# Patient Record
Sex: MALE | Race: WHITE | NOT HISPANIC OR LATINO | Employment: FULL TIME | URBAN - NONMETROPOLITAN AREA
[De-identification: names, ages, dates, MRNs, and addresses within clinical notes are randomized per-mention and may not be internally consistent; named-entity substitution may affect disease eponyms.]

---

## 2021-06-29 ENCOUNTER — APPOINTMENT (EMERGENCY)
Dept: RADIOLOGY | Facility: HOSPITAL | Age: 51
End: 2021-06-29
Payer: COMMERCIAL

## 2021-06-29 ENCOUNTER — HOSPITAL ENCOUNTER (EMERGENCY)
Facility: HOSPITAL | Age: 51
End: 2021-06-30
Attending: EMERGENCY MEDICINE | Admitting: EMERGENCY MEDICINE
Payer: COMMERCIAL

## 2021-06-29 DIAGNOSIS — R79.89 ELEVATED D-DIMER: ICD-10-CM

## 2021-06-29 DIAGNOSIS — R77.8 ELEVATED TROPONIN I LEVEL: ICD-10-CM

## 2021-06-29 DIAGNOSIS — R07.9 CHEST PAIN: Primary | ICD-10-CM

## 2021-06-29 DIAGNOSIS — I21.3 ACUTE ST ELEVATION MYOCARDIAL INFARCTION (STEMI) (HCC): ICD-10-CM

## 2021-06-29 LAB
ALBUMIN SERPL BCP-MCNC: 3.9 G/DL (ref 3.5–5)
ALP SERPL-CCNC: 89 U/L (ref 46–116)
ALT SERPL W P-5'-P-CCNC: 35 U/L (ref 12–78)
ANION GAP SERPL CALCULATED.3IONS-SCNC: 9 MMOL/L (ref 4–13)
APTT PPP: 30 SECONDS (ref 23–37)
AST SERPL W P-5'-P-CCNC: 24 U/L (ref 5–45)
BASOPHILS # BLD AUTO: 0.04 THOUSANDS/ΜL (ref 0–0.1)
BASOPHILS NFR BLD AUTO: 1 % (ref 0–1)
BILIRUB SERPL-MCNC: 0.51 MG/DL (ref 0.2–1)
BUN SERPL-MCNC: 16 MG/DL (ref 5–25)
CALCIUM SERPL-MCNC: 8.4 MG/DL (ref 8.3–10.1)
CHLORIDE SERPL-SCNC: 103 MMOL/L (ref 100–108)
CO2 SERPL-SCNC: 28 MMOL/L (ref 21–32)
CREAT SERPL-MCNC: 1.13 MG/DL (ref 0.6–1.3)
D DIMER PPP FEU-MCNC: 0.52 UG/ML FEU
EOSINOPHIL # BLD AUTO: 0.27 THOUSAND/ΜL (ref 0–0.61)
EOSINOPHIL NFR BLD AUTO: 3 % (ref 0–6)
ERYTHROCYTE [DISTWIDTH] IN BLOOD BY AUTOMATED COUNT: 12.9 % (ref 11.6–15.1)
GFR SERPL CREATININE-BSD FRML MDRD: 76 ML/MIN/1.73SQ M
GLUCOSE SERPL-MCNC: 124 MG/DL (ref 65–140)
HCT VFR BLD AUTO: 48.6 % (ref 36.5–49.3)
HGB BLD-MCNC: 16.6 G/DL (ref 12–17)
IMM GRANULOCYTES # BLD AUTO: 0.03 THOUSAND/UL (ref 0–0.2)
IMM GRANULOCYTES NFR BLD AUTO: 0 % (ref 0–2)
INR PPP: 0.96 (ref 0.84–1.19)
LIPASE SERPL-CCNC: 176 U/L (ref 73–393)
LYMPHOCYTES # BLD AUTO: 2.69 THOUSANDS/ΜL (ref 0.6–4.47)
LYMPHOCYTES NFR BLD AUTO: 32 % (ref 14–44)
MCH RBC QN AUTO: 30.9 PG (ref 26.8–34.3)
MCHC RBC AUTO-ENTMCNC: 34.2 G/DL (ref 31.4–37.4)
MCV RBC AUTO: 90 FL (ref 82–98)
MONOCYTES # BLD AUTO: 0.59 THOUSAND/ΜL (ref 0.17–1.22)
MONOCYTES NFR BLD AUTO: 7 % (ref 4–12)
NEUTROPHILS # BLD AUTO: 4.9 THOUSANDS/ΜL (ref 1.85–7.62)
NEUTS SEG NFR BLD AUTO: 57 % (ref 43–75)
NRBC BLD AUTO-RTO: 0 /100 WBCS
PLATELET # BLD AUTO: 185 THOUSANDS/UL (ref 149–390)
PMV BLD AUTO: 9.6 FL (ref 8.9–12.7)
POTASSIUM SERPL-SCNC: 3.7 MMOL/L (ref 3.5–5.3)
PROT SERPL-MCNC: 7.3 G/DL (ref 6.4–8.2)
PROTHROMBIN TIME: 12.6 SECONDS (ref 11.6–14.5)
RBC # BLD AUTO: 5.38 MILLION/UL (ref 3.88–5.62)
SODIUM SERPL-SCNC: 140 MMOL/L (ref 136–145)
TROPONIN I SERPL-MCNC: 0.13 NG/ML
WBC # BLD AUTO: 8.52 THOUSAND/UL (ref 4.31–10.16)

## 2021-06-29 PROCEDURE — 85379 FIBRIN DEGRADATION QUANT: CPT | Performed by: EMERGENCY MEDICINE

## 2021-06-29 PROCEDURE — 80053 COMPREHEN METABOLIC PANEL: CPT | Performed by: EMERGENCY MEDICINE

## 2021-06-29 PROCEDURE — 84484 ASSAY OF TROPONIN QUANT: CPT | Performed by: EMERGENCY MEDICINE

## 2021-06-29 PROCEDURE — 96365 THER/PROPH/DIAG IV INF INIT: CPT

## 2021-06-29 PROCEDURE — 83036 HEMOGLOBIN GLYCOSYLATED A1C: CPT | Performed by: INTERNAL MEDICINE

## 2021-06-29 PROCEDURE — 85025 COMPLETE CBC W/AUTO DIFF WBC: CPT | Performed by: EMERGENCY MEDICINE

## 2021-06-29 PROCEDURE — 99285 EMERGENCY DEPT VISIT HI MDM: CPT

## 2021-06-29 PROCEDURE — 99291 CRITICAL CARE FIRST HOUR: CPT | Performed by: EMERGENCY MEDICINE

## 2021-06-29 PROCEDURE — 85730 THROMBOPLASTIN TIME PARTIAL: CPT | Performed by: EMERGENCY MEDICINE

## 2021-06-29 PROCEDURE — 36415 COLL VENOUS BLD VENIPUNCTURE: CPT | Performed by: EMERGENCY MEDICINE

## 2021-06-29 PROCEDURE — 71045 X-RAY EXAM CHEST 1 VIEW: CPT

## 2021-06-29 PROCEDURE — 96375 TX/PRO/DX INJ NEW DRUG ADDON: CPT

## 2021-06-29 PROCEDURE — 83690 ASSAY OF LIPASE: CPT | Performed by: EMERGENCY MEDICINE

## 2021-06-29 PROCEDURE — 85610 PROTHROMBIN TIME: CPT | Performed by: EMERGENCY MEDICINE

## 2021-06-29 PROCEDURE — 80061 LIPID PANEL: CPT | Performed by: INTERNAL MEDICINE

## 2021-06-29 PROCEDURE — 93005 ELECTROCARDIOGRAM TRACING: CPT

## 2021-06-29 RX ORDER — ONDANSETRON 2 MG/ML
4 INJECTION INTRAMUSCULAR; INTRAVENOUS ONCE
Status: COMPLETED | OUTPATIENT
Start: 2021-06-30 | End: 2021-06-29

## 2021-06-29 RX ORDER — NITROGLYCERIN 20 MG/100ML
10 INJECTION INTRAVENOUS
Status: DISCONTINUED | OUTPATIENT
Start: 2021-06-29 | End: 2021-06-30 | Stop reason: HOSPADM

## 2021-06-29 RX ORDER — CLOPIDOGREL BISULFATE 75 MG/1
600 TABLET ORAL ONCE
Status: DISCONTINUED | OUTPATIENT
Start: 2021-06-29 | End: 2021-06-29

## 2021-06-29 RX ORDER — HEPARIN SODIUM 1000 [USP'U]/ML
4000 INJECTION, SOLUTION INTRAVENOUS; SUBCUTANEOUS ONCE
Status: COMPLETED | OUTPATIENT
Start: 2021-06-29 | End: 2021-06-29

## 2021-06-29 RX ORDER — ASPIRIN 81 MG/1
324 TABLET, CHEWABLE ORAL ONCE
Status: COMPLETED | OUTPATIENT
Start: 2021-06-29 | End: 2021-06-29

## 2021-06-29 RX ORDER — DEXAMETHASONE SODIUM PHOSPHATE 10 MG/ML
10 INJECTION, SOLUTION INTRAMUSCULAR; INTRAVENOUS ONCE
Status: COMPLETED | OUTPATIENT
Start: 2021-06-29 | End: 2021-06-29

## 2021-06-29 RX ORDER — NITROGLYCERIN 0.4 MG/1
0.4 TABLET SUBLINGUAL ONCE
Status: COMPLETED | OUTPATIENT
Start: 2021-06-29 | End: 2021-06-29

## 2021-06-29 RX ORDER — SODIUM CHLORIDE 9 MG/ML
125 INJECTION, SOLUTION INTRAVENOUS CONTINUOUS
Status: DISCONTINUED | OUTPATIENT
Start: 2021-06-29 | End: 2021-06-30 | Stop reason: HOSPADM

## 2021-06-29 RX ADMIN — ASPIRIN 81 MG CHEWABLE TABLET 324 MG: 81 TABLET CHEWABLE at 23:14

## 2021-06-29 RX ADMIN — TICAGRELOR 180 MG: 90 TABLET ORAL at 23:31

## 2021-06-29 RX ADMIN — NITROGLYCERIN 0.4 MG: 0.4 TABLET SUBLINGUAL at 23:16

## 2021-06-29 RX ADMIN — HEPARIN SODIUM 4000 UNITS: 1000 INJECTION INTRAVENOUS; SUBCUTANEOUS at 23:36

## 2021-06-29 RX ADMIN — SODIUM CHLORIDE 125 ML/HR: 0.9 INJECTION, SOLUTION INTRAVENOUS at 23:24

## 2021-06-29 RX ADMIN — DEXAMETHASONE SODIUM PHOSPHATE 10 MG: 10 INJECTION, SOLUTION INTRAMUSCULAR; INTRAVENOUS at 23:19

## 2021-06-29 RX ADMIN — FAMOTIDINE 20 MG: 10 INJECTION INTRAVENOUS at 23:21

## 2021-06-29 RX ADMIN — ONDANSETRON 4 MG: 2 INJECTION INTRAMUSCULAR; INTRAVENOUS at 23:55

## 2021-06-29 RX ADMIN — NITROGLYCERIN 10 MCG/MIN: 20 INJECTION INTRAVENOUS at 23:39

## 2021-06-30 ENCOUNTER — APPOINTMENT (INPATIENT)
Dept: NON INVASIVE DIAGNOSTICS | Facility: HOSPITAL | Age: 51
DRG: 174 | End: 2021-06-30
Payer: COMMERCIAL

## 2021-06-30 ENCOUNTER — HOSPITAL ENCOUNTER (INPATIENT)
Dept: NON INVASIVE DIAGNOSTICS | Facility: HOSPITAL | Age: 51
LOS: 1 days | Discharge: HOME | DRG: 174 | End: 2021-07-01
Attending: INTERNAL MEDICINE | Admitting: INTERNAL MEDICINE
Payer: COMMERCIAL

## 2021-06-30 VITALS
TEMPERATURE: 97.1 F | RESPIRATION RATE: 18 BRPM | SYSTOLIC BLOOD PRESSURE: 155 MMHG | WEIGHT: 315 LBS | DIASTOLIC BLOOD PRESSURE: 87 MMHG | HEIGHT: 70 IN | BODY MASS INDEX: 45.1 KG/M2 | HEART RATE: 91 BPM | OXYGEN SATURATION: 98 %

## 2021-06-30 DIAGNOSIS — I21.3 ST ELEVATION MYOCARDIAL INFARCTION (STEMI), UNSPECIFIED ARTERY (HCC): ICD-10-CM

## 2021-06-30 DIAGNOSIS — I21.9 TYPE 1 MYOCARDIAL INFARCTION (HCC): ICD-10-CM

## 2021-06-30 DIAGNOSIS — I21.02 ACUTE ST ELEVATION MYOCARDIAL INFARCTION (STEMI) DUE TO OCCLUSION OF MID PORTION OF LEFT ANTERIOR DESCENDING (LAD) CORONARY ARTERY (HCC): Primary | ICD-10-CM

## 2021-06-30 LAB
ANION GAP SERPL CALCULATED.3IONS-SCNC: 7 MMOL/L (ref 4–13)
ATRIAL RATE: 75 BPM
ATRIAL RATE: 78 BPM
ATRIAL RATE: 93 BPM
ATRIAL RATE: 97 BPM
BUN SERPL-MCNC: 15 MG/DL (ref 5–25)
CALCIUM SERPL-MCNC: 8.7 MG/DL (ref 8.3–10.1)
CHLORIDE SERPL-SCNC: 105 MMOL/L (ref 100–108)
CHOLEST SERPL-MCNC: 168 MG/DL (ref 50–200)
CO2 SERPL-SCNC: 22 MMOL/L (ref 21–32)
CREAT SERPL-MCNC: 0.95 MG/DL (ref 0.6–1.3)
ERYTHROCYTE [DISTWIDTH] IN BLOOD BY AUTOMATED COUNT: 12.8 % (ref 11.6–15.1)
EST. AVERAGE GLUCOSE BLD GHB EST-MCNC: 108 MG/DL
GFR SERPL CREATININE-BSD FRML MDRD: 94 ML/MIN/1.73SQ M
GLUCOSE SERPL-MCNC: 158 MG/DL (ref 65–140)
HBA1C MFR BLD: 5.4 %
HCT VFR BLD AUTO: 47.2 % (ref 36.5–49.3)
HDLC SERPL-MCNC: 30 MG/DL
HGB BLD-MCNC: 16.2 G/DL (ref 12–17)
KCT BLD-ACNC: 219 SEC (ref 89–137)
LDLC SERPL CALC-MCNC: 71 MG/DL (ref 0–100)
LDLC SERPL DIRECT ASSAY-MCNC: 124 MG/DL (ref 0–100)
MCH RBC QN AUTO: 30.9 PG (ref 26.8–34.3)
MCHC RBC AUTO-ENTMCNC: 34.3 G/DL (ref 31.4–37.4)
MCV RBC AUTO: 90 FL (ref 82–98)
P AXIS: 62 DEGREES
P AXIS: 62 DEGREES
P AXIS: 65 DEGREES
P AXIS: 67 DEGREES
PLATELET # BLD AUTO: 177 THOUSANDS/UL (ref 149–390)
PMV BLD AUTO: 9.6 FL (ref 8.9–12.7)
POTASSIUM SERPL-SCNC: 4.3 MMOL/L (ref 3.5–5.3)
PR INTERVAL: 150 MS
PR INTERVAL: 158 MS
PR INTERVAL: 158 MS
PR INTERVAL: 167 MS
QRS AXIS: 49 DEGREES
QRS AXIS: 52 DEGREES
QRS AXIS: 60 DEGREES
QRS AXIS: 63 DEGREES
QRSD INTERVAL: 100 MS
QRSD INTERVAL: 88 MS
QRSD INTERVAL: 96 MS
QRSD INTERVAL: 96 MS
QT INTERVAL: 346 MS
QT INTERVAL: 348 MS
QT INTERVAL: 375 MS
QT INTERVAL: 375 MS
QTC INTERVAL: 419 MS
QTC INTERVAL: 428 MS
QTC INTERVAL: 432 MS
QTC INTERVAL: 439 MS
RBC # BLD AUTO: 5.24 MILLION/UL (ref 3.88–5.62)
SODIUM SERPL-SCNC: 134 MMOL/L (ref 136–145)
SPECIMEN SOURCE: ABNORMAL
T WAVE AXIS: 71 DEGREES
T WAVE AXIS: 73 DEGREES
T WAVE AXIS: 74 DEGREES
T WAVE AXIS: 83 DEGREES
TRIGL SERPL-MCNC: 334 MG/DL
TROPONIN I SERPL-MCNC: 1.42 NG/ML
VENTRICULAR RATE: 75 BPM
VENTRICULAR RATE: 78 BPM
VENTRICULAR RATE: 93 BPM
VENTRICULAR RATE: 97 BPM
WBC # BLD AUTO: 7.2 THOUSAND/UL (ref 4.31–10.16)

## 2021-06-30 PROCEDURE — 83721 ASSAY OF BLOOD LIPOPROTEIN: CPT | Performed by: STUDENT IN AN ORGANIZED HEALTH CARE EDUCATION/TRAINING PROGRAM

## 2021-06-30 PROCEDURE — 99153 MOD SED SAME PHYS/QHP EA: CPT | Performed by: INTERNAL MEDICINE

## 2021-06-30 PROCEDURE — C1894 INTRO/SHEATH, NON-LASER: HCPCS | Performed by: INTERNAL MEDICINE

## 2021-06-30 PROCEDURE — 99152 MOD SED SAME PHYS/QHP 5/>YRS: CPT | Performed by: INTERNAL MEDICINE

## 2021-06-30 PROCEDURE — C1769 GUIDE WIRE: HCPCS | Performed by: INTERNAL MEDICINE

## 2021-06-30 PROCEDURE — 93005 ELECTROCARDIOGRAM TRACING: CPT

## 2021-06-30 PROCEDURE — C9606 PERC D-E COR REVASC W AMI S: HCPCS | Performed by: INTERNAL MEDICINE

## 2021-06-30 PROCEDURE — 027034Z DILATION OF CORONARY ARTERY, ONE ARTERY WITH DRUG-ELUTING INTRALUMINAL DEVICE, PERCUTANEOUS APPROACH: ICD-10-PCS | Performed by: INTERNAL MEDICINE

## 2021-06-30 PROCEDURE — 84484 ASSAY OF TROPONIN QUANT: CPT | Performed by: INTERNAL MEDICINE

## 2021-06-30 PROCEDURE — C1874 STENT, COATED/COV W/DEL SYS: HCPCS

## 2021-06-30 PROCEDURE — C1725 CATH, TRANSLUMIN NON-LASER: HCPCS | Performed by: INTERNAL MEDICINE

## 2021-06-30 PROCEDURE — 85347 COAGULATION TIME ACTIVATED: CPT

## 2021-06-30 PROCEDURE — 93010 ELECTROCARDIOGRAM REPORT: CPT | Performed by: INTERNAL MEDICINE

## 2021-06-30 PROCEDURE — B2111ZZ FLUOROSCOPY OF MULTIPLE CORONARY ARTERIES USING LOW OSMOLAR CONTRAST: ICD-10-PCS | Performed by: INTERNAL MEDICINE

## 2021-06-30 PROCEDURE — 92941 PRQ TRLML REVSC TOT OCCL AMI: CPT | Performed by: INTERNAL MEDICINE

## 2021-06-30 PROCEDURE — NC001 PR NO CHARGE: Performed by: INTERNAL MEDICINE

## 2021-06-30 PROCEDURE — C1887 CATHETER, GUIDING: HCPCS | Performed by: INTERNAL MEDICINE

## 2021-06-30 PROCEDURE — 85027 COMPLETE CBC AUTOMATED: CPT | Performed by: INTERNAL MEDICINE

## 2021-06-30 PROCEDURE — 4A023N7 MEASUREMENT OF CARDIAC SAMPLING AND PRESSURE, LEFT HEART, PERCUTANEOUS APPROACH: ICD-10-PCS | Performed by: INTERNAL MEDICINE

## 2021-06-30 PROCEDURE — 93306 TTE W/DOPPLER COMPLETE: CPT | Performed by: INTERNAL MEDICINE

## 2021-06-30 PROCEDURE — 93458 L HRT ARTERY/VENTRICLE ANGIO: CPT | Performed by: INTERNAL MEDICINE

## 2021-06-30 PROCEDURE — 93306 TTE W/DOPPLER COMPLETE: CPT

## 2021-06-30 PROCEDURE — 80048 BASIC METABOLIC PNL TOTAL CA: CPT | Performed by: INTERNAL MEDICINE

## 2021-06-30 RX ORDER — METOPROLOL TARTRATE 5 MG/5ML
INJECTION INTRAVENOUS CODE/TRAUMA/SEDATION MEDICATION
Status: COMPLETED | OUTPATIENT
Start: 2021-06-30 | End: 2021-06-30

## 2021-06-30 RX ORDER — LISINOPRIL 5 MG/1
5 TABLET ORAL DAILY
Status: DISCONTINUED | OUTPATIENT
Start: 2021-06-30 | End: 2021-07-01 | Stop reason: HOSPADM

## 2021-06-30 RX ORDER — ACETAMINOPHEN 325 MG/1
650 TABLET ORAL EVERY 8 HOURS PRN
Status: DISCONTINUED | OUTPATIENT
Start: 2021-06-30 | End: 2021-07-01 | Stop reason: HOSPADM

## 2021-06-30 RX ORDER — CLOPIDOGREL BISULFATE 75 MG/1
75 TABLET ORAL DAILY
Status: DISCONTINUED | OUTPATIENT
Start: 2021-07-01 | End: 2021-07-01 | Stop reason: HOSPADM

## 2021-06-30 RX ORDER — MIDAZOLAM HYDROCHLORIDE 2 MG/2ML
INJECTION, SOLUTION INTRAMUSCULAR; INTRAVENOUS CODE/TRAUMA/SEDATION MEDICATION
Status: COMPLETED | OUTPATIENT
Start: 2021-06-30 | End: 2021-06-30

## 2021-06-30 RX ORDER — SODIUM CHLORIDE 9 MG/ML
INJECTION, SOLUTION INTRAVENOUS
Status: COMPLETED | OUTPATIENT
Start: 2021-06-30 | End: 2021-06-30

## 2021-06-30 RX ORDER — NITROGLYCERIN 20 MG/100ML
INJECTION INTRAVENOUS CODE/TRAUMA/SEDATION MEDICATION
Status: COMPLETED | OUTPATIENT
Start: 2021-06-30 | End: 2021-06-30

## 2021-06-30 RX ORDER — LIDOCAINE HYDROCHLORIDE 10 MG/ML
INJECTION, SOLUTION EPIDURAL; INFILTRATION; INTRACAUDAL; PERINEURAL CODE/TRAUMA/SEDATION MEDICATION
Status: COMPLETED | OUTPATIENT
Start: 2021-06-30 | End: 2021-06-30

## 2021-06-30 RX ORDER — SODIUM CHLORIDE, SODIUM GLUCONATE, SODIUM ACETATE, POTASSIUM CHLORIDE, MAGNESIUM CHLORIDE, SODIUM PHOSPHATE, DIBASIC, AND POTASSIUM PHOSPHATE .53; .5; .37; .037; .03; .012; .00082 G/100ML; G/100ML; G/100ML; G/100ML; G/100ML; G/100ML; G/100ML
125 INJECTION, SOLUTION INTRAVENOUS CONTINUOUS
Status: DISPENSED | OUTPATIENT
Start: 2021-06-30 | End: 2021-06-30

## 2021-06-30 RX ORDER — OXYCODONE HYDROCHLORIDE 5 MG/1
5 TABLET ORAL EVERY 6 HOURS PRN
Status: ACTIVE | OUTPATIENT
Start: 2021-06-30 | End: 2021-06-30

## 2021-06-30 RX ORDER — HEPARIN SODIUM 1000 [USP'U]/ML
INJECTION, SOLUTION INTRAVENOUS; SUBCUTANEOUS CODE/TRAUMA/SEDATION MEDICATION
Status: COMPLETED | OUTPATIENT
Start: 2021-06-30 | End: 2021-06-30

## 2021-06-30 RX ORDER — NICOTINE 21 MG/24HR
21 PATCH, TRANSDERMAL 24 HOURS TRANSDERMAL DAILY
Status: CANCELLED | OUTPATIENT
Start: 2021-07-01

## 2021-06-30 RX ORDER — ATORVASTATIN CALCIUM 40 MG/1
40 TABLET, FILM COATED ORAL
Status: DISCONTINUED | OUTPATIENT
Start: 2021-06-30 | End: 2021-07-01 | Stop reason: HOSPADM

## 2021-06-30 RX ORDER — VERAPAMIL HYDROCHLORIDE 2.5 MG/ML
INJECTION, SOLUTION INTRAVENOUS CODE/TRAUMA/SEDATION MEDICATION
Status: COMPLETED | OUTPATIENT
Start: 2021-06-30 | End: 2021-06-30

## 2021-06-30 RX ORDER — POTASSIUM CHLORIDE 20 MEQ/1
40 TABLET, EXTENDED RELEASE ORAL DAILY
Status: DISCONTINUED | OUTPATIENT
Start: 2021-06-30 | End: 2021-07-01 | Stop reason: HOSPADM

## 2021-06-30 RX ORDER — FENTANYL CITRATE 50 UG/ML
INJECTION, SOLUTION INTRAMUSCULAR; INTRAVENOUS CODE/TRAUMA/SEDATION MEDICATION
Status: COMPLETED | OUTPATIENT
Start: 2021-06-30 | End: 2021-06-30

## 2021-06-30 RX ORDER — ASPIRIN 81 MG/1
81 TABLET ORAL DAILY
Status: DISCONTINUED | OUTPATIENT
Start: 2021-06-30 | End: 2021-07-01 | Stop reason: HOSPADM

## 2021-06-30 RX ORDER — NICOTINE 21 MG/24HR
21 PATCH, TRANSDERMAL 24 HOURS TRANSDERMAL DAILY
Status: DISCONTINUED | OUTPATIENT
Start: 2021-06-30 | End: 2021-07-01 | Stop reason: HOSPADM

## 2021-06-30 RX ORDER — CLOPIDOGREL BISULFATE 75 MG/1
300 TABLET ORAL ONCE
Status: COMPLETED | OUTPATIENT
Start: 2021-06-30 | End: 2021-06-30

## 2021-06-30 RX ORDER — POLYETHYLENE GLYCOL 3350 17 G/17G
17 POWDER, FOR SOLUTION ORAL DAILY PRN
Status: DISCONTINUED | OUTPATIENT
Start: 2021-06-30 | End: 2021-07-01 | Stop reason: HOSPADM

## 2021-06-30 RX ORDER — NICOTINE 21 MG/24HR
14 PATCH, TRANSDERMAL 24 HOURS TRANSDERMAL DAILY
Status: DISCONTINUED | OUTPATIENT
Start: 2021-06-30 | End: 2021-06-30

## 2021-06-30 RX ADMIN — METOPROLOL TARTRATE 25 MG: 50 TABLET, FILM COATED ORAL at 02:29

## 2021-06-30 RX ADMIN — Medication 14 MG: at 03:14

## 2021-06-30 RX ADMIN — LIDOCAINE HYDROCHLORIDE 1 ML: 10 INJECTION, SOLUTION EPIDURAL; INFILTRATION; INTRACAUDAL; PERINEURAL at 00:43

## 2021-06-30 RX ADMIN — FENTANYL CITRATE 50 MCG: 50 INJECTION INTRAMUSCULAR; INTRAVENOUS at 00:37

## 2021-06-30 RX ADMIN — MIDAZOLAM 2 MG: 1 INJECTION INTRAMUSCULAR; INTRAVENOUS at 01:11

## 2021-06-30 RX ADMIN — LISINOPRIL 5 MG: 5 TABLET ORAL at 12:13

## 2021-06-30 RX ADMIN — MIDAZOLAM 2 MG: 1 INJECTION INTRAMUSCULAR; INTRAVENOUS at 00:37

## 2021-06-30 RX ADMIN — METOPROLOL TARTRATE 25 MG: 50 TABLET, FILM COATED ORAL at 09:23

## 2021-06-30 RX ADMIN — CLOPIDOGREL BISULFATE 300 MG: 75 TABLET ORAL at 18:18

## 2021-06-30 RX ADMIN — VERAPAMIL HYDROCHLORIDE 2.5 MG: 2.5 INJECTION, SOLUTION INTRAVENOUS at 00:45

## 2021-06-30 RX ADMIN — POTASSIUM CHLORIDE 40 MEQ: 1500 TABLET, EXTENDED RELEASE ORAL at 09:24

## 2021-06-30 RX ADMIN — ENOXAPARIN SODIUM 40 MG: 40 INJECTION SUBCUTANEOUS at 09:30

## 2021-06-30 RX ADMIN — TICAGRELOR 90 MG: 90 TABLET ORAL at 09:24

## 2021-06-30 RX ADMIN — FENTANYL CITRATE 25 MCG: 50 INJECTION INTRAMUSCULAR; INTRAVENOUS at 01:11

## 2021-06-30 RX ADMIN — ATORVASTATIN CALCIUM 40 MG: 40 TABLET, FILM COATED ORAL at 16:02

## 2021-06-30 RX ADMIN — Medication 200 MCG: at 00:44

## 2021-06-30 RX ADMIN — SODIUM CHLORIDE 100 ML/HR: 0.9 INJECTION, SOLUTION INTRAVENOUS at 00:36

## 2021-06-30 RX ADMIN — Medication 200 MCG: at 01:13

## 2021-06-30 RX ADMIN — Medication 200 MCG: at 01:20

## 2021-06-30 RX ADMIN — SODIUM CHLORIDE, SODIUM GLUCONATE, SODIUM ACETATE, POTASSIUM CHLORIDE, MAGNESIUM CHLORIDE, SODIUM PHOSPHATE, DIBASIC, AND POTASSIUM PHOSPHATE 125 ML/HR: .53; .5; .37; .037; .03; .012; .00082 INJECTION, SOLUTION INTRAVENOUS at 02:05

## 2021-06-30 RX ADMIN — IOHEXOL 50 ML: 350 INJECTION, SOLUTION INTRAVENOUS at 01:24

## 2021-06-30 RX ADMIN — METOROPROLOL TARTRATE 5 MG: 5 INJECTION, SOLUTION INTRAVENOUS at 01:19

## 2021-06-30 RX ADMIN — NICOTINE 21 MG: 21 PATCH, EXTENDED RELEASE TRANSDERMAL at 16:02

## 2021-06-30 RX ADMIN — Medication 14 MG: at 09:20

## 2021-06-30 RX ADMIN — HEPARIN SODIUM 8000 UNITS: 1000 INJECTION INTRAVENOUS; SUBCUTANEOUS at 01:02

## 2021-06-30 RX ADMIN — HEPARIN SODIUM 4000 UNITS: 1000 INJECTION INTRAVENOUS; SUBCUTANEOUS at 00:46

## 2021-06-30 RX ADMIN — ASPIRIN 81 MG: 81 TABLET, COATED ORAL at 09:24

## 2021-06-30 RX ADMIN — IOHEXOL 130 ML: 350 INJECTION, SOLUTION INTRAVENOUS at 01:10

## 2021-06-30 RX ADMIN — METOPROLOL TARTRATE 25 MG: 50 TABLET, FILM COATED ORAL at 21:45

## 2021-06-30 NOTE — H&P
Cardiology H&P  Ramy Joe 52 y o  male MRN: [id number]  Unit/Bed#:  Encounter: [id number]    HPI  50yo man without known cardiac history who initially presented to [facility] Valery Sauer who was transferred to [facility] for emergent LHC  Initially presented with intermittent chest pain radiating to his throat and jaw  Also associated pain in both arms but worse in left  Active e-cig use  Troponin positive for 0 13  Initial ECG reported via ED documentation for ST-elevations in V3, V4 with reciprocal ST-depressions in leads I, aVL but unable to locate this ECG  In cath lab showing MARIA LUISA inferior leads  ECG en route via EMS appears normal  LHC via radial approach showed lesion at the ostium of LAD now s/p ALVARADO  A&P  1  STEMI s/p ALVARADO to LAD  - K 3 7  Giving 40mEq PO  - Aspirin 81mg, Brilinta 90mg BID, atorvastatin 40mg  Starting metop tartrate 25mg BID  - TTE, A1C, lipid panel  - Isolyte at 125mL/hr for 1L total  Then can stop IVF  - ECG on arrival to floor and in the morning to evaluate evolution of ST-segment changes  - Troponin in the morning  - Case management consulted and rx for Brilinta sent to [address] [place] [address] for hagan check    2  Nicotine use: can use patch if needed  Complete cessation advised     3  VTE ppx: SCDs, Lovenox 40mg daily in the morning    Romayne El, MD  - PGY-6 Cardiology Fellow  - Tiger text enabled    ======================================================    Physical exam  Vitals: /97, HR 91 bpm, SP02 95% ORA, 97 1 F, RR 18  Gen: well appearing  Psych: AOx3  Skin: intact  Cardiac: S1, S2, regular rate, no S3 or S4 appreciated  No murmurs  +2 PT, radial pulses  No peripheral edema No carotid bruits  Resp: CTABL  No crackles  MSK: 5/5 strength throughout muscle groups  Neuro: CN grossly intact   Sensory to light touch, pain, proprioception intact BL LE, UE  LN: no cervical LAD  Rheum: no joint deformities in UE or LE    ======================================================    TREADMILL STRESS  No results found for this or any previous visit      ----------------------------------------------------------------------------------------------  NUCLEAR STRESS TEST: No results found for this or any previous visit  No results found for this or any previous visit       --------------------------------------------------------------------------------  CATH:  No results found for this or any previous visit     --------------------------------------------------------------------------------  ECHO:   No results found for this or any previous visit  No results found for this or any previous visit     --------------------------------------------------------------------------------  HOLTER  No results found for this or any previous visit     --------------------------------------------------------------------------------  CAROTIDS  No results found for this or any previous visit  [unfilled]   ======================================================      Review of Systems  ROS as noted above, otherwise 12 point review of systems was performed and is negative  Historical Information   No past medical history on file  No past surgical history on file  Social History     Substance and Sexual Activity   Alcohol Use Not Currently     Social History     Substance and Sexual Activity   Drug Use Not Currently     Social History     Tobacco Use   Smoking Status Current Every Day Smoker    Packs/day: 2 00    Years: 22 00    Pack years: 44 00   Smokeless Tobacco Never Used     Family History: No family history on file      Meds/Allergies   Hospital Medications:   Current Facility-Administered Medications   Medication Dose Route Frequency    fentanyl citrate (PF) 100 MCG/2ML   Intravenous Code/Trauma/Sedation Med    midazolam (VERSED) injection    Code/Trauma/Sedation Med    sodium chloride 0 9 % infusion    Code/Trauma/Sedation Continuous Med     Home Medications: (Not in a hospital admission)      No Known Allergies    Objective   Vitals: There were no vitals taken for this visit  No intake or output data in the 24 hours ending 06/30/21 0038    Invasive Devices     Peripheral Intravenous Line            Peripheral IV 06/29/21 Left Antecubital <1 day                Physical Exam    Lab Results: I have personally reviewed pertinent lab results      Results from last 7 days   Lab Units 06/29/21  2305   WBC Thousand/uL 8 52   HEMOGLOBIN g/dL 16 6   HEMATOCRIT % 48 6   PLATELETS Thousands/uL 185     Results from last 7 days   Lab Units 06/29/21  2305   POTASSIUM mmol/L 3 7   CHLORIDE mmol/L 103   CO2 mmol/L 28   BUN mg/dL 16   CREATININE mg/dL 1 13   CALCIUM mg/dL 8 4     Results from last 7 days   Lab Units 06/29/21  2305   INR  0 96   PTT seconds 30

## 2021-06-30 NOTE — ED PROVIDER NOTES
History  Chief Complaint   Patient presents with    Chest Pain     Started a few weeks ago across the entire chest, radiates into both arms, denies any cardiac hx     53 yo male presents with intermitant ant chest pain which radiates to throat and jaw  He can feel it in his bilateral arms left greater than right  Denies shortness of breath increasing dyspnea on exertion or pleuritic pain no prior history of DVT or PE  He denies any numbness or tingling  Works outside he was not sure if he was cut contributing factor he cut out drinking monster drinks last week but he still having the symptoms  The patient does smoke and vape but otherwise past medical history is unremarkable past surgical history none allergies no known drug allergies medications patient did has been taking some aspirin intermittently  Patient denies any voice change but he feels as if his glands are swollen in his throat he has otherwise had no rash; no itching no difficulty swallowing no heart burn  None       History reviewed  No pertinent past medical history  History reviewed  No pertinent surgical history  History reviewed  No pertinent family history  I have reviewed and agree with the history as documented  E-Cigarette/Vaping     E-Cigarette/Vaping Substances     Social History     Tobacco Use    Smoking status: Current Every Day Smoker     Packs/day: 2 00     Years: 22 00     Pack years: 44 00    Smokeless tobacco: Never Used   Substance Use Topics    Alcohol use: Not Currently    Drug use: Not Currently       Review of Systems   Constitutional: Positive for activity change  Negative for appetite change, chills, diaphoresis and fatigue  HENT: Positive for trouble swallowing  Negative for congestion, sore throat and voice change  Respiratory: Negative for chest tightness and shortness of breath  Cardiovascular: Positive for chest pain  Negative for leg swelling     Gastrointestinal: Negative for abdominal distention, abdominal pain, nausea and vomiting  Genitourinary: Negative for difficulty urinating  Musculoskeletal: Positive for myalgias (bilateral arm pain left greater than right)  Negative for neck pain and neck stiffness  Neurological: Negative for dizziness, weakness, light-headedness and numbness  Psychiatric/Behavioral: Negative for confusion  All other systems reviewed and are negative  Physical Exam  Physical Exam  Vitals and nursing note reviewed  Constitutional:       General: He is not in acute distress  Appearance: He is not ill-appearing, toxic-appearing or diaphoretic  HENT:      Head: Normocephalic and atraumatic  Nose: Nose normal       Mouth/Throat:      Mouth: Mucous membranes are moist       Comments: No evidence of angioedema; uvula is midline  Eyes:      General:         Right eye: No discharge  Left eye: No discharge  Extraocular Movements: Extraocular movements intact  Conjunctiva/sclera: Conjunctivae normal       Pupils: Pupils are equal, round, and reactive to light  Cardiovascular:      Rate and Rhythm: Normal rate and regular rhythm  Pulses: Normal pulses  Pulmonary:      Effort: Pulmonary effort is normal  No respiratory distress  Breath sounds: No stridor  No wheezing or rales  Abdominal:      General: Bowel sounds are normal  There is no distension  Tenderness: There is no abdominal tenderness  There is no right CVA tenderness or left CVA tenderness  Comments: No midline or CVA tendnerness   Musculoskeletal:         General: Normal range of motion  Cervical back: Normal range of motion and neck supple  Right lower leg: No edema  Left lower leg: No edema  Skin:     General: Skin is warm and dry  Capillary Refill: Capillary refill takes less than 2 seconds  Neurological:      General: No focal deficit present  Mental Status: He is alert  Cranial Nerves: No cranial nerve deficit  Sensory: No sensory deficit  Motor: No weakness        Coordination: Coordination normal    Psychiatric:      Comments: anxious         Vital Signs  ED Triage Vitals   Temperature Pulse Respirations Blood Pressure SpO2   06/29/21 2229 06/29/21 2229 06/29/21 2229 06/29/21 2234 06/29/21 2234   (!) 97 1 °F (36 2 °C) 93 18 144/97 98 %      Temp Source Heart Rate Source Patient Position - Orthostatic VS BP Location FiO2 (%)   06/29/21 2229 06/29/21 2245 06/29/21 2245 06/29/21 2245 --   Temporal Monitor Lying Right arm       Pain Score       06/29/21 2330       Worst Possible Pain           Vitals:    06/29/21 2340 06/29/21 2345 06/29/21 2349 06/29/21 2350   BP: 140/81 155/87 155/87    Pulse: 99 93 90 91   Patient Position - Orthostatic VS: Sitting  Sitting          Visual Acuity      ED Medications  Medications   aspirin chewable tablet 324 mg (324 mg Oral Given 6/29/21 2314)   nitroglycerin (NITROSTAT) SL tablet 0 4 mg (0 4 mg Sublingual Given 6/29/21 2316)   dexamethasone (PF) (DECADRON) injection 10 mg (10 mg Intravenous Given 6/29/21 2319)   famotidine (PEPCID) injection 20 mg (20 mg Intravenous Given 6/29/21 2321)   heparin (porcine) injection 4,000 Units (4,000 Units Intravenous Given 6/29/21 2336)   ticagrelor (BRILINTA) tablet 180 mg (180 mg Oral Given 6/29/21 2331)   ondansetron (ZOFRAN) injection 4 mg (4 mg Intravenous Given 6/29/21 2355)       Diagnostic Studies  Results Reviewed     Procedure Component Value Units Date/Time    Troponin I [[id number]]  (Abnormal) Collected: 06/29/21 2305    Lab Status: Final result Specimen: Blood from Arm, Left Updated: 06/29/21 2330     Troponin I 0 13 ng/mL     Comprehensive metabolic panel [[id number]] Collected: 06/29/21 2305    Lab Status: Final result Specimen: Blood from Arm, Left Updated: 06/29/21 2327     Sodium 140 mmol/L      Potassium 3 7 mmol/L      Chloride 103 mmol/L      CO2 28 mmol/L      ANION GAP 9 mmol/L      BUN 16 mg/dL      Creatinine 1 13 mg/dL Glucose 124 mg/dL      Calcium 8 4 mg/dL      AST 24 U/L      ALT 35 U/L      Alkaline Phosphatase 89 U/L      Total Protein 7 3 g/dL      Albumin 3 9 g/dL      Total Bilirubin 0 51 mg/dL      eGFR 76 ml/min/1 73sq m     Narrative:      [place] guidelines for Chronic Kidney Disease (CKD):     Stage 1 with normal or high GFR (GFR > 90 mL/min/1 73 square meters)    Stage 2 Mild CKD (GFR = 60-89 mL/min/1 73 square meters)    Stage 3A Moderate CKD (GFR = 45-59 mL/min/1 73 square meters)    Stage 3B Moderate CKD (GFR = 30-44 mL/min/1 73 square meters)    Stage 4 Severe CKD (GFR = 15-29 mL/min/1 73 square meters)    Stage 5 End Stage CKD (GFR <15 mL/min/1 73 square meters)  Note: GFR calculation is accurate only with a steady state creatinine    D-Dimer [[id number]]  (Abnormal) Collected: 06/29/21 2305    Lab Status: Final result Specimen: Blood from Arm, Left Updated: 06/29/21 2327     D-Dimer, Quant 0 52 ug/ml FEU     Protime-INR [[id number]]  (Normal) Collected: 06/29/21 2305    Lab Status: Final result Specimen: Blood from Arm, Left Updated: 06/29/21 2326     Protime 12 6 seconds      INR 0 96    APTT [[id number]]  (Normal) Collected: 06/29/21 2305    Lab Status: Final result Specimen: Blood from Arm, Left Updated: 06/29/21 2326     PTT 30 seconds     Lipase [[id number]]  (Normal) Collected: 06/29/21 2305    Lab Status: Final result Specimen: Blood from Arm, Left Updated: 06/29/21 2322     Lipase 176 u/L     CBC and differential [[id number]] Collected: 06/29/21 2305    Lab Status: Final result Specimen: Blood from Arm, Left Updated: 06/29/21 2313     WBC 8 52 Thousand/uL      RBC 5 38 Million/uL      Hemoglobin 16 6 g/dL      Hematocrit 48 6 %      MCV 90 fL      MCH 30 9 pg      MCHC 34 2 g/dL      RDW 12 9 %      MPV 9 6 fL      Platelets 831 Thousands/uL      nRBC 0 /100 WBCs      Neutrophils Relative 57 %      Immat GRANS % 0 %      Lymphocytes Relative 32 %      Monocytes Relative 7 % Eosinophils Relative 3 %      Basophils Relative 1 %      Neutrophils Absolute 4 90 Thousands/µL      Immature Grans Absolute 0 03 Thousand/uL      Lymphocytes Absolute 2 69 Thousands/µL      Monocytes Absolute 0 59 Thousand/µL      Eosinophils Absolute 0 27 Thousand/µL      Basophils Absolute 0 04 Thousands/µL                  XR chest 1 view portable   ED Interpretation by Db Llamas MD (06/29 2303)   Read by me; Radiologist to provide formal interpretation   No acute process                 Procedures  ECG 12 Lead Documentation Only    Date/Time: 6/29/2021 10:55 PM  Performed by: Db Llamas MD  Authorized by: Db Llamas MD     Indications / Diagnosis:  Cp  ECG reviewed by me, the ED Provider: yes    Patient location:  ED  Previous ECG:     Previous ECG:  Unavailable  Rate:     ECG rate:  93    ECG rate assessment: normal    Rhythm:     Rhythm: sinus rhythm    QRS:     QRS axis:  Normal  Comments:      Nonspecific twave change no acute ischemia no prevoius  ECG 12 Lead Documentation Only    Date/Time: 6/29/2021 11:18 PM  Performed by: Db Llamas MD  Authorized by: Db Llamas MD     Indications / Diagnosis:  Recheck cp  ECG reviewed by me, the ED Provider: yes    Patient location:  ED  Previous ECG:     Previous ECG:  Compared to current    Comparison ECG info:  6/29/21 2228    Similarity:  Changes noted  Rate:     ECG rate:  97    ECG rate assessment: normal    Rhythm:     Rhythm: sinus rhythm    QRS:     QRS axis:  Normal  Comments:      MARIA LUISA V3 V4 with reciprocal STD 1, AVL   CriticalCare Time  Performed by: Db Llamas MD  Authorized by: Db Llamas MD     Critical care provider statement:     Critical care time (minutes):  35    Critical care was necessary to treat or prevent imminent or life-threatening deterioration of the following conditions:  Circulatory failure    Critical care was time spent personally by me on the following activities:  Obtaining history from patient or surrogate, discussions with consultants, evaluation of patient's response to treatment, examination of patient, ordering and review of laboratory studies, ordering and review of radiographic studies, re-evaluation of patient's condition and review of old charts    I assumed direction of critical care for this patient from another provider in my specialty: no               ED Course  ED Course as of Jun 30 0248   Tue Jun 29, 2021   2329 STEMI alert 2318 conferenced with PACs Dr Ziyad Valenzuela after TC EKG#2 accepts patient in transfer to [facility]                HEART Risk Score      Most Recent Value   Heart Score Risk Calculator   History  2 Filed at: 06/29/2021 2332   ECG  2 Filed at: 06/29/2021 2332   Age  1 Filed at: 06/29/2021 2332   Risk Factors  1 Filed at: 06/29/2021 2332   Troponin  2 Filed at: 06/29/2021 2332   HEART Score  8 Filed at: 06/29/2021 2332                                    MDM  Number of Diagnoses or Management Options  Chest pain  Diagnosis management comments: Mdm:  Intermittent chest pain with unremarkable initial EKG  While here patient had acute worsening of symptoms with worsening anterior chest pain radiating to the throat and jaw as well as the arms repeat EKG was obtained at 2318 which is demonstrating a STEMI pattern acute MI alert was called        Disposition  Final diagnoses:   Chest pain   Acute ST elevation myocardial infarction (STEMI) ([facility] )   Elevated troponin I level   Elevated d-dimer     Time reflects when diagnosis was documented in both MDM as applicable and the Disposition within this note     Time User Action Codes Description Comment    6/29/2021 11:32 PM [address] [place] [R07 9] Chest pain     6/30/2021  2:46 AM Genaro Ludwig Add [I21 3] Acute ST elevation myocardial infarction (STEMI) ([facility] )     6/30/2021  2:46 AM Genaro Ludwig Add [R77 8] Elevated troponin I level     6/30/2021  2:47 AM Velvet Sanchez Add [R79 89] Elevated d-dimer ED Disposition     ED Disposition Condition Date/Time Comment    Transfer to Another [address] Jun 29, 2021 11:32 PM Ruddy Nettles should be transferred out to [facility] Dr Aimee Etienne for cath lab intervention        MD Documentation      Most Recent Value   Patient Condition  The patient has been stabilized such that within reasonable medical probability, no material deterioration of the patient condition or the condition of the unborn child(lisa) is likely to result from the transfer   Reason for Transfer  Level of Care needed not available at this facility   Benefits of Transfer  Specialized equipment and/or services available at the receiving facility (Include comment)________________________   Risks of Transfer  Potential for delay in receiving treatment   Accepting Physician    [address], [address]   B    (Name & Tel number)  pacs   Sending MD MCCALL  [facility]   Provider Certification  General risk, such as traffic hazards, adverse weather conditions, rough terrain or turbulence, possible failure of equipment (including vehicle or aircraft), or consequences of actions of persons outside the control of the transport personnel      RN Documentation      [address] Name, [place]  [address] Assignment  CATH LAB    (Name & Tel number)  pacs   Report Given to  AWAITING RETURN CALL      Follow-up Information    None         There are no discharge medications for this patient  No discharge procedures on file      PDMP Review     None          ED Provider  Electronically Signed by           Lucia Villagran MD  06/30/21 1625

## 2021-06-30 NOTE — ED NOTES
pT LEFT 1203 [facility] CATH LAB  STABLE AT TIME OF DEPARTURE        Swetha Palomares RN  06/30/21 2640

## 2021-06-30 NOTE — CONSULTS
Cardiology Team 2 Progress Note - Pedro Parks 52 y o  male MRN: [id number]    Unit/Bed#: [facility] 513-01 Encounter: [id number]          Subjective:   Patient seen and examined  No significant telemetry events overnight  He denies any recurrence of his presenting symptoms  He has no lightheadedness, dizziness, presyncope, diaphoresis, nausea, vomiting, orthopnea, proximal nocturnal dyspnea or lower extremity edema  He feels significantly improved  His symptoms have been ongoing for weeks but he delayed presentation to the hospital until last night  Hospital Course:   Pedro Parks is a 52y o  year old male, employed as a traffic control agent, with a history of active tobacco use disorder and familial early onset heart disease presented to [facility] as a transfer from [facility] with worsening retrosternal chest discomfort, radiating to his throat, jaw and armpits bilaterally  His symptoms were ongoing for weeks to the point that lifting a road cone would cause discomfort  Emergent cardiac catheterization revealed a critical mid LAD culprit lesion at the D1 level we was treated with a 3 0 x 23 mm Xience ALVARADO, jailing the D1 branch with ELOISE 3 flow; residual nonobstructive LCX and prox RCA disease  Patient was admitted to [facility] step-down for further monitoring  Vitals: Blood pressure 147/80, pulse 80, temperature 97 8 °F (36 6 °C), temperature source Oral, resp  rate 20, height 5' 10" (1 778 m), weight (!) 136 kg (300 lb 0 7 oz), SpO2 94 %  , Body mass index is 43 05 kg/m² ,   Orthostatic Blood Pressures      Most Recent Value   Blood Pressure  147/80 filed at 06/30/2021 1100   Patient Position - Orthostatic VS  Lying filed at 06/30/2021 1100            Intake/Output Summary (Last 24 hours) at 6/30/2021 1205  Last data filed at 6/30/2021 1000  Gross per 24 hour   Intake 927 08 ml   Output 1350 ml   Net -422 92 ml         Physical Exam:    GEN: Pedro Parks appears well, alert and oriented x 3, pleasant and cooperative   HEENT:  Normocephalic, atraumatic, anicteric, moist mucous membranes  NECK: No JVD or carotid bruits   HEART:  Regular rhythm, normal rate, normal S1 and S2, no murmurs, clicks, gallops or rubs   LUNGS: Clear to auscultation bilaterally; no wheezes, rales, or rhonchi; respiration nonlabored on room air  ABDOMEN:  Normoactive bowel sounds, soft, no tenderness; protuberant  EXTREMITIES: peripheral pulses palpable; no edema  NEURO: no gross focal findings; cranial nerves grossly intact   SKIN:  Dry, intact, warm to touch    ACCESS:  Right Radial artery has  + 2 pulse with brisk capillary refill  Neurovascular intact to  Right hand  Puncture site with Band-Aid and mild non-tender ecchymosis approximately          Current Facility-Administered Medications:     acetaminophen (TYLENOL) tablet 650 mg, 650 mg, Oral, Q8H PRN, Kenneth Wei MD    aspirin (ECOTRIN LOW STRENGTH) EC tablet 81 mg, 81 mg, Oral, Daily, Kenneth Wei MD, 81 mg at 06/30/21 5036    atorvastatin (LIPITOR) tablet 40 mg, 40 mg, Oral, Daily With Eusebio Massey MD    enoxaparin (LOVENOX) subcutaneous injection 40 mg, 40 mg, Subcutaneous, Daily, Kenneth Wei MD, 40 mg at 06/30/21 0930    lisinopril (ZESTRIL) tablet 5 mg, 5 mg, Oral, Daily, Mane Mendez MD    metoprolol tartrate (LOPRESSOR) tablet 25 mg, 25 mg, Oral, Q12H [facility], Kenneth Wei MD, 25 mg at 06/30/21 0923    nicotine (NICODERM CQ) 14 mg/24hr TD 24 hr patch 14 mg, 14 mg, Transdermal, Daily, Kenneth Wei MD, 14 mg at 06/30/21 0920    oxyCODONE (ROXICODONE) IR tablet 5 mg, 5 mg, Oral, Q6H PRN, Kenneth Wei MD    polyethylene glycol [facility]) packet 17 g, 17 g, Oral, Daily PRN, Kenneth Wei MD    potassium chloride (K-DUR,KLOR-CON) CR tablet 40 mEq, 40 mEq, Oral, Daily, Kenneth Wei MD, 40 mEq at 06/30/21 0924    ticagrelor (BRILINTA) tablet 90 mg, 90 mg, Oral, Q12H [facility], Kenneth Wei MD, 90 mg at 06/30/21 9869    Labs & Results:  Results from last 7 days   Lab Units 06/30/21  0442 06/29/21  2305   TROPONIN I ng/mL 1 42 0 13*         Results from last 7 days   Lab Units 06/30/21  0603 06/29/21  2305   POTASSIUM mmol/L 4 3 3 7   CO2 mmol/L 22 28   CHLORIDE mmol/L 105 103   BUN mg/dL 15 16   CREATININE mg/dL 0 95 1 13     Results from last 7 days   Lab Units 06/30/21  0855 06/29/21  2305   HEMOGLOBIN g/dL 16 2 16 6   HEMATOCRIT % 47 2 48 6   PLATELETS Thousands/uL 177 185     Results from last 7 days   Lab Units 06/29/21  2305   TRIGLYCERIDES mg/dL 334*   HDL mg/dL 30*   LDL CALC mg/dL 71   HEMOGLOBIN A1C % 5 4       Telemetry:   Personally reviewed by Krishna Talley MD:  Sinus rhythm with heart rate 60-70 and minimal PVCs    Imaging:  Cardiac catheterization films reviewed, pending transthoracic echocardiogram       VTE Prophylaxis: Enoxaparin (Lovenox) PPX       Assessment:  Principal Problem:    Acute ST elevation myocardial infarction (STEMI) due to occlusion of mid portion of left anterior descending (LAD) coronary artery ([facility] [address] )  Active Problems:    Type 1 myocardial infarction ([facility] [address] )      1  Type 1 anterior STEMI  - initial troponin 0 13  - 6/30 LHC:  90% mid LAD culprit lesion t/w 3 0 x 23 mm Xience ALVARADO, jailing D1 branch with ELOISE 3 flow; residual mild-mod nonobstructive mid LCX and prox RCA disease  - lipids:  T 168, , HDL 30, calculated LDL 71; A1c 5 4%  - aspirin, ticagrelor, metoprolol tartrate 25 mg b i d  Plan:  1  Patient is hypertensive this morning with SBP of 150  He endorses that his blood pressure has been 563 to 184J systolic at home  Initiated lisinopril 5 mg daily  2  Ticagrelor co-pay of 400 dollars, which patient is unable to afford as the recently purchased a house  Transition to clopidogrel with 300 mg load tonight and 75 mg daily starting tomorrow  3  Follow-up transthoracic echocardiogram read  Case discussed and reviewed with Dr Moni Cruz who agrees with my assessment and plan      Thank you for involving us in the care of your patient  Viv Amaya MD  Cardiology Fellow PGY-5      Epic/ Allscripts/Care Everywhere records reviewed: yes    ** Please Note: Fluency DirectDictation voice to text software may have been used in the creation of this document   **

## 2021-06-30 NOTE — LETTER
[address]  [facility] [address]  Dept: [phone]    July 1, 2021     Patient: Iggy Prabhakar   YOB: 1970   Date of Visit: 6/30/2021       To Whom it May Concern:    Iggy Prabhakar is under my professional care  He was seen in the hospital from 6/30/2021   to 07/01/21  He may return to work on 07/29/21 with the following limitations : moderate strenous activities       If you have any questions or concerns, please don't hesitate to call           Sincerely,          Loyda Pardo MD

## 2021-06-30 NOTE — EMTALA/ACUTE CARE TRANSFER
[facility] EMERGENCY DEPARTMENT  [address] [place] [id number]  Dept: [phone]      DVCFKF TRANSFER CONSENT    NAME Junior DE DIOS 1970                              MRN [id number]    I have been informed of my rights regarding examination, treatment, and transfer   by Dr Adriana Browne MD    Benefits: Specialized equipment and/or services available at the receiving facility (Include comment)________________________    Risks: Potential for delay in receiving treatment      Transfer Request   I acknowledge that my medical condition has been evaluated and explained to me by the emergency department physician or other qualified medical person and/or my attending physician who has recommended and offered to me further medical examination and treatment  I understand the Hospital's obligation with respect to the treatment and stabilization of my emergency medical condition  I nevertheless request to be transferred  I release the Hospital, the doctor, and any other persons caring for me from all responsibility or liability for any injury or ill effects that may result from my transfer and agree to accept all responsibility for the consequences of my choice to transfer, rather than receive stabilizing treatment at the Hospital  I understand that because the transfer is my request, my insurance may not provide reimbursement for the services  The Hospital will assist and direct me and my family in how to make arrangements for transfer, but the hospital is not liable for any fees charged by the transport service  In spite of this understanding, I refuse to consent to further medical examination and treatment which has been offered to me, and request transfer to  [facility] [address] : [facility] cath lab   I authorize the performance of emergency medical procedures and treatments upon me in both transit and upon arrival at the receiving facility  Additionally, I authorize the release of any and all medical records to the receiving facility and request they be transported with me, if possible  I authorize the performance of emergency medical procedures and treatments upon me in both transit and upon arrival at the receiving facility  Additionally, I authorize the release of any and all medical records to the receiving facility and request they be transported with me, if possible  I understand that the safest mode of transportation during a medical emergency is an ambulance and that the Hospital advocates the use of this mode of transport  Risks of traveling to the receiving facility by car, including absence of medical control, life sustaining equipment, such as oxygen, and medical personnel has been explained to me and I fully understand them  (CORY CORRECT BOX BELOW)  [ x ]  I consent to the stated transfer and to be transported by ambulance/helicopter  [  ]  I consent to the stated transfer, but refuse transportation by ambulance and accept full responsibility for my transportation by car  I understand the risks of non-ambulance transfers and I exonerate the Hospital and its staff from any deterioration in my condition that results from this refusal     X___________________________________________    DATE  21  TIME________  Signature of patient or legally responsible individual signing on patient behalf           RELATIONSHIP TO PATIENT_________________________          Provider Certification    NAME Ruben Alas                                         1970                              MRN [id number]    A medical screening exam was performed on the above named patient  Based on the examination:    Condition Necessitating Transfer The encounter diagnosis was Chest pain      Patient Condition: The patient has been stabilized such that within reasonable medical probability, no material deterioration of the patient condition or the condition of the unborn child(lisa) is likely to result from the transfer    Reason for Transfer: Level of Care needed not available at this facility    Transfer Requirements: Facility [facility] cath lab   · Space available and qualified personnel available for treatment as acknowledged by pacs  · Agreed to accept transfer and to provide appropriate medical treatment as acknowledged by       Dr Aimee Etienne  · Appropriate medical records of the examination and treatment of the patient are provided at the time of transfer   [address], [address] _______  · Transfer will be performed by qualified personnel from    and appropriate transfer equipment as required, including the use of necessary and appropriate life support measures  Provider Certification: I have examined the patient and explained the following risks and benefits of being transferred/refusing transfer to the patient/family:  General risk, such as traffic hazards, adverse weather conditions, rough terrain or turbulence, possible failure of equipment (including vehicle or aircraft), or consequences of actions of persons outside the control of the transport personnel      Based on these reasonable risks and benefits to the patient and/or the unborn child(lisa), and based upon the information available at the time of the patients examination, I certify that the medical benefits reasonably to be expected from the provision of appropriate medical treatments at another medical facility outweigh the increasing risks, if any, to the individuals medical condition, and in the case of labor to the unborn child, from effecting the transfer      X____________________________________________ DATE 06/29/21        TIME_______      ORIGINAL - SEND TO MEDICAL RECORDS   COPY - SEND WITH PATIENT DURING TRANSFER

## 2021-06-30 NOTE — MALNUTRITION/BMI
This medical record reflects one or more clinical indicators suggestive of malnutrition and/or morbid obesity  BMI Findings:  Adult BMI Classifications: Morbid Obesity 40-44 9     Body mass index is 43 05 kg/m²  See Nutrition note dated 6/30/21 for additional details  Completed nutrition assessment is viewable in the nutrition documentation

## 2021-06-30 NOTE — CASE MANAGEMENT
Consult received for price check of Brilinta and script was sent to [address]  Spoke to Luba Gonzalez who states cash discount price is $400  Informed cardiology

## 2021-07-01 VITALS
TEMPERATURE: 97.8 F | OXYGEN SATURATION: 95 % | WEIGHT: 300.05 LBS | SYSTOLIC BLOOD PRESSURE: 146 MMHG | DIASTOLIC BLOOD PRESSURE: 90 MMHG | HEART RATE: 67 BPM | RESPIRATION RATE: 20 BRPM | HEIGHT: 70 IN | BODY MASS INDEX: 42.96 KG/M2

## 2021-07-01 LAB
ANION GAP SERPL CALCULATED.3IONS-SCNC: 4 MMOL/L (ref 4–13)
ATRIAL RATE: 71 BPM
BUN SERPL-MCNC: 15 MG/DL (ref 5–25)
CALCIUM SERPL-MCNC: 8.9 MG/DL (ref 8.3–10.1)
CHLORIDE SERPL-SCNC: 105 MMOL/L (ref 100–108)
CO2 SERPL-SCNC: 26 MMOL/L (ref 21–32)
CREAT SERPL-MCNC: 0.8 MG/DL (ref 0.6–1.3)
ERYTHROCYTE [DISTWIDTH] IN BLOOD BY AUTOMATED COUNT: 12.9 % (ref 11.6–15.1)
GFR SERPL CREATININE-BSD FRML MDRD: 106 ML/MIN/1.73SQ M
GLUCOSE SERPL-MCNC: 143 MG/DL (ref 65–140)
HCT VFR BLD AUTO: 49 % (ref 36.5–49.3)
HGB BLD-MCNC: 16.5 G/DL (ref 12–17)
MAGNESIUM SERPL-MCNC: 2.3 MG/DL (ref 1.6–2.6)
MCH RBC QN AUTO: 30.8 PG (ref 26.8–34.3)
MCHC RBC AUTO-ENTMCNC: 33.7 G/DL (ref 31.4–37.4)
MCV RBC AUTO: 92 FL (ref 82–98)
P AXIS: 59 DEGREES
PLATELET # BLD AUTO: 175 THOUSANDS/UL (ref 149–390)
PMV BLD AUTO: 10 FL (ref 8.9–12.7)
POTASSIUM SERPL-SCNC: 3.6 MMOL/L (ref 3.5–5.3)
PR INTERVAL: 160 MS
QRS AXIS: 42 DEGREES
QRSD INTERVAL: 104 MS
QT INTERVAL: 418 MS
QTC INTERVAL: 454 MS
RBC # BLD AUTO: 5.35 MILLION/UL (ref 3.88–5.62)
SODIUM SERPL-SCNC: 135 MMOL/L (ref 136–145)
T WAVE AXIS: 43 DEGREES
TROPONIN I SERPL-MCNC: 2.66 NG/ML
VENTRICULAR RATE: 71 BPM
WBC # BLD AUTO: 11.11 THOUSAND/UL (ref 4.31–10.16)

## 2021-07-01 PROCEDURE — 99238 HOSP IP/OBS DSCHRG MGMT 30/<: CPT | Performed by: INTERNAL MEDICINE

## 2021-07-01 PROCEDURE — 84484 ASSAY OF TROPONIN QUANT: CPT | Performed by: STUDENT IN AN ORGANIZED HEALTH CARE EDUCATION/TRAINING PROGRAM

## 2021-07-01 PROCEDURE — 93005 ELECTROCARDIOGRAM TRACING: CPT

## 2021-07-01 PROCEDURE — NC001 PR NO CHARGE: Performed by: INTERNAL MEDICINE

## 2021-07-01 PROCEDURE — 83735 ASSAY OF MAGNESIUM: CPT | Performed by: STUDENT IN AN ORGANIZED HEALTH CARE EDUCATION/TRAINING PROGRAM

## 2021-07-01 PROCEDURE — 85027 COMPLETE CBC AUTOMATED: CPT | Performed by: STUDENT IN AN ORGANIZED HEALTH CARE EDUCATION/TRAINING PROGRAM

## 2021-07-01 PROCEDURE — 93010 ELECTROCARDIOGRAM REPORT: CPT | Performed by: INTERNAL MEDICINE

## 2021-07-01 PROCEDURE — 80048 BASIC METABOLIC PNL TOTAL CA: CPT | Performed by: STUDENT IN AN ORGANIZED HEALTH CARE EDUCATION/TRAINING PROGRAM

## 2021-07-01 RX ORDER — LISINOPRIL 5 MG/1
5 TABLET ORAL DAILY
Qty: 90 TABLET | Refills: 3 | Status: SHIPPED | OUTPATIENT
Start: 2021-07-02 | End: 2022-07-01

## 2021-07-01 RX ORDER — METOPROLOL SUCCINATE 50 MG/1
50 TABLET, EXTENDED RELEASE ORAL DAILY
Qty: 90 TABLET | Refills: 3 | Status: SHIPPED | OUTPATIENT
Start: 2021-07-01 | End: 2021-08-19 | Stop reason: ALTCHOICE

## 2021-07-01 RX ORDER — CLOPIDOGREL BISULFATE 75 MG/1
75 TABLET ORAL DAILY
Qty: 90 TABLET | Refills: 3 | Status: SHIPPED | OUTPATIENT
Start: 2021-07-02 | End: 2022-07-01

## 2021-07-01 RX ORDER — ASPIRIN 81 MG/1
81 TABLET ORAL DAILY
Qty: 90 TABLET | Refills: 3 | Status: SHIPPED | OUTPATIENT
Start: 2021-07-02 | End: 2022-07-01

## 2021-07-01 RX ORDER — ATORVASTATIN CALCIUM 40 MG/1
40 TABLET, FILM COATED ORAL
Qty: 90 TABLET | Refills: 3 | Status: SHIPPED | OUTPATIENT
Start: 2021-07-01

## 2021-07-01 RX ADMIN — POTASSIUM CHLORIDE 40 MEQ: 1500 TABLET, EXTENDED RELEASE ORAL at 09:37

## 2021-07-01 RX ADMIN — ENOXAPARIN SODIUM 40 MG: 40 INJECTION SUBCUTANEOUS at 09:37

## 2021-07-01 RX ADMIN — METOPROLOL TARTRATE 25 MG: 50 TABLET, FILM COATED ORAL at 09:37

## 2021-07-01 RX ADMIN — LISINOPRIL 5 MG: 5 TABLET ORAL at 09:37

## 2021-07-01 RX ADMIN — ASPIRIN 81 MG: 81 TABLET, COATED ORAL at 09:37

## 2021-07-01 RX ADMIN — CLOPIDOGREL BISULFATE 75 MG: 75 TABLET ORAL at 09:37

## 2021-07-01 NOTE — DISCHARGE SUMMARY
Discharge Summary - Chapin Pretty 52 y o  male MRN: [id number]    Unit/Bed#: -01 Encounter: [id number]    Admission Date: 6/30/2021   Discharge Date:  07/01/21    Disposition: Home      PCP: Elaina Pace MD  OP Cardiologist: Neha Stiles PA-C + Constantino Mtz MD (establishing)  Interventional cardiologist:  Yang Thurman MD    Discharge Diagnosis:  Type 1 anterior STEMI  Secondary Diagnoses:  Tobacco use disorder, dyslipidemia, essential hypertension    Condition at Discharge: good   Consultants:  none  Procedures:  Left heart catheterization, transthoracic echocardiogram     /90 (BP Location: Left arm)   Pulse 67   Temp 97 8 °F (36 6 °C) (Oral)   Resp 20   Ht 5' 10" (1 778 m)   Wt (!) 136 kg (300 lb 0 7 oz)   SpO2 95%   BMI 43 05 kg/m²     ROS  Physical Exam    See review of system and physical exam from 07/01/21 progress note      HPI and Hospital Course:   Chapin Pretty is a 52y o  year old male , employed as a traffic control agent, with a history of active tobacco use disorder and familial early onset heart disease presented to [facility] as a transfer from [employer] with worsening retrosternal chest discomfort, radiating to his throat, jaw and armpits bilaterally   His symptoms were ongoing for weeks to the point that lifting a road cone would cause discomfort   Emergent cardiac catheterization revealed a critical mid LAD culprit lesion at the D1 level we was treated with a 3 0 x 23 mm Xience ALVARADO, jailing the D1 branch with ELOISE 3 flow; residual nonobstructive LCX and prox RCA disease   Patient was admitted to [facility] step-down for further monitoring  Transthoracic echocardiogram revealed preserved biventricular function with no significant valvulopathy or wall motion abnormalities  P2Y12 was switched from ticagrelor to clopidogrel due to cost  Patient was deemed appropriate for discharge  Smoking cessation was emphasized and patient was amenable    He has trialed Chantix and Wellbutrin with some side effects  He was encouraged to establish primary care follow-up to help with community resources in that regard  Follow-up appointment was arranged in the [facility] office with Mike Quiroga PA-C and subsequently Vashti Harada, MD on 08/19  He is to abstain from work for the next 4 weeks until 7/28/21  Discharge Medications:  See after visit summary for reconciled discharge medications provided to patient and family          Pertinent Labs/diagnostics:  Results from last 7 days   Lab Units 07/01/21  1051 06/30/21  0442 06/29/21  2305   TROPONIN I ng/mL 2 66 1 42 0 13*       CBC with diff:   Results from last 7 days   Lab Units 07/01/21  1051 06/30/21  0855 06/29/21  2305   WBC Thousand/uL 11 11* 7 20 8 52   HEMOGLOBIN g/dL 16 5 16 2 16 6   HEMATOCRIT % 49 0 47 2 48 6   MCV fL 92 90 90   PLATELETS Thousands/uL 175 177 185   MCH pg 30 8 30 9 30 9   MCHC g/dL 33 7 34 3 34 2   RDW % 12 9 12 8 12 9   MPV fL 10 0 9 6 9 6   NRBC AUTO /100 WBCs  --   --  0         CMP:  Results from last 7 days   Lab Units 07/01/21  1051 06/30/21  0603 06/29/21  2305   POTASSIUM mmol/L 3 6 4 3 3 7   CHLORIDE mmol/L 105 105 103   CO2 mmol/L 26 22 28   BUN mg/dL 15 15 16   CREATININE mg/dL 0 80 0 95 1 13   CALCIUM mg/dL 8 9 8 7 8 4   AST U/L  --   --  24   ALT U/L  --   --  35   ALK PHOS U/L  --   --  89   EGFR ml/min/1 73sq m 106 94 76         Lipid Profile:   Results from last 7 days   Lab Units 06/30/21  1502 06/29/21  2305   TRIGLYCERIDES mg/dL  --  334*   LDL CALC mg/dL  --  71   LDL DIRECT mg/dl 124*  --          Cardiac testing:   Results for orders placed during the hospital encounter of 06/30/21    Echo complete with contrast if indicated    Narrative  [address]  Transthoracic Echocardiogram 2D, M-mode, Doppler, and Color Doppler  Study date:  30-Jun-2021    Sonographer:  Crissie Hashimoto, RCS  Primary Physician:  Kell Smith MD  Referring Physician:  Himanshu Conroy MD  Group:  [facility]  Interpreting Physician:  Lacie Roberts MD    SUMMARY    LEFT VENTRICLE:  Systolic function was normal  Ejection fraction was estimated to be 60 %  There were no regional wall motion abnormalities  VENTRICULAR SEPTUM:  There was dyssynergic motion  There was sigmoid septal appearance  MITRAL VALVE:  There was trace regurgitation  TRICUSPID VALVE:  There was trace regurgitation  HISTORY: PRIOR HISTORY: No       LEFT VENTRICLE: Size was normal  Systolic function was normal  Ejection fraction was estimated to be 60 %  There were no regional wall motion abnormalities  Wall thickness was normal  DOPPLER: Left ventricular diastolic function parameters  were normal     VENTRICULAR SEPTUM: There was dyssynergic motion  There was sigmoid septal appearance  RIGHT VENTRICLE: The size was normal  Systolic function was normal  Wall thickness was normal     LEFT ATRIUM: Size was normal     RIGHT ATRIUM: Size was normal     MITRAL VALVE: Valve structure was normal  There was normal leaflet separation  DOPPLER: The transmitral velocity was within the normal range  There was no evidence for stenosis  There was trace regurgitation  AORTIC VALVE: The valve was probably trileaflet  Leaflets exhibited normal thickness and normal cuspal separation  DOPPLER: Transaortic velocity was within the normal range  There was no evidence for stenosis  There was no significant regurgitation  TRICUSPID VALVE: The valve structure was normal  There was normal leaflet separation  DOPPLER: The transtricuspid velocity was within the normal range  There was no evidence for stenosis  There was trace regurgitation  PULMONIC VALVE: Leaflets exhibited normal thickness, no calcification, and normal cuspal separation  DOPPLER: The transpulmonic velocity was within the normal range  There was no significant regurgitation  PERICARDIUM: There was no pericardial effusion   The pericardium was normal in appearance  AORTA: The root exhibited normal size  SYSTEMIC VEINS: IVC: The inferior vena cava was not well visualized  [address] Accredited Echocardiography Laboratory    Prepared and electronically signed by  Eloy Esquivel MD  Signed 30-Jun-2021 13:57:58     cardiac catheterization formal report pending  No results found for this or any previous visit  No results found for this or any previous visit  Discharge instructions/Information to patient and family:   See after visit summary for information provided to patient and family  Provisions for Follow-Up Care:  See after visit summary for information related to follow-up care and any pertinent home health orders  Planned Readmission: No    Discharge Statement   I spent 20 minutes discharging the patient  This time was spent on the day of discharge  I had direct contact with the patient on the day of discharge  Additional documentation is required if more than 30 minutes were spent on discharge  ** Please Note: Dragon 360 Dictation voice to text software may have been used in the creation of this document   **

## 2021-07-01 NOTE — PROGRESS NOTES
Cardiology Team 2 Progress Note - Jesenia Betancourt 52 y o  male MRN: [id number]    Unit/Bed#: -01 Encounter: [id number]          Subjective:   Patient seen and examined  No significant telemetry events overnight  He overall feels much more improved with increased energy  He has no recurrent chest discomfort  Denies any lightheadedness, dizziness, presyncope, diaphoresis, nausea, vomiting, exertional dyspnea, orthopnea or lower extremity edema  He has been ambulating on the unit into the bathroom without any difficulty  Hospital Course:   Jesenia Betancourt is a 52y o  year old male , employed as a traffic control agent, with a history of active tobacco use disorder and familial early onset heart disease presented to [facility] as a transfer from [facility] with worsening retrosternal chest discomfort, radiating to his throat, jaw and armpits bilaterally  His symptoms were ongoing for weeks to the point that lifting a road cone would cause discomfort  Emergent cardiac catheterization revealed a critical mid LAD culprit lesion at the D1 level we was treated with a 3 0 x 23 mm Xience ALVARADO, jailing the D1 branch with ELOISE 3 flow; residual nonobstructive LCX and prox RCA disease  Patient was admitted to level step-down for further monitoring  Transthoracic echocardiogram revealed preserved biventricular function with no significant valvulopathy or wall motion abnormalities  Vitals: Blood pressure 146/90, pulse 67, temperature 97 8 °F (36 6 °C), temperature source Oral, resp  rate 20, height 5' 10" (1 778 m), weight (!) 136 kg (300 lb 0 7 oz), SpO2 95 %  , Body mass index is 43 05 kg/m² ,   Orthostatic Blood Pressures      Most Recent Value   Blood Pressure  146/90 filed at 07/01/2021 0730   Patient Position - Orthostatic VS  Lying filed at 07/01/2021 0730            Intake/Output Summary (Last 24 hours) at 7/1/2021 1006  Last data filed at 6/30/2021 1800  Gross per 24 hour   Intake 960 ml   Output 1200 ml   Net -240 ml         Physical Exam:    GEN: Chapin Pretty appears well, alert and oriented x 3, pleasant and cooperative   HEENT:  Normocephalic, atraumatic, anicteric, moist mucous membranes  NECK: No JVD or carotid bruits   HEART:  Regular rhythm, normal rate, normal S1 and S2, no murmurs  LUNGS: Clear to auscultation bilaterally; no wheezes, rales, or rhonchi; respiration nonlabored on room air  ABDOMEN:  Normoactive bowel sounds, soft, no tenderness; protuberant  EXTREMITIES: peripheral pulses palpable; no edema  NEURO: no gross focal findings; cranial nerves grossly intact   SKIN:  Dry, intact, warm to touch    ACCESS:  Right Radial artery has  + 2 pulse with brisk capillary refill  Neurovascular intact to  Right hand  Puncture site with Band-Aid and mild non-tender ecchymosis proximally which is unchanged from the day before        Current Facility-Administered Medications:     acetaminophen (TYLENOL) tablet 650 mg, 650 mg, Oral, Q8H PRN, Wendy Garcia MD    aspirin (ECOTRIN LOW STRENGTH) EC tablet 81 mg, 81 mg, Oral, Daily, Wendy Garcia MD, 81 mg at 07/01/21 6082    atorvastatin (LIPITOR) tablet 40 mg, 40 mg, Oral, Daily With Farida Pearson MD, 40 mg at 06/30/21 1602    clopidogrel (PLAVIX) tablet 75 mg, 75 mg, Oral, Daily, Wendy Garcia MD, 75 mg at 07/01/21 7078    enoxaparin (LOVENOX) subcutaneous injection 40 mg, 40 mg, Subcutaneous, Daily, Wendy Garcia MD, 40 mg at 07/01/21 2520    lisinopril (ZESTRIL) tablet 5 mg, 5 mg, Oral, Daily, Wendy Garcia MD, 5 mg at 07/01/21 5428    metoprolol tartrate (LOPRESSOR) tablet 25 mg, 25 mg, Oral, Q12H [address], Wendy Garcia MD, 25 mg at 07/01/21 0937    [MAR Hold] nicotine (NICODERM CQ) 21 mg/24 hr TD 24 hr patch 21 mg, 21 mg, Transdermal, Daily, ADELSO Ricci, 21 mg at 06/30/21 1602    polyethylene glycol (MIRALAX) packet 17 g, 17 g, Oral, Daily PRN, Wendy Garcia MD    potassium chloride (K-DUR,KLOR-CON) CR tablet 40 mEq, 40 mEq, Oral, Daily, Rivera Beatty MD, 40 mEq at 07/01/21 1541    Labs & Results:  Results from last 7 days   Lab Units 06/30/21  0442 06/29/21  2305   TROPONIN I ng/mL 1 42 0 13*         Results from last 7 days   Lab Units 06/30/21  0603 06/29/21  2305   POTASSIUM mmol/L 4 3 3 7   CO2 mmol/L 22 28   CHLORIDE mmol/L 105 103   BUN mg/dL 15 16   CREATININE mg/dL 0 95 1 13     Results from last 7 days   Lab Units 06/30/21  0855 06/29/21  2305   HEMOGLOBIN g/dL 16 2 16 6   HEMATOCRIT % 47 2 48 6   PLATELETS Thousands/uL 177 185     Results from last 7 days   Lab Units 06/29/21  2305   TRIGLYCERIDES mg/dL 334*   HDL mg/dL 30*   LDL CALC mg/dL 71   HEMOGLOBIN A1C % 5 4       Telemetry:   Personally reviewed by Rivera Beatty MD:  Normal sinus rhythm with heart rate ranging high 50s to 70s with no appreciable ectopy in last 12 hours  Imaging:  Transthoracic echocardiogram films reviewed      VTE Prophylaxis: Enoxaparin (Lovenox) PPX       Assessment:  Principal Problem:    Acute ST elevation myocardial infarction (STEMI) due to occlusion of mid portion of left anterior descending (LAD) coronary artery (HCC)  Active Problems:    Type 1 myocardial infarction ([address] )        1  Type 1 anterior STEMI  - initial troponin 0 13  - 6/30 LHC:  90% mid LAD culprit lesion t/w 3 0 x 23 mm Xience ALVARADO, jailing D1 branch with ELOISE 3 flow; residual mild-mod nonobstructive mid LCX and prox RCA disease  - 6/30 TTE:  LVEF 60%, no WMA, sigmoid septum, normal RV, no significant valvulopathy  - lipids:  T 168, , HDL 30, calculated LDL 71; A1c 5 4%  - aspirin,clopidogrel, lisinopril, metoprolol tartrate 25 mg b i d  Plan:  1  Patient is awaiting morning labs to be drawn  Otherwise, ease appropriate for discharge today  2  Transthoracic echocardiogram revealed preserved biventricular function with no wall motion abnormalities      3  He is tolerating lisinopril addition well with appropriate blood pressure  4  Follow-up appointments were arranged in our [place] office with Lorin Mario PA-C in 2 weeks and eventually Shiraz Aguayo MD in mid August       Case discussed and reviewed with Dr Letha Edwards who agrees with my assessment and plan  Thank you for involving us in the care of your patient  Margaux Cabrera MD  Cardiology Fellow PGY-6      Epic/ Allscripts/Care Everywhere records reviewed: yes    ** Please Note: Fluency DirectDictation voice to text software may have been used in the creation of this document   **

## 2021-07-01 NOTE — DISCHARGE INSTRUCTIONS
1  Please see the post angioplasty discharge instructions  No heavy lifting, greater than 10 lbs  or strenuous activity for 1 week  Follow angioplasty discharge instructions  2 Remove band aid tomorrow  Shower and wash area- wrist and groin gently with soap and water- beginning tomorrow  Rinse and pat dry  Apply new water seal band aid  Repeat this process for 5 days  No powders, creams lotions or antibiotic ointments  for 5 days  No tub baths, hot tubs or swimming for 5 days  3  Call [facility] ([phone]) if you develop a fever, redness or drainage at your wrist and groin access site  4  No driving for 2 days    5  Do not stop aspirin or Plavix (clopiogrel) any reason without a cardiologists consent, or the stent could block up and cause a heart attack  6  Stent card and book  Coronary Intravascular Stent Placement   WHAT YOU SHOULD KNOW:   Coronary intravascular stent placement is a procedure to place a stent in an artery of your heart that has plaque buildup  Plaque is a mixture of fat and cholesterol  A stent is a small mesh tube made of metal that helps keep your artery open  Your caregiver may place a bare metal stent or a drug-eluting stent (ALVARADO) in your artery  A ALVARADO is coated with medicine that is slowly released and helps prevent more plaque buildup in the area where the stent is placed  The stent remains in your artery for life  You may need more than one stent  AFTER YOU LEAVE:   Medicines: You will be given any of the following:  · Antiplatelets  prevent blood clots from forming  You will need to take aspirin and another type of platelet medicine  Take this medicine daily as directed  Do not stop taking aspirin or other type of antiplatelet medicine  · Nitrates , such as nitroglycerin, relax the arteries of your heart so it gets more oxygen  This medicine helps to relieve chest pain      · Cholesterol medicine  helps decrease the amount of cholesterol in your blood  · Blood pressure medicine  lowers your blood pressure  · Take your medicine as directed  Call your healthcare provider if you think your medicine is not helping or if you have side effects  Tell him if you are allergic to any medicine  Keep a list of the medicines, vitamins, and herbs you take  Include the amounts, and when and why you take them  Bring the list or the pill bottles to follow-up visits  Carry your medicine list with you in case of an emergency  Follow up with your cardiologist as directed:  Write down your questions so you remember to ask them during your visits  Activity:   · Avoid unnecessary stair climbing for 48 hours, if a catheter was put in your groin  · Do not place pressure on your arm, hand, or wrist, if the catheter was placed in your wrist  Avoid pushing, pulling, or heavy lifting with that arm  · If you need to cough, support the area where the catheter was inserted with your hand  · Ask your cardiologist how long you need to limit movement and avoid certain activities  · You may feel like resting more after your procedure  Slowly start to do more each day  Rest when you feel it is needed  Wound care:  Ask your cardiologist about how to care for your incision wound  Ask when you can get into a tub, shower, or pool  Do not smoke: If you smoke, it is never too late to quit  Smoking increases your risk for heart disease and stroke  Ask your cardiologist for information if you need help quitting  Cardiac rehab:  Your cardiologist may recommend that you attend cardiac rehabilitation (rehab)  This is a program run by specialists who will help you safely strengthen your heart and reduce the risk of more heart disease  The plan includes exercise, relaxation, stress management, and heart-healthy nutrition  Caregivers will also check to make sure any medicines you are taking are working     Contact your cardiologist if:   · You have a fever or chills  · You have questions or concerns about your condition or care  Seek care immediately or call 911 if:   · Your leg or arm, used for the procedure, becomes numb or turns white or blue  · The area where the catheter was placed is swollen, red, or has pus or foul-smelling fluid coming from it  · Your arm or leg feels warm, tender, and painful  It may look swollen and red  · You start to bleed from your catheter site again  · You have any of the following signs of a heart attack:     ¨ Squeezing, pressure, fullness, or pain in your chest that lasts longer than a few minutes or returns     ¨ Discomfort or pain in your back, neck, jaw, stomach, or arm    ¨ Shortness of breath or breathing problems    ¨ A sudden cold sweat, lightheadedness, dizziness, or nausea, especially with chest pain or trouble breathing    · You have any of the following signs of a stroke:     ¨ Part of your face droops or is numb    ¨ Weakness in an arm or leg    ¨ Confusion or difficulty speaking    ¨ Dizziness, a severe headache, or vision loss  © 2014 [address] is for End User's use only and may not be sold, redistributed or otherwise used for commercial purposes  All illustrations and images included in CareNotes® are the copyrighted property of [employer] M [employer]  or [employer]  The above information is an  only  It is not intended as medical advice for individual conditions or treatments  Talk to your doctor, nurse or pharmacist before following any medical regimen to see if it is safe and effective for you  Moderate Sedation   WHAT YOU NEED TO KNOW:   Moderate sedation, or conscious sedation, is medicine used during procedures to help you feel relaxed and calm  You will be awake and able to follow directions without anxiety or pain  You will remember little to none of the procedure  You may feel tired, weak, or unsteady on your feet after you get sedation   You may also have trouble concentrating or short-term memory loss  These symptoms should go away in 24 hours or less  DISCHARGE INSTRUCTIONS:   Call 911 or have someone else call for any of the following:   · You have sudden trouble breathing  · You cannot be woken  Return to the emergency department if:   · You have a severe headache or dizziness  · Your heart is beating faster than usual     Contact your healthcare provider if:   · You have a fever  · You have nausea or are vomiting for more than 8 hours after the procedure  · Your skin is itchy, swollen, or you have a rash  · You have questions or concerns about your condition or care  Self-care:   · Have someone stay with you for 24 hours  This person can drive you to errands and help you do things around the house  This person can also watch for problems  · Rest and do quiet activities for 24 hours  Do not exercise, ride a bike, or play sports  Stand up slowly to prevent dizziness and falls  Take short walks around the house with another person  Slowly return to your usual activities the next day  · Do not drive or use dangerous machines or tools for 24 hours  You may injure yourself or others  Examples include a lawnmower, saw, or drill  Do not return to work for 24 hours if you use dangerous machines or tools for work  · Do not make important decisions for 24 hours  For example, do not sign important papers or invest money  · Drink liquids as directed  Liquids help flush the sedation medicine out of your body  Ask how much liquid to drink each day and which liquids are best for you  · Eat small, frequent meals to prevent nausea and vomiting  Start with clear liquids such as juice or broth  If you do not vomit after clear liquids, you can eat your usual foods  · Do not drink alcohol or take medicines that make you drowsy  This includes medicines that help you sleep and anxiety medicines   Ask your healthcare provider if it is safe for you to take pain medicine  Follow up with your healthcare provider as directed:  Write down your questions so you remember to ask them during your visits  © Copyright [address] Information is for End User's use only and may not be sold, redistributed or otherwise used for commercial purposes  All illustrations and images included in CareNotes® are the copyrighted property of A [employer] M [employer] Inc  or [facility] Katherine Wilkes   The above information is an  only  It is not intended as medical advice for individual conditions or treatments  Talk to your doctor, nurse or pharmacist before following any medical regimen to see if it is safe and effective for you

## 2021-07-01 NOTE — PLAN OF CARE
Problem: Potential for Falls  Goal: Patient will remain free of falls  Description: INTERVENTIONS:  - Educate patient/family on patient safety including physical limitations  - Instruct patient to call for assistance with activity   - Consult OT/PT to assist with strengthening/mobility   - Keep Call bell within reach  - Keep bed low and locked with side rails adjusted as appropriate  - Keep care items and personal belongings within reach  - Initiate and maintain comfort rounds  - Make Fall Risk Sign visible to staff  - Offer Toileting every  Hours, in advance of need  - Initiate/Maintain alarm  - Obtain necessary fall risk management equipment:   - Apply yellow socks and bracelet for high fall risk patients  - Consider moving patient to room near nurses station  Outcome: Progressing     Problem: PAIN - ADULT  Goal: Verbalizes/displays adequate comfort level or baseline comfort level  Description: Interventions:  - Encourage patient to monitor pain and request assistance  - Assess pain using appropriate pain scale  - Administer analgesics based on type and severity of pain and evaluate response  - Implement non-pharmacological measures as appropriate and evaluate response  - Consider cultural and social influences on pain and pain management  - Notify physician/advanced practitioner if interventions unsuccessful or patient reports new pain  Outcome: Progressing     Problem: INFECTION - ADULT  Goal: Absence or prevention of progression during hospitalization  Description: INTERVENTIONS:  - Assess and monitor for signs and symptoms of infection  - Monitor lab/diagnostic results  - Monitor all insertion sites, i e  indwelling lines, tubes, and drains  - Monitor endotracheal if appropriate and nasal secretions for changes in amount and color  - [place] appropriate cooling/warming therapies per order  - Administer medications as ordered  - Instruct and encourage patient and family to use good hand hygiene technique  - Identify and instruct in appropriate isolation precautions for identified infection/condition  Outcome: Progressing  Goal: Absence of fever/infection during neutropenic period  Description: INTERVENTIONS:  - Monitor WBC    Outcome: Progressing     Problem: SAFETY ADULT  Goal: Patient will remain free of falls  Description: INTERVENTIONS:  - Educate patient/family on patient safety including physical limitations  - Instruct patient to call for assistance with activity   - Consult OT/PT to assist with strengthening/mobility   - Keep Call bell within reach  - Keep bed low and locked with side rails adjusted as appropriate  - Keep care items and personal belongings within reach  - Initiate and maintain comfort rounds  - Make Fall Risk Sign visible to staff  - Offer Toileting every  Hours, in advance of need  - Initiate/Maintain alarm  - Obtain necessary fall risk management equipment:   - Apply yellow socks and bracelet for high fall risk patients  - Consider moving patient to room near nurses station  Outcome: Progressing  Goal: Maintain or return to baseline ADL function  Description: INTERVENTIONS:  -  Assess patient's ability to carry out ADLs; assess patient's baseline for ADL function and identify physical deficits which impact ability to perform ADLs (bathing, care of mouth/teeth, toileting, grooming, dressing, etc )  - Assess/evaluate cause of self-care deficits   - Assess range of motion  - Assess patient's mobility; develop plan if impaired  - Assess patient's need for assistive devices and provide as appropriate  - Encourage maximum independence but intervene and supervise when necessary  - Involve family in performance of ADLs  - Assess for home care needs following discharge   - Consider OT consult to assist with ADL evaluation and planning for discharge  - Provide patient education as appropriate  Outcome: Progressing  Goal: Maintains/Returns to pre admission functional level  Description: INTERVENTIONS:  - Perform BMAT or MOVE assessment daily    - Set and communicate daily mobility goal to care team and patient/family/caregiver  - Collaborate with rehabilitation services on mobility goals if consulted  - Perform Range of Motion  times a day  - Reposition patient every  hours  - Dangle patient  times a day  - Stand patient  times a day  - Ambulate patient  times a day  - Out of bed to chair  times a day   - Out of bed for meals  times a day  - Out of bed for toileting  - Record patient progress and toleration of activity level   Outcome: Progressing     Problem: DISCHARGE PLANNING  Goal: Discharge to home or other facility with appropriate resources  Description: INTERVENTIONS:  - Identify barriers to discharge w/patient and caregiver  - Arrange for needed discharge resources and transportation as appropriate  - Identify discharge learning needs (meds, wound care, etc )  - Arrange for interpretive services to assist at discharge as needed  - Refer to Case Management Department for coordinating discharge planning if the patient needs post-hospital services based on physician/advanced practitioner order or complex needs related to functional status, cognitive ability, or social support system  Outcome: Progressing     Problem: Knowledge Deficit  Goal: Patient/family/caregiver demonstrates understanding of disease process, treatment plan, medications, and discharge instructions  Description: Complete learning assessment and assess knowledge base    Interventions:  - Provide teaching at level of understanding  - Provide teaching via preferred learning methods  Outcome: Progressing     Problem: NEUROSENSORY - ADULT  Goal: Achieves stable or improved neurological status  Description: INTERVENTIONS  - Monitor and report changes in neurological status  - Monitor vital signs such as temperature, blood pressure, glucose, and any other labs ordered   - Initiate measures to prevent increased intracranial pressure  - Monitor for seizure activity and implement precautions if appropriate      Outcome: Progressing  Goal: Remains free of injury related to seizures activity  Description: INTERVENTIONS  - Maintain airway, patient safety  and administer oxygen as ordered  - Monitor patient for seizure activity, document and report duration and description of seizure to physician/advanced practitioner  - If seizure occurs,  ensure patient safety during seizure  - Reorient patient post seizure  - Seizure pads on all 4 side rails  - Instruct patient/family to notify RN of any seizure activity including if an aura is experienced  - Instruct patient/family to call for assistance with activity based on nursing assessment  - Administer anti-seizure medications if ordered    Outcome: Progressing  Goal: Achieves maximal functionality and self care  Description: INTERVENTIONS  - Monitor swallowing and airway patency with patient fatigue and changes in neurological status  - Encourage and assist patient to increase activity and self care     - Encourage visually impaired, hearing impaired and aphasic patients to use assistive/communication devices  Outcome: Progressing     Problem: CARDIOVASCULAR - ADULT  Goal: Maintains optimal cardiac output and hemodynamic stability  Description: INTERVENTIONS:  - Monitor I/O, vital signs and rhythm  - Monitor for S/S and trends of decreased cardiac output  - Administer and titrate ordered vasoactive medications to optimize hemodynamic stability  - Assess quality of pulses, skin color and temperature  - Assess for signs of decreased coronary artery perfusion  - Instruct patient to report change in severity of symptoms  Outcome: Progressing  Goal: Absence of cardiac dysrhythmias or at baseline rhythm  Description: INTERVENTIONS:  - Continuous cardiac monitoring, vital signs, obtain 12 lead EKG if ordered  - Administer antiarrhythmic and heart rate control medications as ordered  - Monitor electrolytes and administer replacement therapy as ordered  Outcome: Progressing     Problem: RESPIRATORY - ADULT  Goal: Achieves optimal ventilation and oxygenation  Description: INTERVENTIONS:  - Assess for changes in respiratory status  - Assess for changes in mentation and behavior  - Position to facilitate oxygenation and minimize respiratory effort  - Oxygen administered by appropriate delivery if ordered  - Initiate smoking cessation education as indicated  - Encourage broncho-pulmonary hygiene including cough, deep breathe, Incentive Spirometry  - Assess the need for suctioning and aspirate as needed  - Assess and instruct to report SOB or any respiratory difficulty  - Respiratory Therapy support as indicated  Outcome: Progressing     Problem: GASTROINTESTINAL - ADULT  Goal: Minimal or absence of nausea and/or vomiting  Description: INTERVENTIONS:  - Administer IV fluids if ordered to ensure adequate hydration  - Maintain NPO status until nausea and vomiting are resolved  - Nasogastric tube if ordered  - Administer ordered antiemetic medications as needed  - Provide nonpharmacologic comfort measures as appropriate  - Advance diet as tolerated, if ordered  - Consider nutrition services referral to assist patient with adequate nutrition and appropriate food choices  Outcome: Progressing  Goal: Maintains or returns to baseline bowel function  Description: INTERVENTIONS:  - Assess bowel function  - Encourage oral fluids to ensure adequate hydration  - Administer IV fluids if ordered to ensure adequate hydration  - Administer ordered medications as needed  - Encourage mobilization and activity  - Consider nutritional services referral to assist patient with adequate nutrition and appropriate food choices  Outcome: Progressing  Goal: Maintains adequate nutritional intake  Description: INTERVENTIONS:  - Monitor percentage of each meal consumed  - Identify factors contributing to decreased intake, treat as appropriate  - Assist with meals as needed  - Monitor I&O, weight, and lab values if indicated  - Obtain nutrition services referral as needed  Outcome: Progressing  Goal: Establish and maintain optimal ostomy function  Description: INTERVENTIONS:  - Assess bowel function  - Encourage oral fluids to ensure adequate hydration  - Administer IV fluids if ordered to ensure adequate hydration   - Administer ordered medications as needed  - Encourage mobilization and activity  - Nutrition services referral to assist patient with appropriate food choices  - Assess stoma site  - Consider wound care consult   Outcome: Progressing  Goal: Oral mucous membranes remain intact  Description: INTERVENTIONS  - Assess oral mucosa and hygiene practices  - Implement preventative oral hygiene regimen  - Implement oral medicated treatments as ordered  - Initiate Nutrition services referral as needed  Outcome: Progressing     Problem: GENITOURINARY - ADULT  Goal: Maintains or returns to baseline urinary function  Description: INTERVENTIONS:  - Assess urinary function  - Encourage oral fluids to ensure adequate hydration if ordered  - Administer IV fluids as ordered to ensure adequate hydration  - Administer ordered medications as needed  - Offer frequent toileting  - Follow urinary retention protocol if ordered  Outcome: Progressing  Goal: Absence of urinary retention  Description: INTERVENTIONS:  - Assess patients ability to void and empty bladder  - Monitor I/O  - Bladder scan as needed  - Discuss with physician/AP medications to alleviate retention as needed  - Discuss catheterization for long term situations as appropriate  Outcome: Progressing  Goal: Urinary catheter remains patent  Description: INTERVENTIONS:  - Assess patency of urinary catheter  - If patient has a chronic contreras, consider changing catheter if non-functioning  - Follow guidelines for intermittent irrigation of non-functioning urinary catheter  Outcome: Progressing     Problem: METABOLIC, FLUID AND ELECTROLYTES - ADULT  Goal: Electrolytes maintained within normal limits  Description: INTERVENTIONS:  - Monitor labs and assess patient for signs and symptoms of electrolyte imbalances  - Administer electrolyte replacement as ordered  - Monitor response to electrolyte replacements, including repeat lab results as appropriate  - Instruct patient on fluid and nutrition as appropriate  Outcome: Progressing  Goal: Fluid balance maintained  Description: INTERVENTIONS:  - Monitor labs   - Monitor I/O and WT  - Instruct patient on fluid and nutrition as appropriate  - Assess for signs & symptoms of volume excess or deficit  Outcome: Progressing  Goal: Glucose maintained within target range  Description: INTERVENTIONS:  - Monitor Blood Glucose as ordered  - Assess for signs and symptoms of hyperglycemia and hypoglycemia  - Administer ordered medications to maintain glucose within target range  - Assess nutritional intake and initiate nutrition service referral as needed  Outcome: Progressing     Problem: SKIN/TISSUE INTEGRITY - ADULT  Goal: Skin Integrity remains intact(Skin Breakdown Prevention)  Description: Assess:  -Perform Thad assessment every   -Clean and moisturize skin every   -Inspect skin when repositioning, toileting, and assisting with ADLS  -Assess under medical devices such as  every   -Assess extremities for adequate circulation and sensation     Bed Management:  -Have minimal linens on bed & keep smooth, unwrinkled  -Change linens as needed when moist or perspiring  -Avoid sitting or lying in one position for more than  hours while in bed  -Keep HOB at degrees     Toileting:  -Offer bedside commode  -Assess for incontinence every   -Use incontinent care products after each incontinent episode such as     Activity:  -Mobilize patient  times a day  -Encourage activity and walks on unit  -Encourage or provide ROM exercises   -Turn and reposition patient every Hours  -Use appropriate equipment to lift or move patient in bed  -Instruct/ Assist with weight shifting every  when out of bed in chair  -Consider limitation of chair time  hour intervals    Skin Care:  -Avoid use of baby powder, tape, friction and shearing, hot water or constrictive clothing  -Relieve pressure over bony prominences using   -Do not massage red bony areas    Next Steps:  -Teach patient strategies to minimize risks such as    -Consider consults to  interdisciplinary teams such as   Outcome: Progressing  Goal: Incision(s), wounds(s) or drain site(s) healing without S/S of infection  Description: INTERVENTIONS  - Assess and document dressing, incision, wound bed, drain sites and surrounding tissue  - Provide patient and family education  - Perform skin care/dressing changes every   Outcome: Progressing  Goal: Pressure injury heals and does not worsen  Description: Interventions:  - Implement low air loss mattress or specialty surface (Criteria met)  - Apply silicone foam dressing  - Instruct/assist with weight shifting every  minutes when in chair   - Limit chair time to  hour intervals  - Use special pressure reducing interventions such as  when in chair   - Apply fecal or urinary incontinence containment device   - Perform passive or active ROM every   - Turn and reposition patient & offload bony prominences every  hours   - Utilize friction reducing device or surface for transfers   - Consider consults to  interdisciplinary teams such as   - Use incontinent care products after each incontinent episode such as   - Consider nutrition services referral as needed  Outcome: Progressing     Problem: HEMATOLOGIC - ADULT  Goal: Maintains hematologic stability  Description: INTERVENTIONS  - Assess for signs and symptoms of bleeding or hemorrhage  - Monitor labs  - Administer supportive blood products/factors as ordered and appropriate  Outcome: Progressing     Problem: MUSCULOSKELETAL - ADULT  Goal: Maintain or return mobility to safest level of function  Description: INTERVENTIONS:  - Assess patient's ability to carry out ADLs; assess patient's baseline for ADL function and identify physical deficits which impact ability to perform ADLs (bathing, care of mouth/teeth, toileting, grooming, dressing, etc )  - Assess/evaluate cause of self-care deficits   - Assess range of motion  - Assess patient's mobility  - Assess patient's need for assistive devices and provide as appropriate  - Encourage maximum independence but intervene and supervise when necessary  - Involve family in performance of ADLs  - Assess for home care needs following discharge   - Consider OT consult to assist with ADL evaluation and planning for discharge  - Provide patient education as appropriate  Outcome: Progressing  Goal: Maintain proper alignment of affected body part  Description: INTERVENTIONS:  - Support, maintain and protect limb and body alignment  - Provide patient/ family with appropriate education  Outcome: Progressing     Problem: MOBILITY - ADULT  Goal: Maintain or return to baseline ADL function  Description: INTERVENTIONS:  -  Assess patient's ability to carry out ADLs; assess patient's baseline for ADL function and identify physical deficits which impact ability to perform ADLs (bathing, care of mouth/teeth, toileting, grooming, dressing, etc )  - Assess/evaluate cause of self-care deficits   - Assess range of motion  - Assess patient's mobility; develop plan if impaired  - Assess patient's need for assistive devices and provide as appropriate  - Encourage maximum independence but intervene and supervise when necessary  - Involve family in performance of ADLs  - Assess for home care needs following discharge   - Consider OT consult to assist with ADL evaluation and planning for discharge  - Provide patient education as appropriate  Outcome: Progressing  Goal: Maintains/Returns to pre admission functional level  Description: INTERVENTIONS:  - Perform BMAT or MOVE assessment daily    - Set and communicate daily mobility goal to care team and patient/family/caregiver  - Collaborate with rehabilitation services on mobility goals if consulted  - Perform Range of Motion  times a day  - Reposition patient every  hours    - Dangle patient  times a day  - Stand patient  times a day  - Ambulate patient  times a day  - Out of bed to chair  times a day   - Out of bed for meals  times a day  - Out of bed for toileting  - Record patient progress and toleration of activity level   Outcome: Progressing

## 2021-07-12 NOTE — PROGRESS NOTES
Cardiology Follow Up    Humberto Mora  1970  [id number]  [address] [facility] [place] [address]  [phone]    1  Coronary artery disease involving native coronary artery of native heart without angina pectoris  nitroglycerin (NITROSTAT) 0 4 mg SL tablet   2  STEMI involving left anterior descending coronary artery (HCC)  nitroglycerin (NITROSTAT) 0 4 mg SL tablet   3  Encounter for monitoring ACE-inhibitor therapy  Basic metabolic panel       Discussion/Summary:  Coronary artery disease: status post ALVARADO to the LAD  Continue aspirin and plavix, statin, and beta-blocker therapy  Will have patient attend a few cardiac rehabilitation sessions prior to returning back to work  Hypertension: diastolic BP mildly elevated, will increase lisinopril to 10 mg daily  BMP in 1 week  Dyslipidemia: last LDL was 71, continue statin therapy  Tobacco abuse: patient has stopped smoking cigarettes and is now vaping with attempts to be completely nicotine free  He does not have health insurance currently  He will RTO in 1 month for his scheduled f/u visit with Dr Ramiro Howell or sooner if necessary  He will call with any concerns  Interval History:   Humberto Mora is a 52 y o  male with coronary artery disease - recent STEMI s/p ALVARADO to the LAD, tobacco abuse who presents to the office today for hospital follow up  He was recently hospitalized with progressive anginal symptoms  He was found to have an anterior STEMI and received a drug eluding stent to the LAD  His other arteries showed mild-moderate non-obstructive disease  Echocardiogram revealed preserved LV function  He was placed on DAPT, statin, and beta-blocker therapy  Since hospital discharge he has not been feeling himself  He has been having some dyspnea on exertion  He has not had any chest discomfort reminiscent of his heart attack or in the days preceding   He denies lower extremity edema, orthopnea, and PND  He denies lightheadedness, dizziness, palpitations, and syncope  He has been taking all medications as prescribed  He has not had a cigarette since discharge but he is vaping in attempts at quitting altogether  Medical Problems     Problem List     Type 1 myocardial infarction [facility])    Acute ST elevation myocardial infarction (STEMI) due to occlusion of mid portion of left anterior descending (LAD) coronary artery [facility])              Past Medical History:   Diagnosis Date    Heart attack ([facility] [address] ) 2021     Social History     Socioeconomic History    Marital status: Single     Spouse name: Not on file    Number of children: Not on file    Years of education: Not on file    Highest education level: Not on file   Occupational History    Not on file   Tobacco Use    Smoking status: Former Smoker     Packs/day: 2 00     Years: 22 00     Pack years: 44 00     Quit date: 2021     Years since quittin 0    Smokeless tobacco: Never Used   Vaping Use    Vaping Use: Every day    Substances: Nicotine   Substance and Sexual Activity    Alcohol use: Not Currently    Drug use: Not Currently     Types: Marijuana     Comment: used once last week    Sexual activity: Not on file   Other Topics Concern    Not on file   Social History Narrative    Not on file     Social Determinants of Health     Financial Resource Strain:     Difficulty of Paying Living Expenses:    Food Insecurity:     Worried About Running Out of Food in the Last Year:     Ran Out of Food in the Last Year:    Transportation Needs:     Lack of Transportation (Medical):      Lack of Transportation (Non-Medical):    Physical Activity:     Days of Exercise per Week:     Minutes of Exercise per Session:    Stress:     Feeling of Stress :    Social Connections:     Frequency of Communication with Friends and Family:     Frequency of Social Gatherings with Friends and Family:     Attends Hoahaoism Services:     Active Member of Clubs or Organizations:     Attends Club or Organization Meetings:     Marital Status:    Intimate Partner Violence:     Fear of Current or Ex-Partner:     Emotionally Abused:     Physically Abused:     Sexually Abused:       Family History   Problem Relation Age of Onset    Hypertension Mother     Breast cancer Mother     Heart attack Father     Hyperlipidemia Father     Hyperlipidemia Sister     Heart disease Maternal Grandmother     Heart disease Maternal Grandfather     Heart attack Maternal Grandfather      Past Surgical History:   Procedure Laterality Date    CORONARY ANGIOPLASTY      FINGER FRACTURE SURGERY      MOUTH SURGERY         Current Outpatient Medications:     aspirin (ECOTRIN LOW STRENGTH) 81 mg EC tablet, Take 1 tablet (81 mg total) by mouth daily, Disp: 90 tablet, Rfl: 3    atorvastatin (LIPITOR) 40 mg tablet, Take 1 tablet (40 mg total) by mouth daily with dinner, Disp: 90 tablet, Rfl: 3    clopidogrel (PLAVIX) 75 mg tablet, Take 1 tablet (75 mg total) by mouth daily, Disp: 90 tablet, Rfl: 3    lisinopril (ZESTRIL) 5 mg tablet, Take 1 tablet (5 mg total) by mouth daily, Disp: 90 tablet, Rfl: 3    metoprolol succinate (TOPROL-XL) 50 mg 24 hr tablet, Take 1 tablet (50 mg total) by mouth daily, Disp: 90 tablet, Rfl: 3    nitroglycerin (NITROSTAT) 0 4 mg SL tablet, Place 1 tablet (0 4 mg total) under the tongue every 5 (five) minutes as needed for chest pain, Disp: 30 tablet, Rfl: 0  No Known Allergies    Labs:     Chemistry        Component Value Date/Time    K 3 6 07/01/2021 1051     07/01/2021 1051    CO2 26 07/01/2021 1051    BUN 15 07/01/2021 1051    CREATININE 0 80 07/01/2021 1051        Component Value Date/Time    CALCIUM 8 9 07/01/2021 1051    ALKPHOS 89 06/29/2021 2305    AST 24 06/29/2021 2305    ALT 35 06/29/2021 2305            No results found for: CHOL  Lab Results   Component Value Date    HDL 30 (L) 06/29/2021 Lab Results   Component Value Date    LDLCALC 71 2021     Lab Results   Component Value Date    TRIG 334 (H) 2021     No results found for: CHOLHDL    Imaging: Echo complete with contrast if indicated    Result Date: 2021  Narrative: [address], [address] [phone] Transthoracic Echocardiogram 2D, M-mode, Doppler, and Color Doppler Study date:  2021 Patient: Marie Moreno MR number: [id number] Account number: [de-identified] : 1970 Age: 52 years Gender: Male Status: Inpatient Location: Bedside Height: 70 in Weight: 300 lb BP: 127/ 66 mmHg Indications: AMI  Diagnoses: I24 9 - Acute ischemic heart disease, unspecified Sonographer:  ANDIE Mane Primary Physician:  Shikha White MD Referring Physician:  Mable Arana MD Group:  [facility] Interpreting Physician:  Ezekiel Valencia MD SUMMARY LEFT VENTRICLE: Systolic function was normal  Ejection fraction was estimated to be 60 %  There were no regional wall motion abnormalities  VENTRICULAR SEPTUM: There was dyssynergic motion  There was sigmoid septal appearance  MITRAL VALVE: There was trace regurgitation  TRICUSPID VALVE: There was trace regurgitation  HISTORY: PRIOR HISTORY: No  PROCEDURE: The procedure was performed at the bedside  This was a routine study  The transthoracic approach was used  The study included complete 2D imaging, M-mode, complete spectral Doppler, and color Doppler  The heart rate was 70 bpm, at the start of the study  Images were obtained from the parasternal, apical, subcostal, and suprasternal notch acoustic windows  This was a technically difficult study  LEFT VENTRICLE: Size was normal  Systolic function was normal  Ejection fraction was estimated to be 60 %  There were no regional wall motion abnormalities   Wall thickness was normal  DOPPLER: Left ventricular diastolic function parameters were normal  VENTRICULAR SEPTUM: There was dyssynergic motion  There was sigmoid septal appearance  RIGHT VENTRICLE: The size was normal  Systolic function was normal  Wall thickness was normal  LEFT ATRIUM: Size was normal  RIGHT ATRIUM: Size was normal  MITRAL VALVE: Valve structure was normal  There was normal leaflet separation  DOPPLER: The transmitral velocity was within the normal range  There was no evidence for stenosis  There was trace regurgitation  AORTIC VALVE: The valve was probably trileaflet  Leaflets exhibited normal thickness and normal cuspal separation  DOPPLER: Transaortic velocity was within the normal range  There was no evidence for stenosis  There was no significant regurgitation  TRICUSPID VALVE: The valve structure was normal  There was normal leaflet separation  DOPPLER: The transtricuspid velocity was within the normal range  There was no evidence for stenosis  There was trace regurgitation  PULMONIC VALVE: Leaflets exhibited normal thickness, no calcification, and normal cuspal separation  DOPPLER: The transpulmonic velocity was within the normal range  There was no significant regurgitation  PERICARDIUM: There was no pericardial effusion  The pericardium was normal in appearance  AORTA: The root exhibited normal size  SYSTEMIC VEINS: IVC: The inferior vena cava was not well visualized   SYSTEM MEASUREMENT TABLES 2D %FS: 35 06 % Ao Diam: 2 82 cm EDV(Teich): 103 11 ml EF(Teich): 64 33 % ESV(Teich): 36 78 ml IVSd: 0 79 cm LA Area: 13 66 cm2 LA Diam: 3 85 cm LVEDV MOD A4C: 100 96 ml LVEF MOD A4C: 63 52 % LVESV MOD A4C: 36 83 ml LVIDd: 4 71 cm LVIDs: 3 06 cm LVLd A4C: 8 73 cm LVLs A4C: 6 84 cm LVPWd: 0 97 cm RA Area: 8 6 cm2 RVIDd: 3 68 cm SV MOD A4C: 64 13 ml SV(Teich): 66 33 ml MM TAPSE: 2 27 cm PW E' Sept: 0 1 m/s E/E' Sept: 7 76 MV A Ramone: 1 03 m/s MV Dec Young: 3 62 m/s2 MV DecT: 210 53 ms MV E Ramone: 0 76 m/s MV E/A Ratio: 0 73 MV PHT: 61 05 ms MVA By PHT: 3 6 cm2 [facility] Acc[facility] Prepared and electronically signed by Nancy Thomas MD Signed 2021 13:57:58     XR chest 1 view portable    Result Date: 2021  Narrative: CHEST INDICATION:   chest pain  COMPARISON:  None EXAM PERFORMED/VIEWS:  XR CHEST PORTABLE FINDINGS: Cardiomediastinal silhouette appears unremarkable  The lungs are clear  No pneumothorax or pleural effusion  Osseous structures appear within normal limits for patient age  Impression: No acute cardiopulmonary disease  Workstation performed: [id number]     Cardiac catheterization    Result Date: 2021  Narrative: [address] [facility], [address] [place] [address] [phone] [facility] Invasive Cardiovascular Lab Complete Report Patient: Angelia Charlton MR number: [id number] Account number: [de-identified] Study date: 2021 Gender: Male : 1970 Height: 70 1 in Weight: 314 6 lb BSA: 2 53 mï¾² Allergies: NO KNOWN ALLERGIES Diagnostic Cardiologist:  Hermila Schwarz MD Interventional Cardiologist:  Hermila Schwarz MD Primary Physician:  Boaz Kumar MD SUMMARY CORONARY CIRCULATION: Left main: Normal  LAD: The vessel was medium sized  Mid LAD: There was a tubular 95 % stenosis at the origin of D1  The lesion was irregularly contoured and without evidence of thrombus  There was ELOISE grade 2 flow through the vessel (partial perfusion)  This is a likely culprit for the patient's clinical presentation  An intervention was performed  Circumflex: The vessel was medium to large sized  1st obtuse marginal: The vessel was medium sized  Angiography showed mild atherosclerosis  RCA: The vessel was medium sized  Angiography showed moderate atherosclerosis  1ST LESION INTERVENTIONS: A successful drug-eluting stent procedure was performed on the 95 % lesion in the mid LAD  Following intervention there was an excellent angiographic appearance with a 0 % residual stenosis  This was not a bifurcation lesion  This was an ACC/AHA type C "high risk" lesion for intervention  There was no evidence of the transient no-reflow phenomenon  The residual lesion was tubular  There was ELOISE 2 flow before the procedure and ELOISE 3 flow after the procedure  A Xience Mary Rx 3 0 x 23mm drug-eluting stent was placed across the lesion and deployed at a maximum inflation pressure of 15 saul  INDICATIONS: --  Possible CAD: myocardial infarction with ST elevation (STEMI)  PROCEDURES PERFORMED --  Left heart catheterization without ventriculogram  --  Left coronary angiography  --  Right coronary angiography  --  Acute Myocardial Infarct  --  Mod Sedation Same Physician Initial 15min  --  Mod Sedation Same Physician Add 15min  --  Mod Sedation Same Physician Add 15min  --  Mod Sedation Same Physician Add 15min  --  Coronary Catheterization (w/ LH)  --  AMI PCI (ALVARADO, PTCRA, PTCA) Single  --  Intervention on mid LAD: drug-eluting stent  PROCEDURE: The risks and alternatives of the procedures and conscious sedation were explained to the patient and informed consent was obtained  The patient was brought to the cath lab and placed on the table  The planned puncture sites were prepped and draped in the usual sterile fashion  --  Right radial artery access  After performing an García's test to verify adequate ulnar artery supply to the hand, the radial site was prepped  The puncture site was infiltrated with local anesthetic  The vessel was accessed using the modified Seldinger technique, a wire was advanced into the vessel, and a sheath was advanced over the wire into the vessel  --  Left heart catheterization without ventriculogram  A catheter was advanced over a guidewire into the ascending aorta  After recording ascending aortic pressure, the catheter was advanced across the aortic valve and left ventricular pressure was recorded   The catheter was pulled back across the aortic valve and into the ascending aorta and pullback pressures were obtained  --  Left coronary artery angiography  A catheter was advanced over a guidewire into the aorta and positioned in the left coronary artery ostium under fluoroscopic guidance  Angiography was performed  --  Right coronary artery angiography  A catheter was advanced over a guidewire into the aorta and positioned in the right coronary artery ostium under fluoroscopic guidance  Angiography was performed  --  Acute Myocardial Infarct  --  Mod Sedation Same Physician Initial 15min  --  Mod Sedation Same Physician Add 15min  --  Mod Sedation Same Physician Add 15min  --  Mod Sedation Same Physician Add 15min  --  Coronary Catheterization (w/ LHC)  LESION INTERVENTION: A successful drug-eluting stent procedure was performed on the 95 % lesion in the mid LAD  Following intervention there was an excellent angiographic appearance with a 0 % residual stenosis  This was not a bifurcation lesion  This was an ACC/AHA type C "high risk" lesion for intervention  There was no evidence of the transient no-reflow phenomenon  The residual lesion was tubular  There was ELOISE 2 flow before the procedure and ELOISE 3 flow after the procedure  There was no dissection  --  Vessel setup was performed  A 6Fr  Launcher Ebu 3 5 guiding catheter was used to cannulate the vessel  --  Vessel setup was performed  A Xobni 180cm wire was used to cross the lesion  --  Balloon angioplasty was performed, using a Trek Rx 2 5 x 12mm balloon, with 5 inflations and a maximum inflation pressure of 17 saul  --  A Xience Mary Rx 3 0 x 23mm drug-eluting stent was placed across the lesion and deployed at a maximum inflation pressure of 15 saul  --  Balloon angioplasty was performed, with 1 inflations and a maximum inflation pressure of 19 saul  INTERVENTIONS: --  AMI PCI (ALVARADO, PTCRA, PTCA) Single  PROCEDURE COMPLETION: The patient tolerated the procedure well and was discharged from the cath lab  TIMING: Test started at 00:37  Test concluded at 01:26  HEMOSTASIS: The sheath was removed  The site was compressed with a Hemoband device  Hemostasis was obtained  MEDICATIONS GIVEN: Metoprolol (Lopressor, Toprol), 5 mg, IV, at 01:19  Versed (2mg/2ml), 2 mg, IV, at 00:37  Fentanyl (1OOmcg/2 ml), 50 mcg, IV, at 00:37  1% Lidocaine, 1 ml, subcutaneously, at 00:44  Nitroglycerin (200mcg/ml), 200 mcg, at 00:45  Verapamil (5mg/2ml), 2 5 mg, IV, at 00:45  Heparin 1000 units/ml, 4,000 units, IV, at 00:45  Heparin 1000 units/ml, 8,000 units, IV, at 00:58  Versed (2mg/2ml), 2 mg, IV, at 01:11  Fentanyl (1OOmcg/2 ml), 25 mcg, IV, at 01:11  Nitroglycerine (200mcg/ml), 200 mcg, at 01:13  Nitroglycerine (200mcg/ml), 200 mcg, at 01:20  CONTRAST GIVEN: 130 ml Omnipaque (350 mg I /ml)  50 ml Omnipaque (350mg I /ml)  RADIATION EXPOSURE: Fluoroscopy time: 15 03 min  CORONARY VESSELS:   --  The coronary circulation is right dominant  --  Left main: Normal  --  LAD: The vessel was medium sized  --  Mid LAD: There was a tubular 95 % stenosis at the origin of D1  The lesion was irregularly contoured and without evidence of thrombus  There was ELOISE grade 2 flow through the vessel (partial perfusion)  This is a likely culprit for the patient's clinical presentation  An intervention was performed  --  1st diagonal: The vessel was small to medium sized  --  2nd diagonal: The vessel was small sized  --  Circumflex: The vessel was medium to large sized  --  Mid circumflex: There was a tubular 40 % stenosis in the proximal third of the vessel segment  --  1st obtuse marginal: The vessel was medium sized  Angiography showed mild atherosclerosis  --  1st left posterolateral: The vessel was small sized  --  RCA: The vessel was medium sized  Angiography showed moderate atherosclerosis  RECOMMENDATIONS 1  ASA 81 mg daily 2  Brilinta 90 mg BID 3  ECHO for LV function 4  Potent statin therapy 5   Smoking cessation Prepared and signed by Mehran Martinez MD Signed 07/12/2021 09:52:55 Study diagram Angiographic findings Native coronary lesions: ï¾·Mid LAD: Lesion 1: tubular, 95 % stenosis  ï¾·Mid circumflex: Lesion 1: tubular, 40 % stenosis  Intervention results Native coronary lesions: ï¾·Successful drug-eluting stent of the 95 % stenosis in mid LAD  Appearance excellent with 0 % residual stenosis  Stent: Linda Maria Rx 3 0 x 23mm drug-eluting  Hemodynamic tables Pressures:  Baseline Pressures:  - HR: 88 Pressures:  - Rhythm: Pressures:  -- Aortic Pressure (S/D/M): 138/95/115 Pressures:  -- Left Ventricle (s/edp): 113/48/-- Outputs:  Baseline Outputs:  -- CALCULATIONS[age] Outputs:  -- CALCULATIONS: Body Surface Area: 2 53 Outputs:  -- CALCULATIONS: Height in cm: 178 00 Outputs:  -- CALCULATIONS: Sex: Male Outputs:  -- CALCULATIONS: Weight in k 00             Review of Systems   Constitutional: Negative for chills and fever  HENT: Negative  Cardiovascular: Positive for dyspnea on exertion  Negative for chest pain, near-syncope, palpitations and syncope  Respiratory: Negative for cough and shortness of breath  Gastrointestinal: Negative for diarrhea, nausea and vomiting  Genitourinary: Negative  Neurological: Negative for dizziness and light-headedness  All other systems reviewed and are negative  Vitals:    21 1415   BP: 126/86   Pulse: 72     Vitals:    21 1415   Weight: 135 kg (297 lb)     Height: 5' 10" (177 8 cm)   Body mass index is 42 62 kg/m²  Physical Exam:  Physical Exam  Vitals reviewed  Constitutional:       General: He is not in acute distress  Appearance: He is well-developed  He is obese  He is not diaphoretic  HENT:      Head: Normocephalic and atraumatic  Eyes:      Pupils: Pupils are equal, round, and reactive to light  Neck:      Vascular: No carotid bruit  Cardiovascular:      Rate and Rhythm: Normal rate and regular rhythm  Pulses:           Radial pulses are 2+ on the right side and 2+ on the left side  Dorsalis pedis pulses are 2+ on the right side and 2+ on the left side  Heart sounds: S1 normal and S2 normal  No murmur heard  Pulmonary:      Effort: No respiratory distress  Breath sounds: Normal breath sounds  No wheezing or rales  Abdominal:      General: There is no distension  Palpations: Abdomen is soft  Tenderness: There is no abdominal tenderness  Musculoskeletal:         General: Normal range of motion  Cervical back: Normal range of motion  Right lower leg: No edema  Left lower leg: No edema  Skin:     General: Skin is warm and dry  Capillary Refill: Capillary refill takes less than 2 seconds  Findings: No erythema  Neurological:      General: No focal deficit present  Mental Status: He is alert and oriented to person, place, and time     Psychiatric:         Mood and Affect: Mood normal          Behavior: Behavior normal

## 2021-07-13 ENCOUNTER — OFFICE VISIT (OUTPATIENT)
Dept: CARDIOLOGY CLINIC | Facility: HOSPITAL | Age: 51
End: 2021-07-13
Payer: COMMERCIAL

## 2021-07-13 VITALS
WEIGHT: 297 LBS | BODY MASS INDEX: 42.52 KG/M2 | HEART RATE: 72 BPM | DIASTOLIC BLOOD PRESSURE: 86 MMHG | SYSTOLIC BLOOD PRESSURE: 126 MMHG | HEIGHT: 70 IN

## 2021-07-13 DIAGNOSIS — I21.02 STEMI INVOLVING LEFT ANTERIOR DESCENDING CORONARY ARTERY (HCC): ICD-10-CM

## 2021-07-13 DIAGNOSIS — I25.10 CORONARY ARTERY DISEASE INVOLVING NATIVE CORONARY ARTERY OF NATIVE HEART WITHOUT ANGINA PECTORIS: Primary | ICD-10-CM

## 2021-07-13 DIAGNOSIS — Z51.81 ENCOUNTER FOR MONITORING ACE-INHIBITOR THERAPY: ICD-10-CM

## 2021-07-13 DIAGNOSIS — Z79.899 ENCOUNTER FOR MONITORING ACE-INHIBITOR THERAPY: ICD-10-CM

## 2021-07-13 PROCEDURE — 99214 OFFICE O/P EST MOD 30 MIN: CPT | Performed by: INTERNAL MEDICINE

## 2021-07-13 RX ORDER — NITROGLYCERIN 0.4 MG/1
0.4 TABLET SUBLINGUAL
Qty: 30 TABLET | Refills: 0 | Status: SHIPPED | OUTPATIENT
Start: 2021-07-13 | End: 2021-12-23 | Stop reason: SDUPTHER

## 2021-07-27 ENCOUNTER — CLINICAL SUPPORT (OUTPATIENT)
Dept: CARDIAC REHAB | Facility: HOSPITAL | Age: 51
End: 2021-07-27
Payer: COMMERCIAL

## 2021-07-27 DIAGNOSIS — I21.02 ACUTE ST ELEVATION MYOCARDIAL INFARCTION (STEMI) DUE TO OCCLUSION OF MID PORTION OF LEFT ANTERIOR DESCENDING (LAD) CORONARY ARTERY (HCC): ICD-10-CM

## 2021-07-27 DIAGNOSIS — I21.9 TYPE 1 MYOCARDIAL INFARCTION (HCC): ICD-10-CM

## 2021-07-27 DIAGNOSIS — I21.3 ST ELEVATION MYOCARDIAL INFARCTION (STEMI), UNSPECIFIED ARTERY (HCC): Primary | ICD-10-CM

## 2021-07-27 PROCEDURE — 93797 PHYS/QHP OP CAR RHAB WO ECG: CPT

## 2021-07-27 NOTE — PROGRESS NOTES
Cardiac Rehabilitation Plan of Care   Initial Care Plan          Today's date: 2021   # of Exercise Sessions Completed:   Patient name: Chapin Pretty      : 1970  Age: 52 y o  MRN: [id number]  Referring Physician: Jesús Altamirano MD  Cardiologist: Dr Constantino Mtz MD  Provider: [facility]  Clinician: Ailyn Garcia, MPT, CCRP    Dx: S/P ALVARADO to LAD  Encounter Diagnoses   Name Primary?  ST elevation myocardial infarction (STEMI), unspecified artery (HCC)     Acute ST elevation myocardial infarction (STEMI) due to occlusion of mid portion of left anterior descending (LAD) coronary artery (HCC)     Type 1 myocardial infarction (HCC)    LVEF 60%      Date of onset: 2021      SUMMARY OF PROGRESS: Today is the patient's initial visit at Cardiac Rehab  Patient admits to 100% medication compliance  Patient reports the following physical limitations: extreme fatigue and JOHNSON  The patient completed an initial Submaximal TM ETT for a total of 9 minutes and reached a 4 3 MET level  Patient does not require supplemental O2 at this time  Patient's PHQ9 Score was two  At this time, patient admits to having depression  Patient currently going through a divorce and child custody issues  Patient's GAD7 Score was one  At this time, patient denies having anxiety  Currently, the patient does not follow a formal exercise program at home  He states he does have a TM and would like guidance from CR regarding a HEP  Patient was counseled on exercise guidelines including 1-2 days of moderate exercise on opposite days of Cardiac Rehab, as tolerated  Patient was issued the [facility] Cardiopulmonary Guide this session  At rest, the patient's HR was 74, O2Sat was 97%, and BP was 130/68  During exercise, the patient's peak HR was 107, while peak BP was 142/72  During the test he was able to maintain an 02 sat of 97%    Patient rated the test a 5 on a 1-10 RPE Scale    During recovery, the patient's HR was 80, O2Sat was 100%, and BP was 120/58  Patient denied having any other symptoms during exercise  Additionally, the patient's telemetry revealed NSR  Patient had correct hemodynamic responses to activity  Patient has goals of decrease fatigue, decrease JOHNSON, improve strength, initiate a HEP, decrease stress level, smoking cessation and return to work full time/duty  Patient is agreeable to coming to Cardiac Rehab 3X times/week until he returns to work  Tentative return to work date is          Medication compliance: Yes   Comments: Pt reports to be compliant with medications  Fall Risk: Low   Comments: Ambulates with a steady gait with no assist device    EKG Interpretation: Telemetry reveals NSR      EXERCISE ASSESSMENT and PLAN    Current Exercise Program in Rehab:       Frequency: 3 days/week Supplement with home exercise 2+ days/wk as tolerated       Minutes: 35-40         METS: 3 5 to 4           HR: 20-30 > RHR    RPE: 4-6         Modalities: Treadmill, UBE, NuStep and Recumbent bike      Exercise Progression 30 Day Goals :    Frequency: 3 days/week of cardiac rehab     Supplement with home exercise 2+ days/wk as tolerated    Minutes: 40-45                           >150 mins/wk of moderate intensity exercise   METS: 4 to 4 5   HR:    RPE: 4-6   Modalities: Treadmill, UBE, NuStep and Recumbent bike    Strength trainin-3 days / week  12-15 repetitions  1-2 sets per modality    Modalities: Leg Press and UE Free weights    Home Exercise: none    Goals: 10% improvement in functional capacity - based on max METs achieved in fitness assessment, Reduced dyspnea with physical activity  0-1/10, improved DASI score by 10%, Increase in exercise capacity by 40% - based on peak METs tolerated in cardiac rehab exercise session, Exercise 5 days/wk, >150mins/wk of moderate intensity exercise, Resume ADLs with increased strength, Return to work unrestricted and Attend Rehab regularly    Progression Toward Goals:  Reviewed Pt goals and determined plan of care    Education: benefit of exercise for CAD risk factors, home exercise guidelines, AHA guidelines to achieve >150 mins/wk of moderate exercise and RPE scale   Plan:education on home exercise guidelines, home exercise 30+ mins 2 days opposite CR and Education class: Risk Factors for Heart Disease  Readiness to change: Preparation:  (Getting ready to change)       NUTRITION ASSESSMENT AND PLAN    Weight control: Patient states prior to event he was > 300#   Starting weight: 288   Current weight:  288   Waist circumference:    Starting:NA   Current:  NA    Diabetes: N/A  A1c: 5 4    last measured: 6/29/2021    Lipid management: Discussed diet and lipid management    Goals:LDL <100, HDL >40, TRG <150 and CHOL <200    Progression Toward Goals: Reviewed Pt goals and determined plan of care    Education: heart healthy eating  low sodium diet  hydration  nutrition for  lipid management  wt  loss   healthy choices while dining out  portion control  Plan: Education class: Reading Food Labels, Education Class: Heart Healthy Eating, replace butter with soft spreads made with olive oil, canola or yogurt, replace refined grain bread with whole grain bread, replace unhealthy snacks with fruits & vegs, eat fewer desserts and sweets and avoid processed foods  Readiness to change: Preparation:  (Getting ready to change)       PSYCHOSOCIAL ASSESSMENT AND PLAN    Emotional:  Depression assessment:  PHQ-9 = 1-4 = Minimal Depression            Anxiety measure:  TESS-7 = 0-4  = Not anxious  Self-reported stress level:  10/10 - Currently going through a divorce and custody jeong, relocation and loss of health insurance  Social support: Good     Goals:  Reduce perceived stress to 1-3/10, improved [facility] QOL < 27, PHQ-9 - reduced severity by one level, Physical Fitness in [facility] Score < 3, Social Support in [place] Score < 3, Daily Activity in [facility] Score < 3, Overall Health in [facility] Score < 3, Quality of Life in [facility] Score < 3  and Change in Health in [facility] Score < 3     Progression Toward Goals: Reviewed Pt goals and determined plan of care    Education: signs/sxs of depression, benefits of a positive support system, stress management techniques and depression and CAD  Plan: Class: Stress and Your Health, Class: Relaxation, Practice relaxation techniques and Exercise  Readiness to change: Preparation:  (Getting ready to change)       OTHER CORE COMPONENTS     Tobacco:   Social History     Tobacco Use   Smoking Status Former Smoker    Packs/day: 2 00    Years: 22 00    Pack years: 44 00    Quit date: 2021    Years since quittin 0   Smokeless Tobacco Never Used       Tobacco Use Intervention:   Brief counseling by cardiac rehab professional  Date: 2021  Patient has stppoed smoking cigarettes and is now vaping  Patient to be issued a smoking cessation referral     Anginal Symptoms:  chest pressure, SOB and LUE pain at time of the event  NTG use: Patient was just prescribed Nitro at his last MD visit  He has not used any at this time      Blood pressure:    Restin/68   Exercise: 142/72   Recovery: 120/58    Goals: consistent BP < 130/80, reduced dietary sodium <2300mg, moderate intensity exercise >150 mins/wk, medication compliance, reduce number of cigarettes/day, set a target quit date and Abstain from smoking    Progression Toward Goals: Reviewed Pt goals and determined plan of care    Education:  understanding high blood pressure and it's relationship to CAD, low sodium diet and HTN, proper use of sublingual NTG, Education class:  Common Heart Medications, Education class: Understanding Heart Disease, smoking and heart disease and tobacco triggers  Plan: Class: Understanding Heart Disease, Class: Common Heart Medications, Set target quit date for smoking, reduce number of cigarettes per day, Complete abstention from smoking, avoid places with second hand smoke, Avoid Processed foods, engage in regular exercise, use salt substitutes, check labels for sodium content and monitor home BP  Readiness to change: Preparation:  (Getting ready to change)             CARDIAC REHAB ASSESSMENT    Today's date: 2021  Patient name: Jesenia Betancourt     : 1970       MRN: [id number]  PCP: Reesa Leyden, MD  Referring Physician: Britni Izaguirre MD  Cardiologist: Dr Letha Edwards MD  Surgeon: Dr Issac Rios MD  Dx:   Encounter Diagnoses   Name Primary?  ST elevation myocardial infarction (STEMI), unspecified artery (HCC)     Acute ST elevation myocardial infarction (STEMI) due to occlusion of mid portion of left anterior descending (LAD) coronary artery ([facility])     Type 1 myocardial infarction [facility])        Date of onset: 2021  Cultural needs: None    Height:    Wt Readings from Last 1 Encounters:   21 135 kg (297 lb)      Weight:   Ht Readings from Last 1 Encounters:   21 5' 10" (1 778 m)     Medical History:   Past Medical History:   Diagnosis Date    Heart attack ([facility] [address] ) 2021         Physical Limitations: None    Fall Risk: Low   Comments: Ambulates with a steady gait with no assist device    Anginal Equivalent: Chest Pressure   NTG use: Patient just received a RX for Nitro   Having it filled today,    Risk Factors   Cholesterol: Yes  Smoking: Current user:  average ppd:  1 ppd  HTN: Yes  DM: No  Obesity: Yes   Inactivity: No  Stress:  perceived  stress: 10/10   Stressors:Currently going through a divorce and custody jeong, relocation and loss of health insurance     Goals for Stress Management:Practice Relaxation Techniques, Exercise, Improve Time Management, Maintain a stress diary, Keep a positive mindset and Spend time outside    Family History:  Family History   Problem Relation Age of Onset    Hypertension Mother     Breast cancer Mother     Heart attack Father     Hyperlipidemia Father     Hyperlipidemia Sister     Heart disease Maternal Grandmother     Heart disease Maternal Grandfather     Heart attack Maternal Grandfather        Allergies: Patient has no known allergies  ETOH:   Social History     Substance and Sexual Activity   Alcohol Use Not Currently         Current Medications:   Current Outpatient Medications   Medication Sig Dispense Refill    aspirin (ECOTRIN LOW STRENGTH) 81 mg EC tablet Take 1 tablet (81 mg total) by mouth daily 90 tablet 3    atorvastatin (LIPITOR) 40 mg tablet Take 1 tablet (40 mg total) by mouth daily with dinner 90 tablet 3    clopidogrel (PLAVIX) 75 mg tablet Take 1 tablet (75 mg total) by mouth daily 90 tablet 3    lisinopril (ZESTRIL) 5 mg tablet Take 1 tablet (5 mg total) by mouth daily 90 tablet 3    metoprolol succinate (TOPROL-XL) 50 mg 24 hr tablet Take 1 tablet (50 mg total) by mouth daily 90 tablet 3    nitroglycerin (NITROSTAT) 0 4 mg SL tablet Place 1 tablet (0 4 mg total) under the tongue every 5 (five) minutes as needed for chest pain 30 tablet 0     No current facility-administered medications for this visit  Functional Status Prior to Diagnosis for Treatment   Occupation: full time job - [employer]  Recreation: Activities w/his son  ADLs: No limitations  Ina: No limitations  Exercise: TM and walking program  Other: History of exposures due to employment    Current Functional Status  Occupation: plans to return to work - tentative return to work date is August 9th  Recreation: as above  ADLs:resumed all ADLs able to perform self-care resumed driving  Ina: Capable of performing light to moderate ADLs  Exercise: Agreeable to attend CR until return to work  Other:     Patient Specific Goals:   Patient has goals of decrease fatigue, decrease JOHNSON, improve strength, initiate a HEP, decrease stress level, smoking cessation and return to work full time/duty         Short Term Program Goals: dietary modifications increased strength improved energy/stamina with ADLs exercise 120-150 mins/wk    Long Term Goals: increased maximal walking duration  increased intial training workload  Improved [facility] Activity Status score  Improved functional capacity  Improved Quality of Life - [facility] score reduced  Improved lipid profile  Abstain from smoking  Reduced stress    Ability to reach goals/rehabilitation potential:  Excellent    Projected return to function: 12 weeks  Objective tests: sub-max TM ETT      Nutritional   Reviewed details of Rate your Plate  Discussed key elements of heart healthy eating  Reviewed patient goals for dietary modifications and their clinical implications  Reviewed most recent lipid profile  Goals for dietary modification: choose lean cuts of meat  poultry without the skin  low fat ground meat and poultry  eliminate processed meats  reduce portions of meat to 3 oz  increase fish intake  more meatless meals  increase whole grains  increase fruits and vegetables  eliminate butter  low sodium      Emotional/Social  Patient reports he/she is coping well with good social support and denies depression or anxiety    Marital status:     Domestic Violence Screening: No    Comments: Patient presents w/JOHNSON and extreme fatigue following a cardiac event  He is under much stress due to a pending divorce, custody jeong, loss of insurance and relocation  Patient is also fearful of job loss if he does not return to work soon  Patient requires skilled CR interventions in order to attain his goals and maximal level of function  CR is medically necessary for secondary prevention  Patient is very motivated to progress

## 2021-07-28 ENCOUNTER — CLINICAL SUPPORT (OUTPATIENT)
Dept: CARDIAC REHAB | Facility: HOSPITAL | Age: 51
End: 2021-07-28
Payer: COMMERCIAL

## 2021-07-28 DIAGNOSIS — I21.3 ST ELEVATION MYOCARDIAL INFARCTION (STEMI), UNSPECIFIED ARTERY (HCC): ICD-10-CM

## 2021-07-28 PROCEDURE — 93798 PHYS/QHP OP CAR RHAB W/ECG: CPT

## 2021-07-30 ENCOUNTER — CLINICAL SUPPORT (OUTPATIENT)
Dept: CARDIAC REHAB | Facility: HOSPITAL | Age: 51
End: 2021-07-30
Payer: COMMERCIAL

## 2021-07-30 DIAGNOSIS — I21.3 ST ELEVATION MYOCARDIAL INFARCTION (STEMI), UNSPECIFIED ARTERY (HCC): ICD-10-CM

## 2021-07-30 PROCEDURE — 93798 PHYS/QHP OP CAR RHAB W/ECG: CPT

## 2021-08-02 ENCOUNTER — CLINICAL SUPPORT (OUTPATIENT)
Dept: CARDIAC REHAB | Facility: HOSPITAL | Age: 51
End: 2021-08-02
Payer: COMMERCIAL

## 2021-08-02 DIAGNOSIS — I21.3 ST ELEVATION MYOCARDIAL INFARCTION (STEMI), UNSPECIFIED ARTERY (HCC): ICD-10-CM

## 2021-08-02 PROCEDURE — 93798 PHYS/QHP OP CAR RHAB W/ECG: CPT

## 2021-08-04 ENCOUNTER — CLINICAL SUPPORT (OUTPATIENT)
Dept: CARDIAC REHAB | Facility: HOSPITAL | Age: 51
End: 2021-08-04
Payer: COMMERCIAL

## 2021-08-04 DIAGNOSIS — I21.02 ACUTE ST ELEVATION MYOCARDIAL INFARCTION (STEMI) DUE TO OCCLUSION OF MID PORTION OF LEFT ANTERIOR DESCENDING (LAD) CORONARY ARTERY (HCC): ICD-10-CM

## 2021-08-04 PROCEDURE — 93798 PHYS/QHP OP CAR RHAB W/ECG: CPT

## 2021-08-06 ENCOUNTER — CLINICAL SUPPORT (OUTPATIENT)
Dept: CARDIAC REHAB | Facility: HOSPITAL | Age: 51
End: 2021-08-06
Payer: COMMERCIAL

## 2021-08-06 DIAGNOSIS — I21.02 ACUTE ST ELEVATION MYOCARDIAL INFARCTION (STEMI) DUE TO OCCLUSION OF MID PORTION OF LEFT ANTERIOR DESCENDING (LAD) CORONARY ARTERY (HCC): ICD-10-CM

## 2021-08-06 PROCEDURE — 93798 PHYS/QHP OP CAR RHAB W/ECG: CPT

## 2021-08-06 NOTE — PROGRESS NOTES
Cardiac Rehabilitation Plan of Care   Discharge          Today's date: 2021   # of Exercise Sessions Completed: 6  Patient name: Gela Gao      : 1970  Age: 52 y o  MRN: [id number]  Referring Physician: Adela Shepard MD  Cardiologist: Dane Dubois MD  Provider: [facility]  Clinician: Rachel Lara RN    Dx: s/p ALVARADO to LAD/ STEMI    LVEF 60%  Date of onset:  21      SUMMARY OF PROGRESS:  Ace Halsted  Is returning to work as a  on 21  He did 40 min of aerobic exercise   He had a maximin heart rate of 105  He is in NSR-sinus tachycardia  He has been asymptomatic   He did receive education on Understanding Heart disease,risk factor reduction,food labels and stress and your health  Medication compliance: Yes   Comments: Pt reports to be compliant with medications  Fall Risk: Low   Comments: Ambulates with a steady gait with no assist device    EKG Interpretation: NSR -sinus tachycardia      EXERCISE ASSESSMENT and PLAN    Current Exercise Program in Rehab:       Frequency: 3 days/week Supplement with home exercise 2+ days/wk as tolerated       Minutes: 40         METS:  2 8            HR:    RPE: 4-6         Modalities: Treadmill, UBE and NuStep            Home Exercise: walking    Goals: Return to work unrestricted, return to regular exercise at the gym and start a home exercise program    Progression Toward Goals:  Patient will be encouraged to focus on lifestyle modifications following discharge      Education: benefit of exercise for CAD risk factors and AHA guidelines to achieve >150 mins/wk of moderate exercise   Plan:education on home exercise guidelines  Readiness to change: Action:  (Changing behavior)      NUTRITION ASSESSMENT AND PLAN    Weight control:    Starting weight: 288   Current weight:   288  Waist circumference:    Starting:    Current:      Diabetes: N/A  A1c: 5 4    last measured: 21    Lipid management: Discussed diet and lipid management    Goals:LDL <100, HDL >40, TRG <150 and CHOL <200    Progression Toward Goals: Patient will be encouraged to focus on lifestyle modifications following discharge  Education: heart healthy eating  low sodium diet  Plan: replace butter with soft spreads made with olive oil, canola or yogurt, replace refined grain bread with whole grain bread, replace unhealthy snacks with fruits & vegs, reduce portion sizes and reduce red meat 1x/wk  Readiness to change: Action:  (Changing behavior)      PSYCHOSOCIAL ASSESSMENT AND PLAN    Emotional:  Depression assessment:  PHQ-9 = 1-4 = Minimal Depression            Anxiety measure:  TESS-7 = 0-4  = Not anxious  Self-reported stress level:  10  Social support: Good     Goals:  take time to relax and Feel less anxious    Progression Toward Goals: Patient will be encouraged to focus on lifestyle modifications following discharge  Education: signs/sxs of depression and benefits of a positive support system  Plan: Practice relaxation techniques  Readiness to change: Action:  (Changing behavior)      OTHER CORE COMPONENTS     Tobacco:   Social History     Tobacco Use   Smoking Status Former Smoker    Packs/day: 2 00    Years: 22 00    Pack years: 44 00    Quit date: 2021    Years since quittin 1   Smokeless Tobacco Never Used       Tobacco Use Intervention:   N/A: Pt has a remote history of smoking    Anginal Symptoms:  None   NTG use: Understands proper use    Blood pressure:    Restin/80   Exercise: 140/80               Recovery 118/70  Goals: consistent BP < 130/80 and Abstain from smoking    Progression Toward Goals: Patient will be encouraged to focus on lifestyle modifications following discharge      Education:  understanding high blood pressure and it's relationship to CAD, proper use of sublingual NTG, smoking and heart disease and tobacco triggers  Plan: Avoid Processed foods, engage in regular exercise, check labels for sodium content and monitor home BP  Readiness to change: Action:  (Changing behavior)

## 2021-08-09 ENCOUNTER — APPOINTMENT (OUTPATIENT)
Dept: CARDIAC REHAB | Facility: HOSPITAL | Age: 51
End: 2021-08-09
Payer: COMMERCIAL

## 2021-08-11 ENCOUNTER — APPOINTMENT (OUTPATIENT)
Dept: CARDIAC REHAB | Facility: HOSPITAL | Age: 51
End: 2021-08-11
Payer: COMMERCIAL

## 2021-08-13 ENCOUNTER — APPOINTMENT (OUTPATIENT)
Dept: CARDIAC REHAB | Facility: HOSPITAL | Age: 51
End: 2021-08-13
Payer: COMMERCIAL

## 2021-08-16 ENCOUNTER — APPOINTMENT (OUTPATIENT)
Dept: CARDIAC REHAB | Facility: HOSPITAL | Age: 51
End: 2021-08-16
Payer: COMMERCIAL

## 2021-08-18 ENCOUNTER — APPOINTMENT (OUTPATIENT)
Dept: CARDIAC REHAB | Facility: HOSPITAL | Age: 51
End: 2021-08-18
Payer: COMMERCIAL

## 2021-08-19 ENCOUNTER — OFFICE VISIT (OUTPATIENT)
Dept: CARDIOLOGY CLINIC | Facility: HOSPITAL | Age: 51
End: 2021-08-19
Payer: COMMERCIAL

## 2021-08-19 ENCOUNTER — TELEMEDICINE (OUTPATIENT)
Dept: PULMONOLOGY | Facility: CLINIC | Age: 51
End: 2021-08-19
Payer: COMMERCIAL

## 2021-08-19 VITALS
HEIGHT: 70 IN | BODY MASS INDEX: 41.95 KG/M2 | WEIGHT: 293 LBS | SYSTOLIC BLOOD PRESSURE: 140 MMHG | HEART RATE: 80 BPM | DIASTOLIC BLOOD PRESSURE: 82 MMHG

## 2021-08-19 VITALS — BODY MASS INDEX: 41.95 KG/M2 | WEIGHT: 293 LBS | HEIGHT: 70 IN

## 2021-08-19 DIAGNOSIS — I25.10 CORONARY ARTERY DISEASE INVOLVING NATIVE HEART WITHOUT ANGINA PECTORIS, UNSPECIFIED VESSEL OR LESION TYPE: Primary | ICD-10-CM

## 2021-08-19 DIAGNOSIS — E78.5 DYSLIPIDEMIA: ICD-10-CM

## 2021-08-19 DIAGNOSIS — Z72.0 TOBACCO USE: ICD-10-CM

## 2021-08-19 DIAGNOSIS — Z72.0 TOBACCO ABUSE DISORDER: Primary | ICD-10-CM

## 2021-08-19 DIAGNOSIS — I21.02 ACUTE ST ELEVATION MYOCARDIAL INFARCTION (STEMI) DUE TO OCCLUSION OF MID PORTION OF LEFT ANTERIOR DESCENDING (LAD) CORONARY ARTERY (HCC): ICD-10-CM

## 2021-08-19 PROCEDURE — 99214 OFFICE O/P EST MOD 30 MIN: CPT | Performed by: INTERNAL MEDICINE

## 2021-08-19 PROCEDURE — 99214 OFFICE O/P EST MOD 30 MIN: CPT | Performed by: PHYSICIAN ASSISTANT

## 2021-08-19 RX ORDER — NEBIVOLOL 5 MG/1
5 TABLET ORAL DAILY
Qty: 30 TABLET | Refills: 6 | Status: SHIPPED | OUTPATIENT
Start: 2021-08-19 | End: 2021-08-31 | Stop reason: ALTCHOICE

## 2021-08-19 RX ORDER — NICOTINE 21 MG/24HR
1 PATCH, TRANSDERMAL 24 HOURS TRANSDERMAL EVERY 24 HOURS
Qty: 28 PATCH | Refills: 0 | Status: SHIPPED | OUTPATIENT
Start: 2021-08-19 | End: 2022-06-12 | Stop reason: ALTCHOICE

## 2021-08-19 NOTE — PROGRESS NOTES
Virtual Visit with Tobacco Cessation    Verification of patient location:    Patient is located in the following state in which I hold an active license [place]    Problem List Items Addressed This Visit     None      Visit Diagnoses     Tobacco abuse disorder    -  Primary    Relevant Medications    nicotine (NICODERM CQ) 21 mg/24 hr TD 24 hr patch          Reason for visit is smoking cessation      Encounter provider Melquiades Lott PA-C    Provider located at [address] [facility]  [facility]  [facility]  [address] [facility] [address]  [place] [address] [facility]  [phone]      Recent Visits  No visits were found meeting these conditions  Showing recent visits within past 7 days and meeting all other requirements  Today's Visits  Date Type Provider Dept   08/19/21 Telemedicine [facility]ca Shrestha   Showing today's visits and meeting all other requirements  Future Appointments  No visits were found meeting these conditions  Showing future appointments within next 150 days and meeting all other requirements       The patient was identified by name and date of birth  Cheng Wall was informed that this is a telemedicine visit and that the visit is being conducted throughtelephone  My office door was closed  No one else was in the room  He acknowledged consent and understanding of privacy and security of the platform  Cheng Wall verbally agrees to participate in [employer]  Pt is aware that [employer] could be limited without vital signs or the ability to perform a full hands-on physical exam  Jn Man understands he or the provider may request at any time to terminate the video visit and request the patient to seek care or treatment in person  Patient is aware this is a billable service  Subjective  Cheng Wall is a 52 y o  male is called today for a virtual smoking cessation consultation    Patient is a 57-year-old male who had a heart attack on 2021 and was in the hospital   He reports that he stopped smoking cigarettes the day he had his heart attack  Up until that he was smoking 2 packs per day  He reports that he is currently using the Jewel cigarette at 3%  He reports that his heaviest he was smoking 2 packs per day  He started smoking when he was 27years old  He did report they smoked about 1 pack per day until he was 29  He then quit at the age of 29 and remained smoke-free in till the age of 47 when he began smoking again as he was  Pressured at a night of drinking with his friends  He reports that his largest barrier of quitting smoking right now is this is become a daily habit for him  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 at which time he smokes cigarettes to past the time  He is also under an immense amount of stress as he is going through a divorce, custody jeong, selling a house, and buying a new house  He reports that his largest issue/difficulty with quitting smoking is the routine and helps manage his stress  We did discuss the risks of vaping including vaping induced lung injury  He had tried Wellbutrin and Chantix in the past without success and had significant side effects from both medication    He is interested in using nicotine patch in attempts to quit smoking and will follow-up in 1 month  Mikayla TOSCANO     See above    Past Medical History:   Diagnosis Date    Heart attack ([facility] [address] ) 2021       Past Surgical History:   Procedure Laterality Date    CORONARY ANGIOPLASTY      FINGER FRACTURE SURGERY      MOUTH SURGERY         Social History     Tobacco Use   Smoking Status Former Smoker    Packs/day: 2 00    Years: 22 00    Pack years: 44 00    Quit date: 2021    Years since quittin 1   Smokeless Tobacco Never Used       E-Cigarette/Vaping    E-Cigarette Use Current Every Day User      E-Cigarette/Vaping Substances    Nicotine Yes  THC No     CBD No     Flavoring No     Other No     Unknown No        Current Outpatient Medications   Medication Sig Dispense Refill    aspirin (ECOTRIN LOW STRENGTH) 81 mg EC tablet Take 1 tablet (81 mg total) by mouth daily 90 tablet 3    atorvastatin (LIPITOR) 40 mg tablet Take 1 tablet (40 mg total) by mouth daily with dinner 90 tablet 3    B Complex Vitamins (VITAMIN B COMPLEX PO) Take by mouth      Cholecalciferol 25 MCG (1000 UT) tablet Take 1,000 Units by mouth      clopidogrel (PLAVIX) 75 mg tablet Take 1 tablet (75 mg total) by mouth daily 90 tablet 3    lisinopril (ZESTRIL) 5 mg tablet Take 1 tablet (5 mg total) by mouth daily 90 tablet 3    Multiple Vitamin (MULTIVITAMIN ADULT PO) Take 1 tablet by mouth      nebivolol (BYSTOLIC) 5 mg tablet Take 1 tablet (5 mg total) by mouth daily 30 tablet 6    nitroglycerin (NITROSTAT) 0 4 mg SL tablet Place 1 tablet (0 4 mg total) under the tongue every 5 (five) minutes as needed for chest pain 30 tablet 0    OMEGA-3 FATTY ACIDS PO Take 500 mg by mouth      TURMERIC PO Take 500 mg by mouth      nicotine (NICODERM CQ) 21 mg/24 hr TD 24 hr patch Place 1 patch on the skin every 24 hours 28 patch 0     No current facility-administered medications for this visit  Allergies   Allergen Reactions    Bupropion Itching       Review of Systems   Respiratory: Negative for apnea, cough, choking, chest tightness, shortness of breath, wheezing and stridor  All other systems reviewed and are negative  Video Exam    Vitals:    08/19/21 1357   Weight: 133 kg (293 lb)   Height: 5' 10" (1 778 m)       Physical Exam     Is a virtual visit    Tobacco Use/Cessation  I assessed Ruben Alas to be in a contemplative stage with respect to tobacco use  I advised patient to quit, and offered support  Discussed current use pattern  Reason for relapse was Peer pressure  Reviewed strategies for quitting again, and dealing with triggers    Nicotine nadeem Alas is a 52 y o  male here for a discussion regarding smoking cessation  He began smoking 30 years ago  He currently smokes 2 packs per day  He has attempted to quit smoking in the past, most recently 8 Years ago  Best success quitting using willpower  Barriers to quitting include: friends smoke, spouse/partner smokes and under a lot of stress now  He denies chest pain, dyspnea on exertion, dyspnea while laying down, hemoptysis, non productive cough, productive cough, shortness of breath and wheezing  As a result of this visit, I have referred the patient for further respiratory evaluation   No    I spent 20 minutes with patient today in which >10 minutes of the time was spent in counseling/coordination of care regarding tobacco cessation

## 2021-08-19 NOTE — PROGRESS NOTES
Cardiology Follow Up    Holly Giordano  1970  [id number]  [facility] [address]  [place] [address]  [phone]    No diagnosis found  There are no diagnoses linked to this encounter  I had the pleasure of seeing Holly Giordano for a follow up visit  INTERVAL HISTORY: none, see below    History of the presenting illness, Discussion/Summary and My Plan are as follows:::    He is a pleasant 24-year-old gentleman with a history of tobacco use, mild hyperlipidemia based on a single lipid profile at the time of his MI, presented with chest pains with ST elevations in V3-V4, transferred for urgent coronary angiography, which showed single-vessel disease in the mid LAD-status post PCI, likely diffuse disease in the other vessels based on their small caliber  This occurred in the setting of an undue amount of stress-going through a divorce in its court proceedings  He has a 10year-old son that he has custody of  He did not have insurance and hence Brilinta was changed to Plavix for cost   He has top cigarettes but is vaping nicotine and is trying to quit completely  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  He underwent cardiac rehabilitation for a short period prior to being able to go back to work  He denies any chest pains suggestive of angina or similar to his recent MI pains, but does get short of breath especially when it is too humid-sometimes even in the hot shower  Plan:    Coronary artery disease:  Recent LAD stenting in the setting of anterior STEMI, overall LV function was preserved on echocardiography  Continue aspirin, Plavix, statin  For erectile dysfunction and decreased libido, will try to switch metoprolol to Bystolic    Erectile dysfunction and decreased libido:  See above for switch, if systolic is expensive, will add Viagra      Dyslipidemia:  Mildly elevated at the time of his heart attack, no prior levels are available, will check another lipid profile in about 2 months  Shortness of breath:  Never with exertion, he does have anxiety and stress and in the past had been on medications  Will check an exercise treadmill stress test     Increased stress/anxiety:  I told him to seek counseling, he had in the past followed with a counselor for over 15 years and had been on medications  Follow-up in about 3 months      Results for Razia Padilla (MRN [id number]) as of 2021 11:05   Ref   Range 2021 23:05 2021 15:02   Cholesterol Latest Ref Range: 50 - 200 mg/dL 168    Triglycerides Latest Ref Range: <=150 mg/dL 334 (H)    HDL Latest Ref Range: >=40 mg/dL 30 (L)    LDL Calculated Latest Ref Range: 0 - 100 mg/dL 71    LDL CHOLESTEROL DIRECT Latest Ref Range: 0 - 100 mg/dl  124 (H)       Patient Active Problem List   Diagnosis    Type 1 myocardial infarction (HCC)    Acute ST elevation myocardial infarction (STEMI) due to occlusion of mid portion of left anterior descending (LAD) coronary artery [facility])     Past Medical History:   Diagnosis Date    Heart attack ([facility] [address] ) 2021     Social History     Socioeconomic History    Marital status: Single     Spouse name: Not on file    Number of children: Not on file    Years of education: Not on file    Highest education level: Not on file   Occupational History    Not on file   Tobacco Use    Smoking status: Former Smoker     Packs/day: 2 00     Years: 22 00     Pack years: 44 00     Quit date: 2021     Years since quittin 1    Smokeless tobacco: Never Used   Vaping Use    Vaping Use: Every day    Substances: Nicotine   Substance and Sexual Activity    Alcohol use: Not Currently    Drug use: Not Currently     Types: Marijuana     Comment: used once last week    Sexual activity: Not on file   Other Topics Concern    Not on file   Social History Narrative    Not on file     Social Determinants of Health     Financial Resource Strain:     Difficulty of Paying Living Expenses:    Food Insecurity:     Worried About Running Out of Food in the Last Year:     [address] in the Last Year:    Transportation Needs:     Lack of Transportation (Medical):      Lack of Transportation (Non-Medical):    Physical Activity:     Days of Exercise per Week:     Minutes of Exercise per Session:    Stress:     Feeling of Stress :    Social Connections:     Frequency of Communication with Friends and Family:     Frequency of Social Gatherings with Friends and Family:     Attends Gn[employer] Services:     Active Member of Clubs or Organizations:     Attends Club or Organization Meetings:     Marital Status:    Intimate Partner Violence:     Fear of Current or Ex-Partner:     Emotionally Abused:     Physically Abused:     Sexually Abused:       Family History   Problem Relation Age of Onset    Hypertension Mother     Breast cancer Mother     Heart attack Father     Hyperlipidemia Father     Hyperlipidemia Sister     Heart disease Maternal Grandmother     Heart disease Maternal Grandfather     Heart attack Maternal Grandfather      Past Surgical History:   Procedure Laterality Date    CORONARY ANGIOPLASTY      FINGER FRACTURE SURGERY      MOUTH SURGERY         Current Outpatient Medications:     aspirin (ECOTRIN LOW STRENGTH) 81 mg EC tablet, Take 1 tablet (81 mg total) by mouth daily, Disp: 90 tablet, Rfl: 3    atorvastatin (LIPITOR) 40 mg tablet, Take 1 tablet (40 mg total) by mouth daily with dinner, Disp: 90 tablet, Rfl: 3    B Complex Vitamins (VITAMIN B COMPLEX PO), Take by mouth, Disp: , Rfl:     Cholecalciferol 25 MCG (1000 UT) tablet, Take 1,000 Units by mouth, Disp: , Rfl:     clopidogrel (PLAVIX) 75 mg tablet, Take 1 tablet (75 mg total) by mouth daily, Disp: 90 tablet, Rfl: 3    lisinopril (ZESTRIL) 5 mg tablet, Take 1 tablet (5 mg total) by mouth daily, Disp: 90 tablet, Rfl: 3    metoprolol succinate (TOPROL-XL) 50 mg 24 hr tablet, Take 1 tablet (50 mg total) by mouth daily, Disp: 90 tablet, Rfl: 3    Multiple Vitamin (MULTIVITAMIN ADULT PO), Take 1 tablet by mouth, Disp: , Rfl:     nitroglycerin (NITROSTAT) 0 4 mg SL tablet, Place 1 tablet (0 4 mg total) under the tongue every 5 (five) minutes as needed for chest pain, Disp: 30 tablet, Rfl: 0    OMEGA-3 FATTY ACIDS PO, Take 500 mg by mouth, Disp: , Rfl:     TURMERIC PO, Take 500 mg by mouth, Disp: , Rfl:   Allergies   Allergen Reactions    Bupropion Itching       Imaging: No results found  Review of Systems:  Review of Systems   Constitutional: Negative  HENT: Negative  Eyes: Negative  Respiratory: Negative  Cardiovascular: Negative  Endocrine: Negative  Musculoskeletal: Negative  Physical Exam:  /82   Pulse 80   Ht 5' 10" (1 778 m)   Wt 133 kg (293 lb)   BMI 42 04 kg/m²   Physical Exam  Constitutional:       General: He is not in acute distress  Appearance: Normal appearance  He is not ill-appearing  HENT:      Head: Normocephalic  Nose: Nose normal  No congestion or rhinorrhea  Mouth/Throat:      Mouth: Mucous membranes are moist       Pharynx: No oropharyngeal exudate or posterior oropharyngeal erythema  Eyes:      General:         Right eye: No discharge  Left eye: No discharge  Pupils: Pupils are equal, round, and reactive to light  Neck:      Vascular: No carotid bruit  Cardiovascular:      Rate and Rhythm: Normal rate and regular rhythm  Pulses: Normal pulses  Heart sounds: No murmur heard  No friction rub  No gallop  Pulmonary:      Effort: Pulmonary effort is normal  No respiratory distress  Breath sounds: No stridor  Abdominal:      General: Abdomen is flat  Bowel sounds are normal  There is no distension  Palpations: There is no mass  Tenderness: There is no abdominal tenderness        Hernia: No hernia is present  Musculoskeletal:         General: No swelling, tenderness, deformity or signs of injury  Normal range of motion  Cervical back: Normal range of motion  No rigidity  Lymphadenopathy:      Cervical: No cervical adenopathy  Neurological:      Mental Status: He is alert  This note was completed in part utilizing [employer] direct voice recognition software  Grammatical errors, random word insertion, spelling mistakes, occasional wrong word or "sound-alike" substitutions and incomplete sentences may be an occasional consequence of the system secondary to software limitations, ambient noise and hardware issues  At the time of dictation, efforts were made to edit, clarify and /or correct errors  Please read the chart carefully and recognize, using context, where substitutions have occurred  If you have any questions or concerns about the context, text or information contained within the body of this dictation, please contact myself, the provider, for further clarification

## 2021-08-20 ENCOUNTER — APPOINTMENT (OUTPATIENT)
Dept: CARDIAC REHAB | Facility: HOSPITAL | Age: 51
End: 2021-08-20
Payer: COMMERCIAL

## 2021-08-23 ENCOUNTER — APPOINTMENT (OUTPATIENT)
Dept: CARDIAC REHAB | Facility: HOSPITAL | Age: 51
End: 2021-08-23
Payer: COMMERCIAL

## 2021-08-23 NOTE — UTILIZATION REVIEW
URGENT/EMERGENT  INPATIENT/SPU AUTHORIZATION REQUEST    Date: 08/23/21            # Pages in this Request:     X New Request   Additional Information for PA#:     Office Contact Name:  Noble Tsai Title: Utilization Review, [place] Nurse     Phone: [phone]  Ext  Availability (Date/Time): Wednesday - Friday 8 am- 4 pm    x Inpatient Review  SPU Review        Current       x Late Pick-up   · How your facility was first notified of the Late Pick-up: Paths Letter   · When your facility was first notified of the Late Pick-up (date): 8/16/2021         RECIPIENT INFORMATION    Recipient ID#: [id number]    Recipient Name:  Chantelle Black     YOB: 1970  52 y o  Recipient Alias:     Gender:  X Male  Female Medicaid Eligibility ([address] [place] [address]): INSURANCE INFORMATION    (All other private or governmental health insurance benefits must be utilized prior to billing the [place] Program)    Was this admission the result of an MVA, other accident, assault, injury, fall, gunshot, bite etc ? Yes x No                   If yes, provide a brief description of the incident  Does the recipient have other insurance coverage? Yes x No        Insurance Company Name/Policy #      Did that insurance pay on this claim? Yes  No        Did that insurance deny this claim? Yes  No    If yes, reason for denial:      Does the recipient have Medicare? Yes x No        Did Medicare exhaust prior to this admission? Yes  No        Did Medicare partially pay this claim? Yes  No        Did that insurance deny this claim? Yes  No    If yes, reason for denial:          Was the recipient a prisoner at the time of admission?   Yes x No            PROVIDER INFORMATION    Hospital Name: [facility] Provider ID#: [id number]    Admitting Physician Name:[facility]  [facility] Provider ID#: [id number]        ADMISSION INFORMATION    Type of Admission: (please choose one)     ED     X Direct    If yes, from where? [facility] ED     Transfer    If yes, transferring hospital (inpatient, rehab, or psych) Provider Name/Provider ID#: Admission Floor or Unit Type:  Med Surg [facility]     Dates/Times:        ED Date/Time:         Observation Date/Time:         Admission Date/Time: 6/30/21  1:56 AM        Discharge or Transfer Date/Time: 7/1/2021  1:15 PM        DIAGNOSIS/PROCEDURE CODES    Primary Diagnosis Code/Primary Diagnosis Code description:  I21 09 ST elevation (STEMI) myocardial infarction involving other coronary artery of anterior wall   F17 200 Nicotine dependence, unspecified, uncomplicated   X32 7 Hyperlipidemia, unspecified  Additional Diagnosis Code(s) and Description(s)-(up to three additional codes):    Procedure Code (one) and description:  [id number] Dilation of 1 Cor Art with Drug-elut Intra, Perc Approach - DOS -  6/30- Time [id number]      CLINICAL INFORMATION - PRIOR ADMISSION ONLY    Is there a prior admission with a discharge date within 30 days of the date of this admission? X No (Proceed to the next section - "Clinical Information - General Review Checklist:)      Yes (Provide the following information)     Prior admission dates:    MA Prior Authorization Number:        Review Outcome:     Diagnosis Code(s)/Description:    Procedure Code/Description:    Findings:    Treatment:    Condition on Discharge:   Vitals:    Labs:   Imaging:   Medications:     Follow-up Instructions:    Disposition:        CLINICAL INFORMATION - GENERAL REVIEW CHECKLIST    EMERGENCY DEPARTMENT: (Proceed to "ADMISSION" if Direct Admission)    Presenting Signs/Symptoms:    Medication/treatment prior to arrival in the ED:    Past Medical History:         Clinical Exam:    Initial Vital Signs: (Temp, Pulse, Resp, and BP)       Pertinent Repeat Vital Signs: (include times they were obtained)    Pertinent Sustained Findings: (include times they were obtained)    Weight in Kilograms:    Pertinent Labs (results):    Radiology (results):    EKG (results): Other tests (results):    Tests pending final results:    Treatment in the ED:      Other treatments:      Change in condition while in the ED:     Response to ED Treatment:          OBSERVATION: (Proceed to "ADMISSION" if Direct Admission)    Orders written during the observation period  Meds Name, dose, route, time, how may doses given:  PRN Meds Name, dose, route, time, how many doses given within the first 24 hrs :  IVs Type, rate, and total amt  ordered/given:  Labs, imaging, other:  Consults and findings:    Test Results during the observation period  Pertinent Lab tests (dates/results):  Culture results (blood, urine, spinal, wound, respiratory, etc ):  Imaging tests (dates/results):  EKG (dates/results): Other test (dates/results):  Tests pending (dates/results):    Surgical or Invasive Procedures during the observation period  Name of surgery/procedure:  Date & Time:  Patient Response:  Post-operative orders:  Operative Report/Findings:    Response to Treatment, Major Change in Condition, Major Charge in Treatment during the observation period          ADMISSION:    DIRECT Admissions Only:48 year old male, presented to the ED @ [facility], Transferred to [facility] level of [facility] via EMS  Admitted as Inpatient due to STEMI,  Chest pain with anterior ST elevation  No known cardiac history  Active E-Cig use  Presenting Signs/Symptoms:  06/30/2021   Initially presented with intermittent chest pain radiating to his throat and jaw  Also associated pain in both arms but worse in left  Initial trop 0 13, trend trops  Initial ECG reported via ED documentation for ST-elevations in V3, V4 with reciprocal ST-depressions in leads I, aVL but unable to locate this ECG  In cath lab showing MARIA LUISA inferior leads  ECG en route via EMS appears normal  LHC via radial approach showed lesion at the ostium of LAD now s/p ALVARADO    Cardiac cath showed a critical mid LAD lesion at D1  Successful PCI was performed with placement of a 3 0 x 23 mm ALVARADO  D1 was jailed by the stent  K 3 7 - PO KCl given  Start ASA, Brilinta, Statin and Metop tartrate  TTE, A1C, lipid panel  Isolyte at 125mL/hr for 1L total  Then can stop IVF  ECG on arrival to floor and in the morning to evaluate evolution of ST-segment changes  Monitor on telemetry      · VTE ppx: SCDs, Lovenox 40mg daily in the morning  ·   · Medication/treatment prior to arrival:  ·   · Past Medical History:  ·   · Clinical Exam on admission:  ·   · Vital Signs on admission: (Temp, Pulse, Resp, and BP)  Triage Vitals   Temperature Pulse Respirations Blood Pressure SpO2   06/30/21 0230 06/30/21 0204 06/30/21 0204 06/30/21 0204 06/30/21 0230   98 °F (36 7 °C) 82 22 114/77 97 %       Temp Source Heart Rate Source Patient Position - Orthostatic VS BP Location FiO2 (%)   06/30/21 0230 06/30/21 0400 06/30/21 0400 06/30/21 0400 --   Oral Monitor Lying Left arm         Pain Score           06/30/21 0230           No Pain              Date/Time   Temp   Pulse   Resp   BP   MAP (mmHg)   SpO2   Calculated FIO2 (%) - Nasal Cannula   Nasal Cannula O2 Flow Rate (L/min)   O2 Device   Patient Position - Orthostatic VS   07/01/21 0800   --   --   --   --   --   --   --   --   None (Room air)   --   07/01/21 0730   97 8 °F (36 6 °C)   67   20   146/90   --   --   --   --   None (Room air)   Lying   07/01/21 02:42:19   98 2 °F (36 8 °C)   --   --   129/73   92   --   --   --   --   --   07/01/21 00:13:52   98 °F (36 7 °C)   --   --   124/70   88   --   --   --   --   --   06/30/21 2145   --   81   --   121/66   --   --   --   --   --   --   06/30/21 1800   --   78   22   119/59   80   95 %   --   --   --   --   06/30/21 1700   --   82   28Abnormal    139/68   86   95 %   --   --   --   --   06/30/21 1600   --   70   20   126/73   88   94 %   --   --   None (Room air)   Lying   06/30/21 1532   98 °F (36 7 °C)   --   --   --   --   --   --   --   --   -- 06/30/21 1500   --   74   25Abnormal    122/68   96   95 %   --   --   --   --   06/30/21 1400   --   76   26Abnormal    116/64   85   96 %   --   --   --   --   06/30/21 1300   --   78   21   117/64   80   93 %   --   --   None (Room air)   Lying   06/30/21 1200   --   76   20   112/63   80   95 %   --   --   None (Room air)   Lying   06/30/21 1100   97 8 °F (36 6 °C)   80   20   147/80   98   94 %   --   --   None (Room air)   Lying   06/30/21 1000   --   74   22   127/66   87   94 %   --   --   --   --   06/30/21 0923   --   84   --   117/70   --   --   --   --   --   --   06/30/21 0900   --   82   22   117/70   81   94 %   --   --   --   --   06/30/21 0830   --   70   20   115/56   80   93 %   --   --   --   --   06/30/21 0800   98 °F (36 7 °C)   78   22   125/76   98   95 %   --   --   None (Room air)   Lying   06/30/21 0704   98 2 °F (36 8 °C)   --   --   --   --   --   --   --   --   --   06/30/21 0700   --   72   22   122/80   100   95 %   --   --   --   --   06/30/21 0600   --   74   19   116/73   86   93 %   --   --   --   --   06/30/21 0500   98 °F (36 7 °C)   74   19   118/71   84   93 %   --   --   --   --   06/30/21 0400   --   82   19   133/77   95   92 %   --   --   None (Room air)   Lying   06/30/21 0330   --   82   30Abnormal    116/69   83   96 %   --   --   --   --   06/30/21 0300   --   80   23Abnormal    121/75   90   95 %   --   --   None (Room air)       ·   · Weight in kilograms:   06/30/21 (!) 136 kg (300 lb 0 7 oz)        ALL Admissions:    Admission Orders and Other Orders written within the first 24 hrs after admission  Meds Name, dose, route, time, how may doses given:  aspirin, 81 mg, Oral, Daily  atorvastatin, 40 mg, Oral, Daily With Dinner  clopidogrel, 75 mg, Oral, Daily  enoxaparin, 40 mg, Subcutaneous, Daily  lisinopril, 5 mg, Oral, Daily  metoprolol tartrate, 25 mg, Oral, Q12H [address]  [MAR Hold] nicotine, 21 mg, Transdermal, Daily  potassium chloride, 40 mEq, Oral, Daily       PRN Meds Name, dose, route, time, how many doses given within the first 24 hrs :    acetaminophen, 650 mg, Oral, Q8H PRN  polyethylene glycol, 17 g, Oral, Daily PRN           IVs Type, rate, and total amt  ordered/given:  Labs, imaging, other:  2021 @ 0903  Chest X:  No acute cardiopulmonary disease     2021 @ 0254  EC, NSR     Pertinent Labs/Diagnostic Test Results:            Results from last 7 days   Lab Units 21  1051 21  0855 21  2305   WBC Thousand/uL 11 11* 7 20 8 52   HEMOGLOBIN g/dL 16 5 16 2 16 6   HEMATOCRIT % 49 0 47 2 48 6   PLATELETS Thousands/uL 175 177 185   NEUTROS ABS Thousands/µL  --   --  4 90             Results from last 7 days   Lab Units 21  1051 21  0603 21  2305   SODIUM mmol/L 135* 134* 140   POTASSIUM mmol/L 3 6 4 3 3 7   CHLORIDE mmol/L 105 105 103   CO2 mmol/L 26 22 28   ANION GAP mmol/L 4 7 9   BUN mg/dL 15 15 16   CREATININE mg/dL 0 80 0 95 1 13   EGFR ml/min/1 73sq m 106 94 76   CALCIUM mg/dL 8 9 8 7 8 4   MAGNESIUM mg/dL 2 3  --   --            Results from last 7 days   Lab Units 21  2305   AST U/L 24   ALT U/L 35   ALK PHOS U/L 89   TOTAL PROTEIN g/dL 7 3   ALBUMIN g/dL 3 9   TOTAL BILIRUBIN mg/dL 0 51             Results from last 7 days   Lab Units 21  1051 21  0603 21  2305   GLUCOSE RANDOM mg/dL 143* 158* 124           Results from last 7 days   Lab Units 21  2305   HEMOGLOBIN A1C % 5 4   EAG mg/dl 108             Results from last 7 days   Lab Units 21  1051 21  0442 21  2305   TROPONIN I ng/mL 2 66 1 42 0 13*           Results from last 7 days   Lab Units 21  2305   D-DIMER QUANTITATIVE ug/ml FEU 0 52*           Results from last 7 days   Lab Units 21  2305   PROTIME seconds 12 6   INR   0 96   PTT seconds 30           Results from last 7 days   Lab Units 21  2305   LIPASE u/L 176        Consults and findings:    Test Results after admission  Pertinent Lab tests (dates/results):  Culture results (blood, urine, spinal, wound, respiratory, etc ):  Imaging tests (dates/results):  EKG (dates/results): Other test (dates/results):  Tests pending (dates/results):    Surgical or Invasive Procedures  Name of surgery/procedure:  Cardiac Cath - PTCA w/ stent placement   Date & Time: 6/30/2021  00:22-01:26  Patient Response: tolerated  Post-operative orders: same   Operative Report/Findings:  CORONARY VESSELS:   --  The coronary circulation is right dominant  --  Left main: Normal    --  LAD: The vessel was medium sized  --  Mid LAD: There was a tubular 95 % stenosis at the origin of D1  The lesion was irregularly contoured and without evidence of thrombus  There was ELOISE grade 2 flow through the vessel (partial perfusion)  This is a likely culprit for the   patient's clinical presentation  An intervention was performed  --  1st diagonal: The vessel was small to medium sized  --  2nd diagonal: The vessel was small sized  --  Circumflex: The vessel was medium to large sized  --  Mid circumflex: There was a tubular 40 % stenosis in the proximal third of the vessel segment  --  1st obtuse marginal: The vessel was medium sized  Angiography showed mild atherosclerosis  --  1st left posterolateral: The vessel was small sized  --  RCA: The vessel was medium sized  Angiography showed moderate atherosclerosis  LESION INTERVENTION: A successful drug-eluting stent procedure was performed on the 95 % lesion in the mid LAD  Following intervention there was an excellent angiographic appearance with a 0 % residual stenosis  This was not a bifurcation lesion  This was an ACC/AHA type C "high risk" lesion for intervention  There was no evidence of the transient no-reflow phenomenon  The residual lesion was tubular  There was ELOISE 2 flow before the procedure and ELOISE 3 flow after the  procedure  There was no dissection         Response to Treatment, Major Change in Condition, Major Charge in Treatment anytime during admission  Created Using Review Status Review Entered   [employer]®  In Primary 7/1/2021 12:41       Criteria Set Name - Subset   LOC:Acute Adult-Acute Coronary Syndrome (ACS)      Criteria Review   REVIEW SUMMARY     Patient: Michael Suarez  Review Number: [id number]  Review Status: In Primary  Criteria Status: Critical Met  Day Met: Episode Day 1     Condition Specific: Yes        OUTCOMES  Outcome Type: Primary           REVIEW DETAILS     Product: Alex Morel Adult  Subset: Acute Coronary Syndrome (ACS)      (Symptom or finding within 24h)         (Excludes PO medications unless noted)          [X] Select Day, One:              [X] Episode Day 1, One:                  [X] CRITICAL, >= One:                      [X] STEMI     Version: Jostle 2020  [employer]  © 2020 [employer] and/or one of its subsidiaries  All Rights Reserved  CPT only © 2019 American [employer] Association  All Rights Reserved  Disposition on Discharge  Home, Rehab, SNF, LTC, Shelter, etc : Home/Self Care    Cease to Breathe (CTB)  If a patient expires during an admission, in addition to the above information, please include:    Summary/timeline of the patient's decline in condition:    Medications and treatment:    Patient response to treatment:    Date and time patient ceased to breathe:        Is there a Readmission that follows this admission? Yes x No    If yes, provide dates:          InterQual Review    InterQual Criteria Met: X Yes  No  N/A        Please include the InterQual Review, InterQual year/version used, and the criteria selected:   Created Using Review Status Review Entered   [employer]  In Primary 7/1/2021 12:41       Criteria Set Name - Subset   LOC:Acute Adult-Acute Coronary Syndrome (ACS)      Criteria Review   REVIEW SUMMARY     Patient: Michael Suarez  Review Number: [id number]  Review Status:  In Primary  Criteria Status: Critical Met  Day Met: Episode Day 1     Condition Specific: Yes        OUTCOMES  Outcome Type: Primary           REVIEW DETAILS     Product: Aleene Wilbert Adult  Subset: Acute Coronary Syndrome (ACS)      (Symptom or finding within 24h)         (Excludes PO medications unless noted)          [X] Select Day, One:              [X] Episode Day 1, One:                  [X] CRITICAL, >= One:                      [X] STEMI     Version: [employer] UniverseQual® 2020  [employer]® and [employer]®  © 2020 [employer] and/or one of its subsidiaries  All Rights Reserved  CPT only © 2019 American Medical Association  All Rights Reserved  PLEASE SUBMIT THE COMPLETED FORM TO THE DEPARTMENT OF HUMAN SERVICES - DIVISION OF CLINICAL  REVIEW VIA FAX -[phone] or VIA E-MAIL TO [email or web address]    Signature: Ruperto Chicas Date:  08/23/21    Confidentiality Notice: The documents accompanying this telecopy may contain confidential information belonging to the sender  The information is intended only for the use of the individual named above  If you are not the intended recipient, you are hereby notified  That any disclosure, copying, distribution or taking of any telecopy is strictly prohibited

## 2021-08-31 ENCOUNTER — TELEPHONE (OUTPATIENT)
Dept: CARDIOLOGY CLINIC | Facility: HOSPITAL | Age: 51
End: 2021-08-31

## 2021-08-31 ENCOUNTER — HOSPITAL ENCOUNTER (OUTPATIENT)
Dept: NON INVASIVE DIAGNOSTICS | Facility: HOSPITAL | Age: 51
Discharge: HOME | End: 2021-08-31
Attending: INTERNAL MEDICINE
Payer: COMMERCIAL

## 2021-08-31 DIAGNOSIS — I25.10 CORONARY ARTERY DISEASE INVOLVING NATIVE CORONARY ARTERY OF NATIVE HEART WITHOUT ANGINA PECTORIS: Primary | ICD-10-CM

## 2021-08-31 DIAGNOSIS — I25.10 CORONARY ARTERY DISEASE INVOLVING NATIVE HEART WITHOUT ANGINA PECTORIS, UNSPECIFIED VESSEL OR LESION TYPE: ICD-10-CM

## 2021-08-31 DIAGNOSIS — N52.2 DRUG-INDUCED ERECTILE DYSFUNCTION: ICD-10-CM

## 2021-08-31 DIAGNOSIS — I21.02 ACUTE ST ELEVATION MYOCARDIAL INFARCTION (STEMI) DUE TO OCCLUSION OF MID PORTION OF LEFT ANTERIOR DESCENDING (LAD) CORONARY ARTERY (HCC): ICD-10-CM

## 2021-08-31 PROCEDURE — 93018 CV STRESS TEST I&R ONLY: CPT | Performed by: INTERNAL MEDICINE

## 2021-08-31 PROCEDURE — 93017 CV STRESS TEST TRACING ONLY: CPT

## 2021-08-31 PROCEDURE — 93016 CV STRESS TEST SUPVJ ONLY: CPT | Performed by: INTERNAL MEDICINE

## 2021-08-31 RX ORDER — METOPROLOL SUCCINATE 50 MG/1
50 TABLET, EXTENDED RELEASE ORAL DAILY
Qty: 30 TABLET | Refills: 3 | Status: SHIPPED | OUTPATIENT
Start: 2021-08-31 | End: 2022-01-12 | Stop reason: SDUPTHER

## 2021-08-31 RX ORDER — SILDENAFIL 25 MG/1
25 TABLET, FILM COATED ORAL DAILY PRN
Qty: 10 TABLET | Refills: 3 | Status: SHIPPED | OUTPATIENT
Start: 2021-08-31

## 2021-08-31 NOTE — TELEPHONE ENCOUNTER
Per Dr Dari Ratliff S/W pt re: result of stress test from today did not show ant blockages  Pt informs that he does not want to take Bystolic that he will continue Toprol XL and take Viagra  Joel Gauthier PA-C  Forwarded Rx's for these meds to the pharmacy and I cautioned pt about using (SL) NTG with Viagra and visa versa  Verbalized understanding

## 2021-10-05 ENCOUNTER — OFFICE VISIT (OUTPATIENT)
Dept: URGENT CARE | Facility: CLINIC | Age: 51
End: 2021-10-05
Payer: COMMERCIAL

## 2021-10-05 ENCOUNTER — APPOINTMENT (OUTPATIENT)
Dept: RADIOLOGY | Facility: CLINIC | Age: 51
End: 2021-10-05
Payer: COMMERCIAL

## 2021-10-05 VITALS
SYSTOLIC BLOOD PRESSURE: 134 MMHG | RESPIRATION RATE: 18 BRPM | OXYGEN SATURATION: 98 % | BODY MASS INDEX: 41.95 KG/M2 | DIASTOLIC BLOOD PRESSURE: 72 MMHG | HEART RATE: 86 BPM | HEIGHT: 70 IN | TEMPERATURE: 99.6 F | WEIGHT: 293 LBS

## 2021-10-05 DIAGNOSIS — R05.9 COUGH: ICD-10-CM

## 2021-10-05 DIAGNOSIS — J06.9 ACUTE URI: Primary | ICD-10-CM

## 2021-10-05 PROCEDURE — 71046 X-RAY EXAM CHEST 2 VIEWS: CPT

## 2021-10-05 PROCEDURE — 0241U HB NFCT DS VIR RESP RNA 4 TRGT: CPT | Performed by: PHYSICIAN ASSISTANT

## 2021-10-05 PROCEDURE — 99213 OFFICE O/P EST LOW 20 MIN: CPT | Performed by: PHYSICIAN ASSISTANT

## 2021-10-05 RX ORDER — ALBUTEROL SULFATE 90 UG/1
2 AEROSOL, METERED RESPIRATORY (INHALATION) EVERY 4 HOURS PRN
Qty: 18 G | Refills: 0 | Status: SHIPPED | OUTPATIENT
Start: 2021-10-05 | End: 2021-10-11 | Stop reason: SDUPTHER

## 2021-10-05 RX ORDER — PREDNISONE 10 MG/1
TABLET ORAL
Qty: 26 TABLET | Refills: 0 | Status: SHIPPED | OUTPATIENT
Start: 2021-10-05 | End: 2021-10-11 | Stop reason: DRUGHIGH

## 2021-10-07 LAB
FLUAV RNA RESP QL NAA+PROBE: NEGATIVE
FLUBV RNA RESP QL NAA+PROBE: NEGATIVE
RSV RNA RESP QL NAA+PROBE: POSITIVE
SARS-COV-2 RNA RESP QL NAA+PROBE: NEGATIVE

## 2021-10-08 ENCOUNTER — TELEPHONE (OUTPATIENT)
Dept: URGENT CARE | Facility: CLINIC | Age: 51
End: 2021-10-08

## 2021-10-11 ENCOUNTER — APPOINTMENT (OUTPATIENT)
Dept: RADIOLOGY | Facility: CLINIC | Age: 51
End: 2021-10-11
Payer: COMMERCIAL

## 2021-10-11 ENCOUNTER — OFFICE VISIT (OUTPATIENT)
Dept: URGENT CARE | Facility: CLINIC | Age: 51
End: 2021-10-11
Payer: COMMERCIAL

## 2021-10-11 VITALS
OXYGEN SATURATION: 95 % | TEMPERATURE: 98.2 F | BODY MASS INDEX: 40.6 KG/M2 | RESPIRATION RATE: 18 BRPM | WEIGHT: 290 LBS | HEIGHT: 71 IN | HEART RATE: 83 BPM

## 2021-10-11 DIAGNOSIS — B33.8 RSV INFECTION: ICD-10-CM

## 2021-10-11 DIAGNOSIS — J20.9 ACUTE BRONCHITIS, UNSPECIFIED ORGANISM: ICD-10-CM

## 2021-10-11 DIAGNOSIS — J20.9 ACUTE BRONCHITIS, UNSPECIFIED ORGANISM: Primary | ICD-10-CM

## 2021-10-11 PROCEDURE — 99213 OFFICE O/P EST LOW 20 MIN: CPT | Performed by: NURSE PRACTITIONER

## 2021-10-11 PROCEDURE — 71046 X-RAY EXAM CHEST 2 VIEWS: CPT

## 2021-10-11 RX ORDER — ALBUTEROL SULFATE 90 UG/1
2 AEROSOL, METERED RESPIRATORY (INHALATION) EVERY 4 HOURS PRN
Qty: 18 G | Refills: 1 | Status: SHIPPED | OUTPATIENT
Start: 2021-10-11 | End: 2022-06-12 | Stop reason: SDUPTHER

## 2021-10-11 RX ORDER — PREDNISONE 10 MG/1
TABLET ORAL
Qty: 42 TABLET | Refills: 0 | Status: SHIPPED | OUTPATIENT
Start: 2021-10-11 | End: 2022-06-12 | Stop reason: ALTCHOICE

## 2021-10-11 RX ORDER — PREDNISONE 10 MG/1
TABLET ORAL
Qty: 42 TABLET | Refills: 0 | Status: SHIPPED | OUTPATIENT
Start: 2021-10-11 | End: 2021-10-11

## 2021-12-23 ENCOUNTER — OFFICE VISIT (OUTPATIENT)
Dept: CARDIOLOGY CLINIC | Facility: HOSPITAL | Age: 51
End: 2021-12-23
Payer: COMMERCIAL

## 2021-12-23 ENCOUNTER — APPOINTMENT (OUTPATIENT)
Dept: LAB | Facility: HOSPITAL | Age: 51
End: 2021-12-23
Attending: INTERNAL MEDICINE
Payer: COMMERCIAL

## 2021-12-23 VITALS
DIASTOLIC BLOOD PRESSURE: 82 MMHG | BODY MASS INDEX: 40.52 KG/M2 | HEART RATE: 99 BPM | WEIGHT: 289.4 LBS | SYSTOLIC BLOOD PRESSURE: 139 MMHG | HEIGHT: 71 IN

## 2021-12-23 DIAGNOSIS — I25.10 CORONARY ARTERY DISEASE INVOLVING NATIVE CORONARY ARTERY OF NATIVE HEART WITHOUT ANGINA PECTORIS: ICD-10-CM

## 2021-12-23 DIAGNOSIS — Z72.0 TOBACCO USE: ICD-10-CM

## 2021-12-23 DIAGNOSIS — I25.10 CORONARY ARTERY DISEASE INVOLVING NATIVE HEART WITHOUT ANGINA PECTORIS, UNSPECIFIED VESSEL OR LESION TYPE: ICD-10-CM

## 2021-12-23 DIAGNOSIS — E78.5 DYSLIPIDEMIA: ICD-10-CM

## 2021-12-23 DIAGNOSIS — I21.02 STEMI INVOLVING LEFT ANTERIOR DESCENDING CORONARY ARTERY (HCC): ICD-10-CM

## 2021-12-23 DIAGNOSIS — I21.02 ACUTE ST ELEVATION MYOCARDIAL INFARCTION (STEMI) DUE TO OCCLUSION OF MID PORTION OF LEFT ANTERIOR DESCENDING (LAD) CORONARY ARTERY (HCC): Primary | ICD-10-CM

## 2021-12-23 LAB
CHOLEST SERPL-MCNC: 181 MG/DL
HDLC SERPL-MCNC: 31 MG/DL
LDLC SERPL CALC-MCNC: 98 MG/DL (ref 0–100)
TRIGL SERPL-MCNC: 262 MG/DL

## 2021-12-23 PROCEDURE — 36415 COLL VENOUS BLD VENIPUNCTURE: CPT

## 2021-12-23 PROCEDURE — 99214 OFFICE O/P EST MOD 30 MIN: CPT | Performed by: INTERNAL MEDICINE

## 2021-12-23 PROCEDURE — 80061 LIPID PANEL: CPT

## 2021-12-23 RX ORDER — NITROGLYCERIN 0.4 MG/1
0.4 TABLET SUBLINGUAL
Qty: 30 TABLET | Refills: 3 | Status: SHIPPED | OUTPATIENT
Start: 2021-12-23

## 2021-12-30 DIAGNOSIS — E78.5 DYSLIPIDEMIA: Primary | ICD-10-CM

## 2022-01-04 ENCOUNTER — TELEPHONE (OUTPATIENT)
Dept: CARDIOLOGY CLINIC | Facility: HOSPITAL | Age: 52
End: 2022-01-04

## 2022-01-04 NOTE — TELEPHONE ENCOUNTER
Called pt to inform him that DOT Cardiovascular Form has been completed by Dr Ada Negrete and has been faxed to Theresa Hall PA-C as he has requested @ [phone]

## 2022-01-09 ENCOUNTER — HOSPITAL ENCOUNTER (EMERGENCY)
Facility: HOSPITAL | Age: 52
Discharge: HOME | End: 2022-01-09
Attending: EMERGENCY MEDICINE
Payer: COMMERCIAL

## 2022-01-09 ENCOUNTER — APPOINTMENT (EMERGENCY)
Dept: CT IMAGING | Facility: HOSPITAL | Age: 52
End: 2022-01-09
Payer: COMMERCIAL

## 2022-01-09 VITALS
WEIGHT: 289 LBS | SYSTOLIC BLOOD PRESSURE: 118 MMHG | HEART RATE: 93 BPM | BODY MASS INDEX: 40.46 KG/M2 | OXYGEN SATURATION: 94 % | HEIGHT: 71 IN | DIASTOLIC BLOOD PRESSURE: 78 MMHG | RESPIRATION RATE: 18 BRPM | TEMPERATURE: 99.2 F

## 2022-01-09 DIAGNOSIS — S32.009A LUMBAR TRANSVERSE PROCESS FRACTURE (HCC): ICD-10-CM

## 2022-01-09 DIAGNOSIS — M54.50 ACUTE LOW BACK PAIN: Primary | ICD-10-CM

## 2022-01-09 DIAGNOSIS — S22.39XA RIB FRACTURE: ICD-10-CM

## 2022-01-09 LAB
ANION GAP SERPL CALCULATED.3IONS-SCNC: 8 MMOL/L (ref 4–13)
BASOPHILS # BLD AUTO: 0.06 THOUSANDS/ΜL (ref 0–0.1)
BASOPHILS NFR BLD AUTO: 1 % (ref 0–1)
BUN SERPL-MCNC: 12 MG/DL (ref 5–25)
CALCIUM SERPL-MCNC: 9.3 MG/DL (ref 8.3–10.1)
CHLORIDE SERPL-SCNC: 101 MMOL/L (ref 100–108)
CO2 SERPL-SCNC: 29 MMOL/L (ref 21–32)
CREAT SERPL-MCNC: 1.11 MG/DL (ref 0.6–1.3)
EOSINOPHIL # BLD AUTO: 0.26 THOUSAND/ΜL (ref 0–0.61)
EOSINOPHIL NFR BLD AUTO: 4 % (ref 0–6)
ERYTHROCYTE [DISTWIDTH] IN BLOOD BY AUTOMATED COUNT: 12.8 % (ref 11.6–15.1)
GFR SERPL CREATININE-BSD FRML MDRD: 77 ML/MIN/1.73SQ M
GLUCOSE SERPL-MCNC: 112 MG/DL (ref 65–140)
HCT VFR BLD AUTO: 43.2 % (ref 36.5–49.3)
HGB BLD-MCNC: 14.9 G/DL (ref 12–17)
IMM GRANULOCYTES # BLD AUTO: 0.02 THOUSAND/UL (ref 0–0.2)
IMM GRANULOCYTES NFR BLD AUTO: 0 % (ref 0–2)
LYMPHOCYTES # BLD AUTO: 0.9 THOUSANDS/ΜL (ref 0.6–4.47)
LYMPHOCYTES NFR BLD AUTO: 12 % (ref 14–44)
MCH RBC QN AUTO: 29 PG (ref 26.8–34.3)
MCHC RBC AUTO-ENTMCNC: 34.5 G/DL (ref 31.4–37.4)
MCV RBC AUTO: 84 FL (ref 82–98)
MONOCYTES # BLD AUTO: 0.46 THOUSAND/ΜL (ref 0.17–1.22)
MONOCYTES NFR BLD AUTO: 6 % (ref 4–12)
NEUTROPHILS # BLD AUTO: 5.79 THOUSANDS/ΜL (ref 1.85–7.62)
NEUTS SEG NFR BLD AUTO: 77 % (ref 43–75)
NRBC BLD AUTO-RTO: 0 /100 WBCS
PLATELET # BLD AUTO: 206 THOUSANDS/UL (ref 149–390)
PMV BLD AUTO: 9.4 FL (ref 8.9–12.7)
POTASSIUM SERPL-SCNC: 4.6 MMOL/L (ref 3.5–5.3)
RBC # BLD AUTO: 5.14 MILLION/UL (ref 3.88–5.62)
SODIUM SERPL-SCNC: 138 MMOL/L (ref 136–145)
WBC # BLD AUTO: 7.49 THOUSAND/UL (ref 4.31–10.16)

## 2022-01-09 PROCEDURE — 96374 THER/PROPH/DIAG INJ IV PUSH: CPT

## 2022-01-09 PROCEDURE — 99285 EMERGENCY DEPT VISIT HI MDM: CPT | Performed by: EMERGENCY MEDICINE

## 2022-01-09 PROCEDURE — G1004 CDSM NDSC: HCPCS

## 2022-01-09 PROCEDURE — 36415 COLL VENOUS BLD VENIPUNCTURE: CPT | Performed by: EMERGENCY MEDICINE

## 2022-01-09 PROCEDURE — 80048 BASIC METABOLIC PNL TOTAL CA: CPT | Performed by: EMERGENCY MEDICINE

## 2022-01-09 PROCEDURE — 99284 EMERGENCY DEPT VISIT MOD MDM: CPT

## 2022-01-09 PROCEDURE — 74177 CT ABD & PELVIS W/CONTRAST: CPT

## 2022-01-09 PROCEDURE — 96361 HYDRATE IV INFUSION ADD-ON: CPT

## 2022-01-09 PROCEDURE — 96375 TX/PRO/DX INJ NEW DRUG ADDON: CPT

## 2022-01-09 PROCEDURE — 85025 COMPLETE CBC W/AUTO DIFF WBC: CPT | Performed by: EMERGENCY MEDICINE

## 2022-01-09 RX ORDER — NAPROXEN 500 MG/1
500 TABLET ORAL 2 TIMES DAILY PRN
Qty: 30 TABLET | Refills: 0 | Status: SHIPPED | OUTPATIENT
Start: 2022-01-09 | End: 2022-06-12 | Stop reason: ALTCHOICE

## 2022-01-09 RX ORDER — KETOROLAC TROMETHAMINE 30 MG/ML
15 INJECTION, SOLUTION INTRAMUSCULAR; INTRAVENOUS ONCE
Status: COMPLETED | OUTPATIENT
Start: 2022-01-09 | End: 2022-01-09

## 2022-01-09 RX ORDER — OXYCODONE HYDROCHLORIDE 5 MG/1
5 TABLET ORAL EVERY 4 HOURS PRN
Qty: 7 TABLET | Refills: 0 | Status: SHIPPED | OUTPATIENT
Start: 2022-01-09 | End: 2022-01-12

## 2022-01-09 RX ORDER — HYDROMORPHONE HCL/PF 1 MG/ML
0.5 SYRINGE (ML) INJECTION ONCE
Status: COMPLETED | OUTPATIENT
Start: 2022-01-09 | End: 2022-01-09

## 2022-01-09 RX ADMIN — SODIUM CHLORIDE 1000 ML: 0.9 INJECTION, SOLUTION INTRAVENOUS at 15:49

## 2022-01-09 RX ADMIN — IOHEXOL 100 ML: 350 INJECTION, SOLUTION INTRAVENOUS at 16:23

## 2022-01-09 RX ADMIN — HYDROMORPHONE HYDROCHLORIDE 0.5 MG: 1 INJECTION, SOLUTION INTRAMUSCULAR; INTRAVENOUS; SUBCUTANEOUS at 15:50

## 2022-01-09 RX ADMIN — KETOROLAC TROMETHAMINE 15 MG: 30 INJECTION, SOLUTION INTRAMUSCULAR; INTRAVENOUS at 15:50

## 2022-01-09 NOTE — ED PROVIDER NOTES
Final Diagnosis:  1  Acute low back pain    2  Lumbar transverse process fracture ([facility] [address] )    3  Rib fracture        Chief Complaint   Patient presents with    Back Pain     patient reports that he slipped on step and fell, striking his right lower back against step  denies any loc  also reports swelling in left fifth digit  HPI  Patient presents for evaluation of right-sided back pain  Patient states that he fell and struck the right portion of his back against the edge of a step  Immediate onset of significant pain  Denies any radiations  This is worse when he attempts to ambulate or move his lower extremities  Worse with pressure  No relief  Patient denies any head strike or loss conscious  Denies any abdominal pain  No nausea or vomiting  Denies any incontinence or saddle anesthesia  Unless otherwise specified:  - No language barrier    - History obtained from patient  - There are no limitations to the history obtained  - Previous charting was reviewed    PMH:   has a past medical history of Heart attack ([facility] ) (06/28/2021)  PSH:   has a past surgical history that includes Finger fracture surgery; Mouth surgery; and Coronary angioplasty  ROS:  Review of Systems   -   - 13 point ROS was performed and all are normal unless stated in the history above  - Nursing note reviewed  Vitals reviewed  - Orders placed by myself and/or advanced practitioner / resident  PE:   Vitals:    01/09/22 1518   BP: 118/78   BP Location: Right arm   Pulse: 93   Resp: 18   Temp: 99 2 °F (37 3 °C)   TempSrc: Temporal   SpO2: 94%   Weight: 131 kg (289 lb)   Height: 5' 11" (1 803 m)     Vitals reviewed by me  Patient appears uncomfortable laying in bed  Patient identifies is the area that struck a step as being in his mid scapular line on the lower half of his back  There is tenderness here with some mild swelling  No obvious bruising    Patient with tenderness diffusely on the side with palpation  Patient also with additional discomfort when he attempts to lose move his lower extremities  No other sign of trauma  Unless otherwise specified above:    General: VS reviewed  Appears in NAD    Head: Normocephalic, atraumatic  Eyes: EOM-I  No exudate  ENT: Atraumatic external nose and ears  No malocclusion  No stridor  No drooling  Neck: No JVD  CV: No pallor noted  Lungs:   No tachypnea  No respiratory distress    Abdomen:  Soft, non-tender, non-distended    MSK:   FROM spontaneously  No obvious deformity    Skin: Dry, intact  No obvious rash  Neuro: Awake, alert, GCS15, CN II-XII grossly intact  Speaking in full sentences  Motor grossly intact  Psychiatric/Behavioral: Appropriate mood and affect   Exam: deferred    Physical Exam     Procedures   A:  - Nursing note reviewed  CT abdomen pelvis with contrast   Final Result      Nondisplaced right 12th rib fracture and multiple displaced right transverse process fracture from L1 to L4  No hematoma  Subcentimeter hypodensities in the spleen most likely represent small cyst or hemangioma  Ultrasound characterization on nonemergent basis is recommended               I personally discussed this study with Park Eva on 1/9/2022 at 4:57 PM                Workstation performed: [id number]           Orders Placed This Encounter   Procedures    CT abdomen pelvis with contrast    CBC and differential    Basic metabolic panel    Insert peripheral IV     Labs Reviewed   CBC AND DIFFERENTIAL - Abnormal       Result Value Ref Range Status    WBC 7 49  4 31 - 10 16 Thousand/uL Final    RBC 5 14  3 88 - 5 62 Million/uL Final    Hemoglobin 14 9  12 0 - 17 0 g/dL Final    Hematocrit 43 2  36 5 - 49 3 % Final    MCV 84  82 - 98 fL Final    MCH 29 0  26 8 - 34 3 pg Final    MCHC 34 5  31 4 - 37 4 g/dL Final    RDW 12 8  11 6 - 15 1 % Final    MPV 9 4  8 9 - 12 7 fL Final    Platelets 814  746 - 390 Thousands/uL Final    nRBC 0  /100 WBCs Final    Neutrophils Relative 77 (*) 43 - 75 % Final    Immat GRANS % 0  0 - 2 % Final    Lymphocytes Relative 12 (*) 14 - 44 % Final    Monocytes Relative 6  4 - 12 % Final    Eosinophils Relative 4  0 - 6 % Final    Basophils Relative 1  0 - 1 % Final    Neutrophils Absolute 5 79  1 85 - 7 62 Thousands/µL Final    Immature Grans Absolute 0 02  0 00 - 0 20 Thousand/uL Final    Lymphocytes Absolute 0 90  0 60 - 4 47 Thousands/µL Final    Monocytes Absolute 0 46  0 17 - 1 22 Thousand/µL Final    Eosinophils Absolute 0 26  0 00 - 0 61 Thousand/µL Final    Basophils Absolute 0 06  0 00 - 0 10 Thousands/µL Final   BASIC METABOLIC PANEL    Sodium 796  136 - 145 mmol/L Final    Potassium 4 6  3 5 - 5 3 mmol/L Final    Chloride 101  100 - 108 mmol/L Final    CO2 29  21 - 32 mmol/L Final    ANION GAP 8  4 - 13 mmol/L Final    BUN 12  5 - 25 mg/dL Final    Creatinine 1 11  0 60 - 1 30 mg/dL Final    Comment: Standardized to IDMS reference method    Glucose 112  65 - 140 mg/dL Final    Comment: If the patient is fasting, the ADA then defines impaired fasting glucose as > 100 mg/dL and diabetes as > or equal to 123 mg/dL  Specimen collection should occur prior to Sulfasalazine administration due to the potential for falsely depressed results  Specimen collection should occur prior to Sulfapyridine administration due to the potential for falsely elevated results      Calcium 9 3  8 3 - 10 1 mg/dL Final    eGFR 77  ml/min/1 73sq m Final    Narrative:     [place] guidelines for Chronic Kidney Disease (CKD):     Stage 1 with normal or high GFR (GFR > 90 mL/min/1 73 square meters)    Stage 2 Mild CKD (GFR = 60-89 mL/min/1 73 square meters)    Stage 3A Moderate CKD (GFR = 45-59 mL/min/1 73 square meters)    Stage 3B Moderate CKD (GFR = 30-44 mL/min/1 73 square meters)    Stage 4 Severe CKD (GFR = 15-29 mL/min/1 73 square meters)    Stage 5 End Stage CKD (GFR <15 mL/min/1 73 square meters)  Note: GFR calculation is accurate only with a steady state creatinine         Final Diagnosis:  1  Acute low back pain    2  Lumbar transverse process fracture (HCC)    3  Rib fracture        P:  - laboratory analysis unremarkable  CT scan showed transverse process fractures of L1 through L4 as well as a nondisplaced rib fracture  Discussed results with Neurosurgery, Dr Brewster, who agreed that as long as the patient's pain was controlled that he could follow-up with them  I discussed this with the patient and wife bedside  They are comfortable with this plan  Provided with pain medication  Instructed follow-up with her primary care as well as Neurosurgery  Return precautions discussed  Medications   sodium chloride 0 9 % bolus 1,000 mL (1,000 mL Intravenous New Bag 1/9/22 1549)   ketorolac (TORADOL) injection 15 mg (15 mg Intravenous Given 1/9/22 1550)   HYDROmorphone (DILAUDID) injection 0 5 mg (0 5 mg Intravenous Given 1/9/22 1550)   iohexol (OMNIPAQUE) 350 MG/ML injection (SINGLE-DOSE) 100 mL (100 mL Intravenous Given 1/9/22 1623)     Time reflects when diagnosis was documented in both MDM as applicable and the Disposition within this note     Time User Action Codes Description Comment    1/9/2022  5:14 PM Martin Howell Add [M54 50] Acute low back pain     1/9/2022  5:15 PM Martin Howell Add [S32 009A] Lumbar transverse process fracture ([address] )     1/9/2022  5:15 PM Tasneem Patella Add [S22 39XA] Rib fracture       ED Disposition     ED Disposition Condition Date/Time Comment    Discharge Stable Sun Jan 9, 2022  5:14 PM Negrito Garcia discharge to home/self care              Follow-up Information     Follow up With Specialties Details Why Contact Info Additional Information    Micheal Jorgensen MD    [address]  [address]  [facility] [address] [facility] [address]       [address] [facility] Neurosurgery Schedule an appointment as soon as possible for a visit   [address]  [place] [address] [facility] [address]  [address] [place], [place] [address]vd, [address], [address] [facility] [place], [place], [address] [phone]        Patient's Medications   Discharge Prescriptions    NAPROXEN (NAPROSYN) 500 MG TABLET    Take 1 tablet (500 mg total) by mouth 2 (two) times a day as needed for mild pain       Start Date: 1/9/2022  End Date: --       Order Dose: 500 mg       Quantity: 30 tablet    Refills: 0    OXYCODONE (ROXICODONE) 5 IMMEDIATE RELEASE TABLET    Take 1 tablet (5 mg total) by mouth every 4 (four) hours as needed for moderate pain for up to 3 days Max Daily Amount: 30 mg       Start Date: 1/9/2022  End Date: 1/12/2022       Order Dose: 5 mg       Quantity: 7 tablet    Refills: 0     No discharge procedures on file  Prior to Admission Medications   Prescriptions Last Dose Informant Patient Reported? Taking?    B Complex Vitamins (VITAMIN B COMPLEX PO)   Yes No   Sig: Take by mouth   Patient not taking: Reported on 12/23/2021    Cholecalciferol 25 MCG (1000 UT) tablet   Yes No   Sig: Take 1,000 Units by mouth   Patient not taking: Reported on 12/23/2021    Multiple Vitamin (MULTIVITAMIN ADULT PO)   Yes No   Sig: Take 1 tablet by mouth   Patient not taking: Reported on 12/23/2021    OMEGA-3 FATTY ACIDS PO   Yes No   Sig: Take 500 mg by mouth   Patient not taking: Reported on 12/23/2021    TURMERIC PO   Yes No   Sig: Take 500 mg by mouth   Patient not taking: Reported on 12/23/2021    albuterol (Ventolin HFA) 90 mcg/act inhaler   No No   Sig: Inhale 2 puffs every 4 (four) hours as needed for wheezing or shortness of breath   Patient not taking: Reported on 12/23/2021    aspirin (ECOTRIN LOW STRENGTH) 81 mg EC tablet   No No   Sig: Take 1 tablet (81 mg total) by mouth daily   atorvastatin (LIPITOR) 40 mg tablet   No No   Sig: Take 1 tablet (40 mg total) by mouth daily with dinner   clopidogrel (PLAVIX) 75 mg tablet   No No   Sig: Take 1 tablet (75 mg total) by mouth daily   lisinopril (ZESTRIL) 5 mg tablet   No No   Sig: Take 1 tablet (5 mg total) by mouth daily   metoprolol succinate (TOPROL-XL) 50 mg 24 hr tablet   No No   Sig: Take 1 tablet (50 mg total) by mouth daily   nicotine (NICODERM CQ) 21 mg/24 hr TD 24 hr patch   No No   Sig: Place 1 patch on the skin every 24 hours   nitroglycerin (NITROSTAT) 0 4 mg SL tablet   No No   Sig: Place 1 tablet (0 4 mg total) under the tongue every 5 (five) minutes as needed for chest pain   predniSONE 10 mg tablet   No No   Sig: 3 tabs BID x 4 days, 2 tabs BID x 3 days, 1 tab BID x 3 days   Patient not taking: Reported on 12/23/2021    sildenafil (VIAGRA) 25 MG tablet   No No   Sig: Take 1 tablet (25 mg total) by mouth daily as needed for erectile dysfunction      Facility-Administered Medications: None       Portions of the record may have been created with voice recognition software  Occasional wrong word or "sound a like" substitutions may have occurred due to the inherent limitations of voice recognition software  Read the chart carefully and recognize, using context, where substitutions have occurred      Electronically signed by:  MD Harris lSoan MD  01/09/22 0493

## 2022-01-09 NOTE — DISCHARGE INSTRUCTIONS
Nondisplaced right 12th rib fracture and multiple displaced right transverse process fracture from L1 to L4  No hematoma , Subcentimeter hypodensities in the spleen most likely represent small cyst or hemangioma    Ultrasound characterization on nonemergent basis is recommended

## 2022-01-09 NOTE — Clinical Note
Elvin Martins was seen and treated in our emergency department on 1/9/2022  Diagnosis:     Abelardo Barrientos  may return to work on return date  He may return on this date: 01/15/2022         If you have any questions or concerns, please don't hesitate to call        Isak Dupont MD    ______________________________           _______________          _______________  Hospital Representative                              Date                                Time

## 2022-01-12 DIAGNOSIS — I25.10 CORONARY ARTERY DISEASE INVOLVING NATIVE CORONARY ARTERY OF NATIVE HEART WITHOUT ANGINA PECTORIS: ICD-10-CM

## 2022-01-12 RX ORDER — METOPROLOL SUCCINATE 50 MG/1
50 TABLET, EXTENDED RELEASE ORAL DAILY
Qty: 30 TABLET | Refills: 6 | Status: SHIPPED | OUTPATIENT
Start: 2022-01-12

## 2022-01-13 ENCOUNTER — OFFICE VISIT (OUTPATIENT)
Dept: URGENT CARE | Facility: CLINIC | Age: 52
End: 2022-01-13
Payer: COMMERCIAL

## 2022-01-13 VITALS
OXYGEN SATURATION: 98 % | SYSTOLIC BLOOD PRESSURE: 141 MMHG | HEART RATE: 84 BPM | TEMPERATURE: 98 F | BODY MASS INDEX: 40.31 KG/M2 | RESPIRATION RATE: 18 BRPM | WEIGHT: 289 LBS | DIASTOLIC BLOOD PRESSURE: 86 MMHG

## 2022-01-13 DIAGNOSIS — S32.009K: Primary | ICD-10-CM

## 2022-01-13 PROCEDURE — 99213 OFFICE O/P EST LOW 20 MIN: CPT

## 2022-01-13 NOTE — LETTER
January 13, 2022     Patient: Molly Gomez   YOB: 1970   Date of Visit: 1/13/2022       To Whom It May Concern: It is my medical opinion that Molly Gomez should remain out of work until cleared by Orthopedic  If you have any questions or concerns, please don't hesitate to call           Sincerely,        ADELSO Marroquin    CC: No Recipients

## 2022-01-13 NOTE — PATIENT INSTRUCTIONS
Rest and avoid heavy lifting  Follow-up with Orthopedic  Try OTC lidoderm patches for pain  Continue the Naprosyn, Take with food to avoid GI upset  Acute Low Back Pain   WHAT YOU NEED TO KNOW:   Acute low back pain is sudden discomfort that lasts up to 6 weeks and makes activity difficult  DISCHARGE INSTRUCTIONS:   Return to the emergency department if:   · You have severe pain  · You have sudden stiffness and heaviness on both buttocks down to both legs  · You have numbness or weakness in one leg, or pain in both legs  · You have numbness in your genital area or across your lower back  · You cannot control your urine or bowel movements  Call your doctor if:   · You have a fever  · You have pain at night or when you rest     · Your pain does not get better with treatment  · You have pain that worsens when you cough or sneeze  · You suddenly feel something pop or snap in your back  · You have questions or concerns about your condition or care  Medicines: You may need any of the following:  · NSAIDs , such as ibuprofen, help decrease swelling, pain, and fever  This medicine is available with or without a doctor's order  NSAIDs can cause stomach bleeding or kidney problems in certain people  If you take blood thinner medicine, always ask your healthcare provider if NSAIDs are safe for you  Always read the medicine label and follow directions  · Acetaminophen  decreases pain and fever  It is available without a doctor's order  Ask how much to take and how often to take it  Follow directions  Read the labels of all other medicines you are using to see if they also contain acetaminophen, or ask your doctor or pharmacist  Acetaminophen can cause liver damage if not taken correctly  Do not use more than 4 grams (4,000 milligrams) total of acetaminophen in one day  · Muscle relaxers  decrease pain by relaxing the muscles in your lower spine      · Prescription pain medicine  may be given  Ask your healthcare provider how to take this medicine safely  Some prescription pain medicines contain acetaminophen  Do not take other medicines that contain acetaminophen without talking to your healthcare provider  Too much acetaminophen may cause liver damage  Prescription pain medicine may cause constipation  Ask your healthcare provider how to prevent or treat constipation  Self-care:   · Stay active  as much as you can without causing more pain  Bed rest could make your back pain worse  Start with some light exercises, such as walking  Avoid heavy lifting until your pain is gone  Ask for more information about the activities or exercises that are right for you  · Apply heat  on your back for 20 to 30 minutes every 2 hours for as many days as directed  Heat helps decrease pain and muscle spasms  Alternate heat and ice  · Apply ice  on your back for 15 to 20 minutes every hour or as directed  Use an ice pack, or put crushed ice in a plastic bag  Cover it with a towel before you apply it to your skin  Ice helps prevent tissue damage and decreases swelling and pain  Prevent acute low back pain:   · Use proper body mechanics  ? Bend at the hips and knees when you  objects  Do not bend from the waist  Use your leg muscles as you lift the load  Do not use your back  Keep the object close to your chest as you lift it  Try not to twist or lift anything above your waist     ? Change your position often when you stand for long periods of time  Rest one foot on a small box or footrest, and then switch to the other foot often  ? Try not to sit for long periods of time  When you do, sit in a straight-backed chair with your feet flat on the floor  Never reach, pull, or push while you are sitting  · Do exercises that strengthen your back muscles  Warm up before you exercise  Ask your healthcare provider the best exercises for you  · Maintain a healthy weight    Ask your healthcare provider what a healthy weight is for you  Ask him or her to help you create a weight loss plan if you are overweight  Follow up with your doctor as directed:  Return for a follow-up visit if you still have pain after 1 to 3 weeks of treatment  You may need to visit an orthopedist if your back pain lasts longer than 12 weeks  Write down your questions so you remember to ask them during your visits  © Copyright [employer] 2021 Information is for End User's use only and may not be sold, redistributed or otherwise used for commercial purposes  All illustrations and images included in CareNotes® are the copyrighted property of A D A M , Inc  or [facility] Katherine Wilkes   The above information is an  only  It is not intended as medical advice for individual conditions or treatments  Talk to your doctor, nurse or pharmacist before following any medical regimen to see if it is safe and effective for you

## 2022-01-13 NOTE — PROGRESS NOTES
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, unspecified fracture morphology, unspecified lumbar vertebral level, subsequent encounter  Ambulatory Referral to Orthopedic Surgery        Referral given to the Orthopedic doctor was specific work restrictions and re-evaluation  Patient Instructions     Rest and avoid heavy lifting  Follow-up with Orthopedic  Try OTC lidoderm patches for pain  Continue the Naprosyn, Take with food to avoid GI upset  Follow up with PCP in 3-5 days  Proceed to  ER if symptoms worsen  Chief Complaint     Chief Complaint   Patient presents with    Back Pain     x 4 days         History of Present Illness       Patient is a 48YOM presenting for a work note after he suffered a fall   He was seen and evaluated in the ER and has multiple lumbar fractures and a single rib fracture  Patient states he needs a work note with restrictions but feels he is unable to go back to work on 2022  Patient was given Oxycodone and Naprosyn  Patient states he stopped taking the oxycodone because he did not like the way it made him feel  Back Pain  Pertinent negatives include no abdominal pain, chest pain, fever, numbness or weakness  Review of Systems   Review of Systems   Constitutional: Negative for chills and fever  Respiratory: Negative for cough, chest tightness and shortness of breath  Cardiovascular: Negative for chest pain  Gastrointestinal: Negative for abdominal pain  Genitourinary: Negative for flank pain  Musculoskeletal: Positive for back pain  Negative for neck pain  Skin: Positive for wound     Neurological: Negative for weakness and numbness  All other systems reviewed and are negative          Current Medications       Current Outpatient Medications:     clopidogrel (PLAVIX) 75 mg tablet, Take 1 tablet (75 mg total) by mouth daily, Disp: 90 tablet, Rfl: 3    lisinopril (ZESTRIL) 5 mg tablet, Take 1 tablet (5 mg total) by mouth daily, Disp: 90 tablet, Rfl: 3    metoprolol succinate (TOPROL-XL) 50 mg 24 hr tablet, Take 1 tablet (50 mg total) by mouth daily, Disp: 30 tablet, Rfl: 6    naproxen (Naprosyn) 500 mg tablet, Take 1 tablet (500 mg total) by mouth 2 (two) times a day as needed for mild pain, Disp: 30 tablet, Rfl: 0    nitroglycerin (NITROSTAT) 0 4 mg SL tablet, Place 1 tablet (0 4 mg total) under the tongue every 5 (five) minutes as needed for chest pain, Disp: 30 tablet, Rfl: 3    sildenafil (VIAGRA) 25 MG tablet, Take 1 tablet (25 mg total) by mouth daily as needed for erectile dysfunction, Disp: 10 tablet, Rfl: 3    albuterol (Ventolin HFA) 90 mcg/act inhaler, Inhale 2 puffs every 4 (four) hours as needed for wheezing or shortness of breath (Patient not taking: Reported on 12/23/2021 ), Disp: 18 g, Rfl: 1    aspirin (ECOTRIN LOW STRENGTH) 81 mg EC tablet, Take 1 tablet (81 mg total) by mouth daily, Disp: 90 tablet, Rfl: 3    atorvastatin (LIPITOR) 40 mg tablet, Take 1 tablet (40 mg total) by mouth daily with dinner, Disp: 90 tablet, Rfl: 3    B Complex Vitamins (VITAMIN B COMPLEX PO), Take by mouth (Patient not taking: Reported on 12/23/2021 ), Disp: , Rfl:     Cholecalciferol 25 MCG (1000 UT) tablet, Take 1,000 Units by mouth (Patient not taking: Reported on 12/23/2021 ), Disp: , Rfl:     Multiple Vitamin (MULTIVITAMIN ADULT PO), Take 1 tablet by mouth (Patient not taking: Reported on 12/23/2021 ), Disp: , Rfl:     nicotine (NICODERM CQ) 21 mg/24 hr TD 24 hr patch, Place 1 patch on the skin every 24 hours (Patient not taking: Reported on 1/13/2022 ), Disp: 28 patch, Rfl: 0    OMEGA-3 FATTY ACIDS PO, Take 500 mg by mouth (Patient not taking: Reported on 12/23/2021 ), Disp: , Rfl:     predniSONE 10 mg tablet, 3 tabs BID x 4 days, 2 tabs BID x 3 days, 1 tab BID x 3 days (Patient not taking: Reported on 12/23/2021 ), Disp: 42 tablet, Rfl: 0    TURMERIC PO, Take 500 mg by mouth (Patient not taking: Reported on 12/23/2021 ), Disp: , Rfl:     Current Allergies     Allergies as of 01/13/2022 - Reviewed 01/13/2022   Allergen Reaction Noted    Bupropion Itching 01/21/2019            The following portions of the patient's history were reviewed and updated as appropriate: allergies, current medications, past family history, past medical history, past social history, past surgical history and problem list      Past Medical History:   Diagnosis Date    Heart attack ([facility] [address] ) 06/28/2021       Past Surgical History:   Procedure Laterality Date    CORONARY ANGIOPLASTY      FINGER FRACTURE SURGERY      MOUTH SURGERY         Family History   Problem Relation Age of Onset    Hypertension Mother     Breast cancer Mother     Heart attack Father     Hyperlipidemia Father     Hyperlipidemia Sister     Heart disease Maternal Grandmother     Heart disease Maternal Grandfather     Heart attack Maternal Grandfather          Medications have been verified  Objective   /86   Pulse 84   Temp 98 °F (36 7 °C)   Resp 18   Wt 131 kg (289 lb)   SpO2 98%   BMI 40 31 kg/m²        Physical Exam     Physical Exam  Vitals reviewed  Constitutional:       Appearance: Normal appearance  Cardiovascular:      Rate and Rhythm: Normal rate and regular rhythm  Pulmonary:      Effort: Pulmonary effort is normal       Breath sounds: Normal breath sounds  Skin:     Findings: Bruising present  Neurological:      Mental Status: He is alert and oriented to person, place, and time  Motor: No weakness  Gait: Gait is intact

## 2022-01-14 ENCOUNTER — TELEPHONE (OUTPATIENT)
Dept: OBGYN CLINIC | Facility: HOSPITAL | Age: 52
End: 2022-01-14

## 2022-01-15 ENCOUNTER — OFFICE VISIT (OUTPATIENT)
Dept: OBGYN CLINIC | Facility: CLINIC | Age: 52
End: 2022-01-15
Payer: COMMERCIAL

## 2022-01-15 VITALS
WEIGHT: 285 LBS | BODY MASS INDEX: 39.9 KG/M2 | DIASTOLIC BLOOD PRESSURE: 80 MMHG | HEIGHT: 71 IN | SYSTOLIC BLOOD PRESSURE: 120 MMHG

## 2022-01-15 DIAGNOSIS — S22.31XA CLOSED FRACTURE OF ONE RIB OF RIGHT SIDE, INITIAL ENCOUNTER: ICD-10-CM

## 2022-01-15 DIAGNOSIS — S32.009A CLOSED FRACTURE OF TRANSVERSE PROCESS OF LUMBAR VERTEBRA, INITIAL ENCOUNTER (HCC): Primary | ICD-10-CM

## 2022-01-15 PROCEDURE — 99204 OFFICE O/P NEW MOD 45 MIN: CPT | Performed by: FAMILY MEDICINE

## 2022-01-15 NOTE — LETTER
January 15, 2022     Patient: Rohit Polk   YOB: 1970   Date of Visit: 1/15/2022       To Whom it May Concern:    Rohit Polk is under my professional care  He was seen in my office on 1/15/2022  Recommend against work at this time  Patient to be re-evaluated on or about 01/29/2022  If you have any questions or concerns, please don't hesitate to call           Sincerely,          Kal Portillo III, DO        CC: Rohit Wynner

## 2022-01-15 NOTE — PROGRESS NOTES
1  Closed fracture of transverse process of lumbar vertebra, initial encounter ([facility] [address] )  Respiratory Therapy Supplies (Spirometer) KIT   2  Closed fracture of one rib of right side, initial encounter  Respiratory Therapy Supplies (Spirometer) KIT     No orders of the defined types were placed in this encounter  IMAGING STUDIES: (I personally reviewed images in PACS and report):  CT Abdomen pelvis with contrast 1/9/22:  Nondisplaced right 12th rib fracture and multiple displaced right transverse process fracture from L1 to L4  No hematoma      Subcentimeter hypodensities in the spleen most likely represent small cyst or hemangioma  Ultrasound characterization on nonemergent basis is recommended  PAST REPORTS:        ASSESSMENT/PLAN:  Right 12th rib fracture  L1-4 displaced transverse process fractures  DOI: 1/9/22  FUI: 6 days    Repeat X-ray next visit: None    Return in about 2 weeks (around 1/29/2022)  Patient Instructions   Recommended non operative treatment for his fractures  Explained risk of pneumonia from not taking deep breaths and have instructed patient to actively practice deep breaths throughout the day to clear his lungs completely  I provided patient with no to recommend against work at this time  Reviewed red flags to include any development of incontinence, numbness down the arms or legs or weakness if this occurs to contact physician immediately or go to the emergency department  Recommended home spirometry for breathing exercises four times a day            __________________________________________________________________________    CHIEF COMPLAINT:  Back pain, rib pain    HPI:  Pamela Tipton is a 54 y o  male  who presents for       Visit 1/15/22:  Patient presents for evaluation of back and rib pain 6 days status post injury when he was walking out of his front door at his home stepped on the ice and landed on the side right side of his back    He was evaluated emergency department where he was found to multiple transverse process fractures of lumbar spine as well as a right rib fracture  Has mild pain exacerbated by sneezing coughing with moderate intensity at that time  Mild pain on deep inhalation right side  Pain also exacerbated by standing for long periods of time  He does have radiation of pain towards the right upper thigh when taking long strides other than that he denies any groin pain denies any pain below the knee into the foot  Denies any incontinence  Denies any saddle anesthesia  Denies any numbness of legs  Review of Systems   Constitutional: Negative for chills, fever and unexpected weight change  HENT: Negative for hearing loss, nosebleeds and sore throat  Eyes: Negative for pain, redness and visual disturbance  Respiratory: Negative for cough, shortness of breath and wheezing  Cardiovascular: Negative for chest pain, palpitations and leg swelling  Gastrointestinal: Negative for abdominal distention, nausea and vomiting  Endocrine: Negative for polydipsia and polyuria  Genitourinary: Negative for dysuria and hematuria  Skin: Negative for rash and wound  Neurological: Negative for dizziness, numbness and headaches  Psychiatric/Behavioral: Negative for decreased concentration and suicidal ideas           Following history reviewed and update:    Past Medical History:   Diagnosis Date    Heart attack ([facility] [address] ) 06/28/2021     Past Surgical History:   Procedure Laterality Date    CORONARY ANGIOPLASTY      FINGER FRACTURE SURGERY      MOUTH SURGERY       Social History   Social History     Substance and Sexual Activity   Alcohol Use Not Currently     Social History     Substance and Sexual Activity   Drug Use Not Currently    Types: Marijuana    Comment: used once last week     Social History     Tobacco Use   Smoking Status Former Smoker    Packs/day: 2 00    Years: 22 00    Pack years: 44 00    Quit date: 6/28/2021    Years since quittin 5   Smokeless Tobacco Never Used     Family History   Problem Relation Age of Onset    Hypertension Mother    Charisse Mejias Breast cancer Mother     Heart attack Father     Hyperlipidemia Father     Hyperlipidemia Sister     Heart disease Maternal Grandmother     Heart disease Maternal Grandfather     Heart attack Maternal Grandfather      Allergies   Allergen Reactions    Bupropion Itching          Physical Exam  /80   Ht 5' 11" (1 803 m)   Wt 129 kg (285 lb)   BMI 39 75 kg/m²     Constitutional:  see vital signs  Gen: well-developed, normocephalic/atraumatic, well-groomed  Eyes: No inflammation or discharge of conjunctiva or lids; sclera clear   Pharynx: no inflammation, lesion, or mass of lips  Neck: supple, no masses, non-distended  MSK: no inflammation, lesion, mass, or clubbing of nails and digits except for other than mentioned below  SKIN: no visible rashes or skin lesions  Pulmonary/Chest: Effort normal  No respiratory distress     NEURO: cranial nerves grossly intact  PSYCH:  Alert and oriented to person, place, and time; recent and remote memory intact; mood normal, no depression, anxiety, or agitation, judgment and insight good and intact     Ortho Exam  BACK EXAM:  Gait: normal, no trendelenberg gait, no antalgic gait    BACK TENDERNESS:  Spinous Processes: lumbar diffuse and right posterior ribs distal  Paraspinal Muscles: no  SI Joint: no  Sacrum: no    ROM:  Flexion: intact  Extension: intact  Sidebending: intact    DERMATOMAL SENSATION:  L1: normal   L2: normal   L3: normal   L4: normal   L5: normal   S1: normal    STRENGTH (bilateral):  Knee Extension: 5/5  Knee Flexion: 5/5  Foot Dorsiflexion: 5/5  Great Toe Extension: 5/5  Foot Plantarflexion: 5/5  Hip Flexion: 5/5  Hip Abduction: 5/5    REFLEXES:  Patellar: 2+ bilateral  Achilles: 2+ bilateral  Clonus: negative bilateral    BACK:   SUPINE STRAIGHT LEG: negative  PRONE STRAIGHT LEG:  SLUMP: negative    RIGHT HIP:  LOG ROLL: negative  OWEN: negative  FADIR: negative    LEFT HIP:  LOG ROLL: negative  OWEN: negative  FADIR: negative      __________________________________________________________________________  Procedures

## 2022-01-15 NOTE — PATIENT INSTRUCTIONS
Recommended non operative treatment for his fractures  Explained risk of pneumonia from not taking deep breaths and have instructed patient to actively practice deep breaths throughout the day to clear his lungs completely  I provided patient with no to recommend against work at this time  Reviewed red flags to include any development of incontinence, numbness down the arms or legs or weakness if this occurs to contact physician immediately or go to the emergency department  Recommended home spirometry for breathing exercises four times a day

## 2022-02-02 ENCOUNTER — OFFICE VISIT (OUTPATIENT)
Dept: URGENT CARE | Facility: CLINIC | Age: 52
End: 2022-02-02
Payer: COMMERCIAL

## 2022-02-02 VITALS
DIASTOLIC BLOOD PRESSURE: 93 MMHG | OXYGEN SATURATION: 97 % | HEART RATE: 78 BPM | SYSTOLIC BLOOD PRESSURE: 143 MMHG | RESPIRATION RATE: 18 BRPM | TEMPERATURE: 97.7 F

## 2022-02-02 DIAGNOSIS — K04.7 DENTAL ABSCESS: Primary | ICD-10-CM

## 2022-02-02 PROCEDURE — 99213 OFFICE O/P EST LOW 20 MIN: CPT

## 2022-02-02 RX ORDER — CLINDAMYCIN HYDROCHLORIDE 300 MG/1
300 CAPSULE ORAL 3 TIMES DAILY
Qty: 21 CAPSULE | Refills: 0 | Status: SHIPPED | OUTPATIENT
Start: 2022-02-02 | End: 2022-02-09

## 2022-02-02 RX ORDER — CHLORHEXIDINE GLUCONATE 0.12 MG/ML
15 RINSE ORAL 2 TIMES DAILY
Qty: 120 ML | Refills: 0 | Status: SHIPPED | OUTPATIENT
Start: 2022-02-02

## 2022-02-02 RX ORDER — NAPROXEN 500 MG/1
500 TABLET ORAL 2 TIMES DAILY PRN
Qty: 21 TABLET | Refills: 0 | Status: SHIPPED | OUTPATIENT
Start: 2022-02-02 | End: 2022-06-12 | Stop reason: ALTCHOICE

## 2022-02-02 NOTE — PROGRESS NOTES
[facility]        NAME: Carmen Arce is a 54 y o  male  : 1970    MRN: [id number]  DATE: 2022  TIME: 3:13 PM      Assessment and Plan     Dental abscess [K04 7]  1  Dental abscess  chlorhexidine (PERIDEX) 0 12 % solution    clindamycin (CLEOCIN) 300 MG capsule    naproxen (Naprosyn) 500 mg tablet         Patient Instructions     Take antibiotic as prescribed  Recommend probiotic use while taking antibiotic  Take naproxen as prescribed, as needed with food  Use mouth wash as prescribed  Acetaminophen  for fever and pain  Follow-up with PCP in 3-5 days  Go to ER if symptoms worsen  Recommend to call a dentist to schedule a follow-up appointment once your dental abscess improves  Chief Complaint     Chief Complaint   Patient presents with    Jaw Pain     approx  2 wks ago; started with left inner ear pain, left jaw pain, left under tongue pain 8/10 pain noted  Foul drainage noted in mouth/throat which is draining into throat and is yellowish in color  Also foul odor coming from mouth drainage  Left neck/jaw area looks to be swollen  History of Present Illness     Patient is a 61-year-old male who presents with left ear pain, left jaw pain, and pain under left side of tongue  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  States he ended up with a laceration on the outside of his left ear about a month ago  States he has been having pain on the inside of his left jaw for two weeks  States the pain started in his left ear and goes into his left jaw  States there is sac where his wisdom teeth use to be  States pain is 5/10 at this time  States under the left side of his tongue hurts and feels like it is oozing  States trouble swallowing and eating due to pain  Able to tolerate oral fluids and secretions  Denies fever or chills  Denies N/V  States he has been taking tylenol for pain  Denies DM history  Patient states he does not have any teeth   States he had all of his teeth taken out after an accident when he was 27years old  States he had dentures for 3 days and couldn't stand them  Review of Systems     Review of Systems   Constitutional: Negative for chills and fever (reports fever four days ago, 101)  HENT: Positive for congestion and ear pain  Negative for rhinorrhea, sore throat and voice change  Respiratory: Negative for shortness of breath  Cardiovascular: Negative for chest pain  Gastrointestinal: Positive for diarrhea and nausea (intermittent)  Negative for vomiting  Musculoskeletal: Positive for back pain (states he broke his back 3 weeks ago)  All other systems reviewed and are negative          Current Medications       Current Outpatient Medications:     aspirin (ECOTRIN LOW STRENGTH) 81 mg EC tablet, Take 1 tablet (81 mg total) by mouth daily, Disp: 90 tablet, Rfl: 3    atorvastatin (LIPITOR) 40 mg tablet, Take 1 tablet (40 mg total) by mouth daily with dinner, Disp: 90 tablet, Rfl: 3    clopidogrel (PLAVIX) 75 mg tablet, Take 1 tablet (75 mg total) by mouth daily, Disp: 90 tablet, Rfl: 3    lisinopril (ZESTRIL) 5 mg tablet, Take 1 tablet (5 mg total) by mouth daily, Disp: 90 tablet, Rfl: 3    metoprolol succinate (TOPROL-XL) 50 mg 24 hr tablet, Take 1 tablet (50 mg total) by mouth daily, Disp: 30 tablet, Rfl: 6    naproxen (Naprosyn) 500 mg tablet, Take 1 tablet (500 mg total) by mouth 2 (two) times a day as needed for mild pain, Disp: 30 tablet, Rfl: 0    albuterol (Ventolin HFA) 90 mcg/act inhaler, Inhale 2 puffs every 4 (four) hours as needed for wheezing or shortness of breath (Patient not taking: Reported on 12/23/2021 ), Disp: 18 g, Rfl: 1    B Complex Vitamins (VITAMIN B COMPLEX PO), Take by mouth (Patient not taking: Reported on 12/23/2021 ), Disp: , Rfl:     chlorhexidine (PERIDEX) 0 12 % solution, Apply 15 mL to the mouth or throat 2 (two) times a day, Disp: 120 mL, Rfl: 0    Cholecalciferol 25 MCG (1000 UT) tablet, Take 1,000 Units by mouth (Patient not taking: Reported on 12/23/2021 ), Disp: , Rfl:     clindamycin (CLEOCIN) 300 MG capsule, Take 1 capsule (300 mg total) by mouth 3 (three) times a day for 7 days, Disp: 21 capsule, Rfl: 0    Multiple Vitamin (MULTIVITAMIN ADULT PO), Take 1 tablet by mouth (Patient not taking: Reported on 12/23/2021 ), Disp: , Rfl:     naproxen (Naprosyn) 500 mg tablet, Take 1 tablet (500 mg total) by mouth 2 (two) times a day as needed for mild pain, Disp: 21 tablet, Rfl: 0    nicotine (NICODERM CQ) 21 mg/24 hr TD 24 hr patch, Place 1 patch on the skin every 24 hours (Patient not taking: Reported on 1/13/2022 ), Disp: 28 patch, Rfl: 0    nitroglycerin (NITROSTAT) 0 4 mg SL tablet, Place 1 tablet (0 4 mg total) under the tongue every 5 (five) minutes as needed for chest pain (Patient not taking: Reported on 2/2/2022 ), Disp: 30 tablet, Rfl: 3    OMEGA-3 FATTY ACIDS PO, Take 500 mg by mouth (Patient not taking: Reported on 12/23/2021 ), Disp: , Rfl:     predniSONE 10 mg tablet, 3 tabs BID x 4 days, 2 tabs BID x 3 days, 1 tab BID x 3 days (Patient not taking: Reported on 12/23/2021 ), Disp: 42 tablet, Rfl: 0    Respiratory Therapy Supplies (Spirometer) KIT, Use 4 (four) times a day (Patient not taking: Reported on 2/2/2022 ), Disp: 1 kit, Rfl: 0    sildenafil (VIAGRA) 25 MG tablet, Take 1 tablet (25 mg total) by mouth daily as needed for erectile dysfunction (Patient not taking: Reported on 2/2/2022 ), Disp: 10 tablet, Rfl: 3    TURMERIC PO, Take 500 mg by mouth (Patient not taking: Reported on 12/23/2021 ), Disp: , Rfl:     Current Allergies     Allergies as of 02/02/2022 - Reviewed 02/02/2022   Allergen Reaction Noted    Bupropion Itching 01/21/2019              The following portions of the patient's history were reviewed and updated as appropriate: allergies, current medications, past family history, past medical history, past social history, past surgical history and problem list      Past Medical History:   Diagnosis Date    Heart attack ([facility] [address] ) 06/28/2021       Past Surgical History:   Procedure Laterality Date    CORONARY ANGIOPLASTY      FINGER FRACTURE SURGERY      MOUTH SURGERY         Family History   Problem Relation Age of Onset    Hypertension Mother     Breast cancer Mother     Heart attack Father     Hyperlipidemia Father     Hyperlipidemia Sister     Heart disease Maternal Grandmother     Heart disease Maternal Grandfather     Heart attack Maternal Grandfather          Medications have been verified  Objective     /93   Pulse 78   Temp 97 7 °F (36 5 °C)   Resp 18   SpO2 97%   No LMP for male patient  Physical Exam     Physical Exam  Vitals and nursing note reviewed  Constitutional:       General: He is awake  He is not in acute distress  Appearance: Normal appearance  He is not ill-appearing, toxic-appearing or diaphoretic  HENT:      Head:      Jaw: No tenderness, swelling or pain on movement  Comments: No mastoid tenderness     Right Ear: Tympanic membrane, ear canal and external ear normal  Tympanic membrane is not injected or erythematous  Left Ear: Tympanic membrane, ear canal and external ear normal  Tympanic membrane is not injected or erythematous  Nose: No mucosal edema, congestion or rhinorrhea  Mouth/Throat:      Lips: Pink  Mouth: Mucous membranes are moist       Dentition: Abnormal dentition  Does not have dentures  Gingival swelling and dental abscesses (Redness and swelling present where left wisdom tooth would be located consistent with abscess  Visible small pocket of pus  ) present  Tongue: Lesions (canker sore located on left underside of tongue) present  Pharynx: Oropharynx is clear  Uvula midline  No pharyngeal swelling, oropharyngeal exudate, posterior oropharyngeal erythema or uvula swelling  Cardiovascular:      Rate and Rhythm: Normal rate  Pulses: Normal pulses        Heart sounds: Normal heart sounds, S1 normal and S2 normal  No murmur heard  Pulmonary:      Effort: Pulmonary effort is normal  No respiratory distress  Breath sounds: Normal breath sounds and air entry  No stridor, decreased air movement or transmitted upper airway sounds  Musculoskeletal:      Cervical back: Neck supple  Lymphadenopathy:      Cervical: Cervical adenopathy (left over right) present  Skin:     General: Skin is warm  Capillary Refill: Capillary refill takes less than 2 seconds  Neurological:      Mental Status: He is alert  Psychiatric:         Mood and Affect: Mood normal          Behavior: Behavior normal          Thought Content:  Thought content normal          Judgment: Judgment normal

## 2022-02-02 NOTE — PATIENT INSTRUCTIONS
Take antibiotic as prescribed  Recommend probiotic use while taking antibiotic  Take naproxen as prescribed, as needed with food  Use mouth wash as prescribed  Acetaminophen  for fever and pain  Follow-up with PCP in 3-5 days  Go to ER if symptoms worsen  Recommend to call a dentist to schedule a follow-up appointment once your dental abscess improves  Dental Abscess   AMBULATORY CARE:   A dental abscess  is a collection of pus in or around a tooth  A dental abscess is caused by bacteria  The bacteria can enter the tooth when the enamel (outer part of the tooth) is damaged by tooth decay  Bacteria can also enter the tooth through a chip in the tooth or a cut in the gum  Food particles that are stuck between the teeth for a long time may also lead to an abscess  Common signs and symptoms:   · Toothache, a loose tooth, or a tooth that is very sensitive to pressure or temperature    · Bad breath, unpleasant taste, and drooling    · Fever    · Pain, redness, and swelling of the gums, or swelling of your face and neck    · Pain when you open or close your mouth    · Trouble opening your mouth    Seek care immediately if:   · You have severe pain in your tooth or jaw  · You have trouble breathing because of pain or swelling  Call your doctor if:   · Your symptoms get worse, even after treatment  · Your mouth is bleeding  · You cannot eat or drink because of pain or swelling  · Your abscess returns  · You have an injury that causes a crack in your tooth  · You have questions or concerns about your condition or care  Treatment:  You may  need any of the following:  · Medicines  may be given to treat a bacterial infection and decrease pain  · Incision and drainage  is a cut in the abscess to allow the pus to drain  A sample of fluid may be collected from your abscess  The fluid is sent to a lab and tested for bacteria  Ask your healthcare provider for more information      · A root canal is a procedure to remove the bacteria and prevent more infection  It is usually done after an incision and drainage  A filling or crown will be placed over the tooth after you have healed from your root canal      · Tooth removal  may be needed if the infection affects deeper tissues  This is usually done after an incision and drainage  Self-care:   · Rinse your mouth every 2 hours with salt water  This will help keep the area clean  · Gently brush your teeth twice a day with a soft tooth brush  This will help keep the area clean  · Eat soft foods as directed  Soft foods may cause less pain  Examples include applesauce, yogurt, and cooked pasta  Ask your healthcare provider how long to follow this instruction  · Apply a warm compress to your tooth or gum  Use a cotton ball or gauze soaked in warm water  Remove the compress in 10 minutes or when it becomes cool  Repeat 3 times a day  Prevent another abscess:   · Brush your teeth at least 2 times a day  with fluoride toothpaste  · Use dental floss at least once a day  to clean between your teeth  · Rinse your mouth with water or mouthwash  after meals and snacks  Chew sugarless gum  · Avoid sugary and starchy food that can stick between your teeth  Limit drinks high in sugar, such as soda or fruit juice  · See your dentist every 6 months  for dental cleanings and oral exams  Follow up with your doctor or dentist as directed: Your healthcare provider will need to check your teeth and gums  Write down your questions so you remember to ask them during your visits  © [place]  2021 Information is for End User's use only and may not be sold, redistributed or otherwise used for commercial purposes  All illustrations and images included in CareNotes® are the copyrighted property of [employer]  or [facility] Katherine Lo  The above information is an  only   It is not intended as medical advice for individual conditions or treatments  Talk to your doctor, nurse or pharmacist before following any medical regimen to see if it is safe and effective for you

## 2022-02-14 ENCOUNTER — OFFICE VISIT (OUTPATIENT)
Dept: OBGYN CLINIC | Facility: CLINIC | Age: 52
End: 2022-02-14
Payer: COMMERCIAL

## 2022-02-14 VITALS
SYSTOLIC BLOOD PRESSURE: 140 MMHG | BODY MASS INDEX: 42 KG/M2 | HEIGHT: 71 IN | WEIGHT: 300 LBS | DIASTOLIC BLOOD PRESSURE: 80 MMHG

## 2022-02-14 DIAGNOSIS — S22.31XA CLOSED FRACTURE OF ONE RIB OF RIGHT SIDE, INITIAL ENCOUNTER: ICD-10-CM

## 2022-02-14 DIAGNOSIS — S32.009A CLOSED FRACTURE OF TRANSVERSE PROCESS OF LUMBAR VERTEBRA, INITIAL ENCOUNTER (HCC): Primary | ICD-10-CM

## 2022-02-14 PROCEDURE — 99213 OFFICE O/P EST LOW 20 MIN: CPT | Performed by: FAMILY MEDICINE

## 2022-02-14 NOTE — LETTER
February 14, 2022     Patient: Eleanor Bradford   YOB: 1970   Date of Visit: 2/14/2022       To Whom it May Concern:    Eleanor Bradford is under my professional care  He was seen in my office on 2/14/2022  Recommend return to work full duty no restrictions on 02/15/2022  If you have any questions or concerns, please don't hesitate to call           Sincerely,          Soumya Gómez III, DO        CC: Eleanor Jal

## 2022-02-14 NOTE — PATIENT INSTRUCTIONS
Explained the patient that his fractures are improving but not fully healed at this time  He is to expect intermittent pain for approximately 3 months from his initial injury event  He may return to work as tolerated full duty no restrictions  May start exercises  Lower Back Exercises   WHAT YOU NEED TO KNOW:   Lower back exercises help heal and strengthen your back muscles to prevent another injury  Ask your healthcare provider if you need to see a physical therapist for more advanced exercises  DISCHARGE INSTRUCTIONS:   Return to the emergency department if:   · You have severe pain that prevents you from moving  Contact your healthcare provider if:   · Your pain becomes worse  · You have new pain  · You have questions or concerns about your condition or care  Do lower back exercises safely:   · Do the exercises on a mat or firm surface  (not on a bed) to support your spine and prevent low back pain  · Move slowly and smoothly  Avoid fast or jerky motions  · Breathe normally  Do not hold your breath  · Stop if you feel pain  It is normal to feel some discomfort at first  Regular exercise will help decrease your discomfort over time  Lower back exercises: Your healthcare provider may recommend that you do back exercises 10 to 30 minutes each day  He may also recommend that you do exercises 1 to 3 times each day  Ask your healthcare provider which exercises are best for you and how often to do them  · Ankle pumps:  Lie on your back  Move your foot up (with your toes pointing toward your head)  Then, move your foot down (with your toes pointing away from you)  Repeat this exercise 10 times on each side  · Heel slides:  Lie on your back  Slowly bend one leg and then straighten it  Next, bend the other leg and then straighten it  Repeat 10 times on each side  · Pelvic tilt:  Lie on your back with your knees bent and feet flat on the floor   Place your arms in a relaxed position beside your body  Tighten the muscles of your abdomen and flatten your back against the floor  Hold for 5 seconds  Repeat 5 times  · Back stretch:  Lie on your back with your hands behind your head  Bend your knees and turn the lower half of your body to one side  Hold this position for 10 seconds  Repeat 3 times on each side  · Straight leg raises:  Lie on your back with one leg straight  Bend the other knee  Tighten your abdomen and then slowly lift the straight leg up about 6 to 12 inches off the floor  Hold for 1 to 5 seconds  Lower your leg slowly  Repeat 10 times on each leg  · Knee-to-chest:  Lie on your back with your knees bent and feet flat on the floor  Pull one of your knees toward your chest and hold it there for 5 seconds  Return your leg to the starting position  Lift the other knee toward your chest and hold for 5 seconds  Do this 5 times on each side  · Cat and camel:  Place your hands and knees on the floor  Arch your back upward toward the ceiling and lower your head  Round out your spine as much as you can  Hold for 5 seconds  Lift your head upward and push your chest downward toward the floor  Hold for 5 seconds  Do 3 sets or as directed  · Wall squats:  Stand with your back against a wall  Tighten the muscles of your abdomen  Slowly lower your body until your knees are bent at a 45 degree angle  Hold this position for 5 seconds  Slowly move back up to a standing position  Repeat 10 times  · Curl up:  Lie on your back with your knees bent and feet flat on the floor  Place your hands, palms down, underneath the curve in your lower back  Next, with your elbows on the floor, lift your shoulders and chest 2 to 3 inches  Keep your head in line with your shoulders  Hold this position for 5 seconds  When you can do this exercise without pain for 10 to 15 seconds, you may add a rotation   While your shoulders and chest are lifted off the ground, turn slightly to the left and hold  Repeat on the other side  · Bird dog:  Place your hands and knees on the floor  Keep your wrists directly below your shoulders and your knees directly below your hips  Pull your belly button in toward your spine  Do not flatten or arch your back  Tighten your abdominal muscles  Raise one arm straight out so that it is aligned with your head  Next, raise the leg opposite your arm  Hold this position for 15 seconds  Lower your arm and leg slowly and change sides  Do 5 sets  © Copyright [employer] 2021 Information is for End User's use only and may not be sold, redistributed or otherwise used for commercial purposes  All illustrations and images included in CareNotes® are the copyrighted property of A D A M , Inc  or [employer]  The above information is an  only  It is not intended as medical advice for individual conditions or treatments  Talk to your doctor, nurse or pharmacist before following any medical regimen to see if it is safe and effective for you

## 2022-02-14 NOTE — PROGRESS NOTES
1  Closed fracture of transverse process of lumbar vertebra, initial encounter ([facility] [address] )     2  Closed fracture of one rib of right side, initial encounter       No orders of the defined types were placed in this encounter  IMAGING STUDIES: (I personally reviewed images in PACS and report):         PAST REPORTS:  CT Abdomen 1/9/22:  Nondisplaced right 12th rib fracture and multiple displaced right transverse process fracture from L1 to L4  No hematoma  Subcentimeter hypodensities in the spleen most likely represent small cyst or hemangioma  Ultrasound characterization on nonemergent basis is recommended  ASSESSMENT/PLAN:  Right 12th rib fracture  L1-4 displaced transverse process fractures  DOI: 1/9/22  FUI: 5 weeks 1 day      Repeat X-ray next visit: None    Return in about 6 weeks (around 3/28/2022)  Patient Instructions     Explained the patient that his fractures are improving but not fully healed at this time  He is to expect intermittent pain for approximately 3 months from his initial injury event  He may return to work as tolerated full duty no restrictions  May start exercises  Lower Back Exercises   WHAT YOU NEED TO KNOW:   Lower back exercises help heal and strengthen your back muscles to prevent another injury  Ask your healthcare provider if you need to see a physical therapist for more advanced exercises  DISCHARGE INSTRUCTIONS:   Return to the emergency department if:   · You have severe pain that prevents you from moving  Contact your healthcare provider if:   · Your pain becomes worse  · You have new pain  · You have questions or concerns about your condition or care  Do lower back exercises safely:   · Do the exercises on a mat or firm surface  (not on a bed) to support your spine and prevent low back pain  · Move slowly and smoothly  Avoid fast or jerky motions  · Breathe normally  Do not hold your breath  · Stop if you feel pain    It is normal to feel some discomfort at first  Regular exercise will help decrease your discomfort over time  Lower back exercises: Your healthcare provider may recommend that you do back exercises 10 to 30 minutes each day  He may also recommend that you do exercises 1 to 3 times each day  Ask your healthcare provider which exercises are best for you and how often to do them  · Ankle pumps:  Lie on your back  Move your foot up (with your toes pointing toward your head)  Then, move your foot down (with your toes pointing away from you)  Repeat this exercise 10 times on each side  · Heel slides:  Lie on your back  Slowly bend one leg and then straighten it  Next, bend the other leg and then straighten it  Repeat 10 times on each side  · Pelvic tilt:  Lie on your back with your knees bent and feet flat on the floor  Place your arms in a relaxed position beside your body  Tighten the muscles of your abdomen and flatten your back against the floor  Hold for 5 seconds  Repeat 5 times  · Back stretch:  Lie on your back with your hands behind your head  Bend your knees and turn the lower half of your body to one side  Hold this position for 10 seconds  Repeat 3 times on each side  · Straight leg raises:  Lie on your back with one leg straight  Bend the other knee  Tighten your abdomen and then slowly lift the straight leg up about 6 to 12 inches off the floor  Hold for 1 to 5 seconds  Lower your leg slowly  Repeat 10 times on each leg  · Knee-to-chest:  Lie on your back with your knees bent and feet flat on the floor  Pull one of your knees toward your chest and hold it there for 5 seconds  Return your leg to the starting position  Lift the other knee toward your chest and hold for 5 seconds  Do this 5 times on each side  · Cat and camel:  Place your hands and knees on the floor  Arch your back upward toward the ceiling and lower your head  Round out your spine as much as you can  Hold for 5 seconds  Lift your head upward and push your chest downward toward the floor  Hold for 5 seconds  Do 3 sets or as directed  · Wall squats:  Stand with your back against a wall  Tighten the muscles of your abdomen  Slowly lower your body until your knees are bent at a 45 degree angle  Hold this position for 5 seconds  Slowly move back up to a standing position  Repeat 10 times  · Curl up:  Lie on your back with your knees bent and feet flat on the floor  Place your hands, palms down, underneath the curve in your lower back  Next, with your elbows on the floor, lift your shoulders and chest 2 to 3 inches  Keep your head in line with your shoulders  Hold this position for 5 seconds  When you can do this exercise without pain for 10 to 15 seconds, you may add a rotation  While your shoulders and chest are lifted off the ground, turn slightly to the left and hold  Repeat on the other side  · Bird dog:  Place your hands and knees on the floor  Keep your wrists directly below your shoulders and your knees directly below your hips  Pull your belly button in toward your spine  Do not flatten or arch your back  Tighten your abdominal muscles  Raise one arm straight out so that it is aligned with your head  Next, raise the leg opposite your arm  Hold this position for 15 seconds  Lower your arm and leg slowly and change sides  Do 5 sets  © Copyright [employer] 2021 Information is for End User's use only and may not be sold, redistributed or otherwise used for commercial purposes  All illustrations and images included in CareNotes® are the copyrighted property of A D A M , Inc  or [facility]   The above information is an  only  It is not intended as medical advice for individual conditions or treatments   Talk to your doctor, nurse or pharmacist before following any medical regimen to see if it is safe and effective for you           __________________________________________________________________________    CHIEF COMPLAINT:  F/u lumbar    HPI:  Mariely Dumont is a 54 y o  male  who presents for       Visit 22:  F/u lumbar spine injury: 90% overall  Improving  Greatly improving last 2 weeks  Denies any numbness and tingling  Denies any pain radiating down the legs  Does have pain in the right lower back region lumbar  Feels ready return to work  Review of Systems   Constitutional: Negative for chills and fever  Neurological: Negative for weakness           Following history reviewed and update:    Past Medical History:   Diagnosis Date    Heart attack ([facility] [address] ) 2021     Past Surgical History:   Procedure Laterality Date    CORONARY ANGIOPLASTY      FINGER FRACTURE SURGERY      MOUTH SURGERY       Social History   Social History     Substance and Sexual Activity   Alcohol Use Not Currently     Social History     Substance and Sexual Activity   Drug Use Not Currently    Types: Marijuana    Comment: used once last week     Social History     Tobacco Use   Smoking Status Former Smoker    Packs/day: 2 00    Years: 22 00    Pack years: 44 00    Quit date: 2021    Years since quittin 6   Smokeless Tobacco Never Used     Family History   Problem Relation Age of Onset    Hypertension Mother     Breast cancer Mother     Heart attack Father     Hyperlipidemia Father     Hyperlipidemia Sister     Heart disease Maternal Grandmother     Heart disease Maternal Grandfather     Heart attack Maternal Grandfather      Allergies   Allergen Reactions    Bupropion Itching          Physical Exam  /80   Ht 5' 11" (1 803 m)   Wt 136 kg (300 lb)   BMI 41 84 kg/m²     Constitutional:  see vital signs  Gen: well-developed, normocephalic/atraumatic, well-groomed  Eyes: No inflammation or discharge of conjunctiva or lids; sclera clear   Pharynx: no inflammation, lesion, or mass of lips  Neck: supple, no masses, non-distended  MSK: no inflammation, lesion, mass, or clubbing of nails and digits except for other than mentioned below  SKIN: no visible rashes or skin lesions  Pulmonary/Chest: Effort normal  No respiratory distress     NEURO: cranial nerves grossly intact  PSYCH:  Alert and oriented to person, place, and time; recent and remote memory intact; mood normal, no depression, anxiety, or agitation, judgment and insight good and intact     Ortho Exam    BACK EXAM:  Gait: normal, no trendelenberg gait, no antalgic gait    BACK TENDERNESS:  Spinous Processes:  L2-3 4  Paraspinal Muscles: + right lower lumbar  SI Joint: no  Sacrum: no    ROM:  Flexion: intact  Extension: intact  Sidebending: intact    DERMATOMAL SENSATION:  L1: normal   L2: normal   L3: normal   L4: normal   L5: normal   S1: normal    STRENGTH (bilateral):  Knee Extension: 5/5  Knee Flexion: 5/5  Foot Dorsiflexion: 5/5  Great Toe Extension: 5/5  Foot Plantarflexion: 5/5  Hip Flexion: 5/5  Hip Abduction: 5/5    REFLEXES:  Patellar:  Diminished bilateral  Achilles:  Diminished bilateral  Clonus: negative bilateral    BACK:   SUPINE STRAIGHT LEG: negative  PRONE STRAIGHT LEG:  SLUMP: negative    RIGHT HIP:  LOG ROLL: negative  OWEN: negative  FADIR: negative    LEFT HIP:  LOG ROLL: negative  OWEN: negative  FADIR: negative           __________________________________________________________________________  Procedures

## 2022-03-08 ENCOUNTER — APPOINTMENT (EMERGENCY)
Dept: CT IMAGING | Facility: HOSPITAL | Age: 52
End: 2022-03-08
Payer: COMMERCIAL

## 2022-03-08 ENCOUNTER — HOSPITAL ENCOUNTER (EMERGENCY)
Facility: HOSPITAL | Age: 52
Discharge: HOME | End: 2022-03-08
Attending: EMERGENCY MEDICINE | Admitting: EMERGENCY MEDICINE
Payer: COMMERCIAL

## 2022-03-08 ENCOUNTER — APPOINTMENT (EMERGENCY)
Dept: RADIOLOGY | Facility: HOSPITAL | Age: 52
End: 2022-03-08
Payer: COMMERCIAL

## 2022-03-08 ENCOUNTER — OFFICE VISIT (OUTPATIENT)
Dept: URGENT CARE | Facility: CLINIC | Age: 52
End: 2022-03-08
Payer: COMMERCIAL

## 2022-03-08 VITALS
HEART RATE: 86 BPM | BODY MASS INDEX: 41.23 KG/M2 | TEMPERATURE: 99.1 F | SYSTOLIC BLOOD PRESSURE: 119 MMHG | WEIGHT: 295.64 LBS | DIASTOLIC BLOOD PRESSURE: 74 MMHG | RESPIRATION RATE: 22 BRPM | OXYGEN SATURATION: 96 %

## 2022-03-08 VITALS — RESPIRATION RATE: 18 BRPM | TEMPERATURE: 98.3 F | OXYGEN SATURATION: 99 % | HEART RATE: 100 BPM

## 2022-03-08 DIAGNOSIS — R07.9 CHEST PAIN, UNSPECIFIED TYPE: Primary | ICD-10-CM

## 2022-03-08 DIAGNOSIS — R50.9 FEVER: ICD-10-CM

## 2022-03-08 DIAGNOSIS — J02.9 PHARYNGITIS: Primary | ICD-10-CM

## 2022-03-08 DIAGNOSIS — J02.9 SORE THROAT: ICD-10-CM

## 2022-03-08 DIAGNOSIS — N28.1 RENAL CYST, LEFT: ICD-10-CM

## 2022-03-08 DIAGNOSIS — R07.89 ATYPICAL CHEST PAIN: ICD-10-CM

## 2022-03-08 PROBLEM — E66.01 MORBID OBESITY WITH BMI OF 40.0-44.9, ADULT (HCC): Status: ACTIVE | Noted: 2022-03-08

## 2022-03-08 LAB
ALBUMIN SERPL BCP-MCNC: 3.9 G/DL (ref 3.5–5)
ALP SERPL-CCNC: 86 U/L (ref 46–116)
ALT SERPL W P-5'-P-CCNC: 20 U/L (ref 12–78)
ANION GAP SERPL CALCULATED.3IONS-SCNC: 10 MMOL/L (ref 4–13)
APTT PPP: 33 SECONDS (ref 23–37)
AST SERPL W P-5'-P-CCNC: 14 U/L (ref 5–45)
BASOPHILS # BLD AUTO: 0.03 THOUSANDS/ΜL (ref 0–0.1)
BASOPHILS NFR BLD AUTO: 0 % (ref 0–1)
BILIRUB SERPL-MCNC: 1.04 MG/DL (ref 0.2–1)
BILIRUB UR QL STRIP: NEGATIVE
BUN SERPL-MCNC: 9 MG/DL (ref 5–25)
CALCIUM SERPL-MCNC: 8.8 MG/DL (ref 8.3–10.1)
CARDIAC TROPONIN I PNL SERPL HS: 3 NG/L
CHLORIDE SERPL-SCNC: 101 MMOL/L (ref 100–108)
CLARITY UR: CLEAR
CO2 SERPL-SCNC: 28 MMOL/L (ref 21–32)
COLOR UR: YELLOW
CREAT SERPL-MCNC: 0.98 MG/DL (ref 0.6–1.3)
D DIMER PPP FEU-MCNC: 0.6 UG/ML FEU
EOSINOPHIL # BLD AUTO: 0.1 THOUSAND/ΜL (ref 0–0.61)
EOSINOPHIL NFR BLD AUTO: 1 % (ref 0–6)
ERYTHROCYTE [DISTWIDTH] IN BLOOD BY AUTOMATED COUNT: 13.2 % (ref 11.6–15.1)
FLUAV RNA RESP QL NAA+PROBE: NEGATIVE
FLUBV RNA RESP QL NAA+PROBE: NEGATIVE
GFR SERPL CREATININE-BSD FRML MDRD: 90 ML/MIN/1.73SQ M
GLUCOSE SERPL-MCNC: 111 MG/DL (ref 65–140)
GLUCOSE UR STRIP-MCNC: NEGATIVE MG/DL
HCT VFR BLD AUTO: 45 % (ref 36.5–49.3)
HGB BLD-MCNC: 15.4 G/DL (ref 12–17)
HGB UR QL STRIP.AUTO: NEGATIVE
IMM GRANULOCYTES # BLD AUTO: 0.03 THOUSAND/UL (ref 0–0.2)
IMM GRANULOCYTES NFR BLD AUTO: 0 % (ref 0–2)
INR PPP: 1.06 (ref 0.84–1.19)
KETONES UR STRIP-MCNC: NEGATIVE MG/DL
LACTATE SERPL-SCNC: 1.6 MMOL/L (ref 0.5–2)
LEUKOCYTE ESTERASE UR QL STRIP: NEGATIVE
LYMPHOCYTES # BLD AUTO: 1.44 THOUSANDS/ΜL (ref 0.6–4.47)
LYMPHOCYTES NFR BLD AUTO: 18 % (ref 14–44)
MCH RBC QN AUTO: 27.8 PG (ref 26.8–34.3)
MCHC RBC AUTO-ENTMCNC: 34.2 G/DL (ref 31.4–37.4)
MCV RBC AUTO: 81 FL (ref 82–98)
MONOCYTES # BLD AUTO: 0.56 THOUSAND/ΜL (ref 0.17–1.22)
MONOCYTES NFR BLD AUTO: 7 % (ref 4–12)
NEUTROPHILS # BLD AUTO: 5.98 THOUSANDS/ΜL (ref 1.85–7.62)
NEUTS SEG NFR BLD AUTO: 74 % (ref 43–75)
NITRITE UR QL STRIP: NEGATIVE
NRBC BLD AUTO-RTO: 0 /100 WBCS
PH UR STRIP.AUTO: 6 [PH]
PLATELET # BLD AUTO: 180 THOUSANDS/UL (ref 149–390)
PMV BLD AUTO: 9.4 FL (ref 8.9–12.7)
POTASSIUM SERPL-SCNC: 4 MMOL/L (ref 3.5–5.3)
PROCALCITONIN SERPL-MCNC: 0.09 NG/ML
PROT SERPL-MCNC: 7.7 G/DL (ref 6.4–8.2)
PROT UR STRIP-MCNC: NEGATIVE MG/DL
PROTHROMBIN TIME: 13.3 SECONDS (ref 11.6–14.5)
RBC # BLD AUTO: 5.54 MILLION/UL (ref 3.88–5.62)
RSV RNA RESP QL NAA+PROBE: NEGATIVE
S PYO DNA THROAT QL NAA+PROBE: NOT DETECTED
SARS-COV-2 RNA RESP QL NAA+PROBE: NEGATIVE
SODIUM SERPL-SCNC: 139 MMOL/L (ref 136–145)
SP GR UR STRIP.AUTO: 1.02 (ref 1–1.03)
UROBILINOGEN UR QL STRIP.AUTO: 0.2 E.U./DL
WBC # BLD AUTO: 8.14 THOUSAND/UL (ref 4.31–10.16)

## 2022-03-08 PROCEDURE — 87651 STREP A DNA AMP PROBE: CPT | Performed by: PHYSICIAN ASSISTANT

## 2022-03-08 PROCEDURE — 71275 CT ANGIOGRAPHY CHEST: CPT

## 2022-03-08 PROCEDURE — 99285 EMERGENCY DEPT VISIT HI MDM: CPT

## 2022-03-08 PROCEDURE — 0241U HB NFCT DS VIR RESP RNA 4 TRGT: CPT | Performed by: PHYSICIAN ASSISTANT

## 2022-03-08 PROCEDURE — 85025 COMPLETE CBC W/AUTO DIFF WBC: CPT | Performed by: PHYSICIAN ASSISTANT

## 2022-03-08 PROCEDURE — 74177 CT ABD & PELVIS W/CONTRAST: CPT

## 2022-03-08 PROCEDURE — 80053 COMPREHEN METABOLIC PANEL: CPT | Performed by: PHYSICIAN ASSISTANT

## 2022-03-08 PROCEDURE — 83605 ASSAY OF LACTIC ACID: CPT | Performed by: PHYSICIAN ASSISTANT

## 2022-03-08 PROCEDURE — 36415 COLL VENOUS BLD VENIPUNCTURE: CPT | Performed by: PHYSICIAN ASSISTANT

## 2022-03-08 PROCEDURE — 84484 ASSAY OF TROPONIN QUANT: CPT | Performed by: PHYSICIAN ASSISTANT

## 2022-03-08 PROCEDURE — 99285 EMERGENCY DEPT VISIT HI MDM: CPT | Performed by: PHYSICIAN ASSISTANT

## 2022-03-08 PROCEDURE — 85379 FIBRIN DEGRADATION QUANT: CPT | Performed by: PHYSICIAN ASSISTANT

## 2022-03-08 PROCEDURE — 87040 BLOOD CULTURE FOR BACTERIA: CPT | Performed by: PHYSICIAN ASSISTANT

## 2022-03-08 PROCEDURE — G1004 CDSM NDSC: HCPCS

## 2022-03-08 PROCEDURE — 93005 ELECTROCARDIOGRAM TRACING: CPT | Performed by: PHYSICIAN ASSISTANT

## 2022-03-08 PROCEDURE — 96360 HYDRATION IV INFUSION INIT: CPT

## 2022-03-08 PROCEDURE — 93005 ELECTROCARDIOGRAM TRACING: CPT

## 2022-03-08 PROCEDURE — 81003 URINALYSIS AUTO W/O SCOPE: CPT | Performed by: PHYSICIAN ASSISTANT

## 2022-03-08 PROCEDURE — 99213 OFFICE O/P EST LOW 20 MIN: CPT | Performed by: PHYSICIAN ASSISTANT

## 2022-03-08 PROCEDURE — 85610 PROTHROMBIN TIME: CPT | Performed by: PHYSICIAN ASSISTANT

## 2022-03-08 PROCEDURE — 84145 PROCALCITONIN (PCT): CPT | Performed by: PHYSICIAN ASSISTANT

## 2022-03-08 PROCEDURE — 85730 THROMBOPLASTIN TIME PARTIAL: CPT | Performed by: PHYSICIAN ASSISTANT

## 2022-03-08 PROCEDURE — 71045 X-RAY EXAM CHEST 1 VIEW: CPT

## 2022-03-08 PROCEDURE — 96361 HYDRATE IV INFUSION ADD-ON: CPT

## 2022-03-08 RX ORDER — AMOXICILLIN AND CLAVULANATE POTASSIUM 875; 125 MG/1; MG/1
1 TABLET, FILM COATED ORAL ONCE
Status: COMPLETED | OUTPATIENT
Start: 2022-03-08 | End: 2022-03-08

## 2022-03-08 RX ORDER — AMOXICILLIN AND CLAVULANATE POTASSIUM 875; 125 MG/1; MG/1
1 TABLET, FILM COATED ORAL EVERY 12 HOURS
Qty: 14 TABLET | Refills: 0 | Status: SHIPPED | OUTPATIENT
Start: 2022-03-08 | End: 2022-03-15

## 2022-03-08 RX ORDER — SODIUM CHLORIDE 9 MG/ML
3 INJECTION INTRAVENOUS ONCE AS NEEDED
Status: DISCONTINUED | OUTPATIENT
Start: 2022-03-08 | End: 2022-03-08 | Stop reason: HOSPADM

## 2022-03-08 RX ORDER — ACETAMINOPHEN 325 MG/1
975 TABLET ORAL ONCE
Status: COMPLETED | OUTPATIENT
Start: 2022-03-08 | End: 2022-03-08

## 2022-03-08 RX ADMIN — AMOXICILLIN AND CLAVULANATE POTASSIUM 1 TABLET: 875; 125 TABLET, FILM COATED ORAL at 18:57

## 2022-03-08 RX ADMIN — ACETAMINOPHEN 325MG 975 MG: 325 TABLET ORAL at 15:31

## 2022-03-08 RX ADMIN — DEXAMETHASONE SODIUM PHOSPHATE 10 MG: 10 INJECTION, SOLUTION INTRAMUSCULAR; INTRAVENOUS at 18:57

## 2022-03-08 RX ADMIN — SODIUM CHLORIDE 1000 ML: 0.9 INJECTION, SOLUTION INTRAVENOUS at 15:32

## 2022-03-08 RX ADMIN — SODIUM CHLORIDE 1000 ML: 0.9 INJECTION, SOLUTION INTRAVENOUS at 16:14

## 2022-03-08 RX ADMIN — IOHEXOL 100 ML: 350 INJECTION, SOLUTION INTRAVENOUS at 16:57

## 2022-03-08 NOTE — Clinical Note
Jim Allen was seen and treated in our emergency department on 3/8/2022  May return to work when 24 hours without a fever    Diagnosis:     Alf Gibbs    He may return on this date: If you have any questions or concerns, please don't hesitate to call        Marcelo Worley PA-C    ______________________________           _______________          _______________  Hospital Representative                              Date                                Time

## 2022-03-08 NOTE — DISCHARGE INSTRUCTIONS
Rest, plenty of fluids  Take antibiotic as directed for the full duration  Continue to alternate OTC tylenol and ibuprofen as needed for fever/discomfort  Salt water gargles, throat lozenges, etc   Follow up with PCP or return to ER as needed

## 2022-03-08 NOTE — PROGRESS NOTES
[id number]    NAME: Lynn Dang is a 54 y o  male  : 1970    MRN: [id number]  DATE: 2022  TIME: 11:13 AM    Assessment and Plan   Chest pain, unspecified type [R07 9]  1  Chest pain, unspecified type  ECG 12 lead   2  Sore throat         Patient Instructions     Patient Instructions   Recommend patient go to the emergency room for evaluation of his chest pain  Chief Complaint     Chief Complaint   Patient presents with    Earache     Ear pain, sore throat and fatigue since yesterday  History of Present Illness   33-year-old male here with complaint of sore throat and ear pain for the last day  Also feels very tired  Upon further discussion with patient  He had chest pain that started 6 days ago  He has a history of heart attack with cardiac stent placement in 2020  States this was brought on by a lot of stress in his life at the time  Patient took 1 nitro when the chest pain started last week but did not completely resolve  He states that he felt he could have taken another nitro but no if he took it he would have to go to the emergency room  He has had chest pain on and off since then  Patient is experiencing a lot of stress right now  His grandmother passed away and his girlfriend is in the hospital   Currently does not have pain in the room  Does not have shortness of breath  No nausea vomiting  No radiation of the pain  Denies sweating or diaphoresis  He is very fatigued and tired  Review of Systems   Review of Systems   Constitutional: Positive for fatigue  Negative for chills and fever  HENT: Positive for ear pain and sore throat  Negative for congestion, postnasal drip and sinus pressure  Respiratory: Negative for cough, shortness of breath and wheezing  Cardiovascular: Positive for chest pain  Neurological: Negative for headaches  All other systems reviewed and are negative        Current Medications     Current Outpatient Medications:     albuterol (Ventolin HFA) 90 mcg/act inhaler, Inhale 2 puffs every 4 (four) hours as needed for wheezing or shortness of breath (Patient not taking: Reported on 12/23/2021 ), Disp: 18 g, Rfl: 1    aspirin (ECOTRIN LOW STRENGTH) 81 mg EC tablet, Take 1 tablet (81 mg total) by mouth daily, Disp: 90 tablet, Rfl: 3    atorvastatin (LIPITOR) 40 mg tablet, Take 1 tablet (40 mg total) by mouth daily with dinner, Disp: 90 tablet, Rfl: 3    B Complex Vitamins (VITAMIN B COMPLEX PO), Take by mouth (Patient not taking: Reported on 12/23/2021 ), Disp: , Rfl:     chlorhexidine (PERIDEX) 0 12 % solution, Apply 15 mL to the mouth or throat 2 (two) times a day, Disp: 120 mL, Rfl: 0    Cholecalciferol 25 MCG (1000 UT) tablet, Take 1,000 Units by mouth (Patient not taking: Reported on 12/23/2021 ), Disp: , Rfl:     clopidogrel (PLAVIX) 75 mg tablet, Take 1 tablet (75 mg total) by mouth daily, Disp: 90 tablet, Rfl: 3    lisinopril (ZESTRIL) 5 mg tablet, Take 1 tablet (5 mg total) by mouth daily, Disp: 90 tablet, Rfl: 3    metoprolol succinate (TOPROL-XL) 50 mg 24 hr tablet, Take 1 tablet (50 mg total) by mouth daily, Disp: 30 tablet, Rfl: 6    Multiple Vitamin (MULTIVITAMIN ADULT PO), Take 1 tablet by mouth (Patient not taking: Reported on 12/23/2021 ), Disp: , Rfl:     naproxen (Naprosyn) 500 mg tablet, Take 1 tablet (500 mg total) by mouth 2 (two) times a day as needed for mild pain, Disp: 30 tablet, Rfl: 0    naproxen (Naprosyn) 500 mg tablet, Take 1 tablet (500 mg total) by mouth 2 (two) times a day as needed for mild pain, Disp: 21 tablet, Rfl: 0    nicotine (NICODERM CQ) 21 mg/24 hr TD 24 hr patch, Place 1 patch on the skin every 24 hours (Patient not taking: Reported on 1/13/2022 ), Disp: 28 patch, Rfl: 0    nitroglycerin (NITROSTAT) 0 4 mg SL tablet, Place 1 tablet (0 4 mg total) under the tongue every 5 (five) minutes as needed for chest pain (Patient not taking: Reported on 2/2/2022 ), Disp: 30 tablet, Rfl: 3    OMEGA-3 FATTY ACIDS PO, Take 500 mg by mouth (Patient not taking: Reported on 12/23/2021 ), Disp: , Rfl:     predniSONE 10 mg tablet, 3 tabs BID x 4 days, 2 tabs BID x 3 days, 1 tab BID x 3 days (Patient not taking: Reported on 12/23/2021 ), Disp: 42 tablet, Rfl: 0    Respiratory Therapy Supplies (Spirometer) KIT, Use 4 (four) times a day (Patient not taking: Reported on 2/2/2022 ), Disp: 1 kit, Rfl: 0    sildenafil (VIAGRA) 25 MG tablet, Take 1 tablet (25 mg total) by mouth daily as needed for erectile dysfunction (Patient not taking: Reported on 2/2/2022 ), Disp: 10 tablet, Rfl: 3    TURMERIC PO, Take 500 mg by mouth (Patient not taking: Reported on 12/23/2021 ), Disp: , Rfl:     Current Allergies     Allergies as of 03/08/2022 - Reviewed 03/08/2022   Allergen Reaction Noted    Bupropion Itching 01/21/2019          The following portions of the patient's history were reviewed and updated as appropriate: allergies, current medications, past family history, past medical history, past social history, past surgical history and problem list    Past Medical History:   Diagnosis Date    Heart attack ([facility] [address] ) 06/28/2021     Past Surgical History:   Procedure Laterality Date    CORONARY ANGIOPLASTY      FINGER FRACTURE SURGERY      MOUTH SURGERY       Family History   Problem Relation Age of Onset    Hypertension Mother     Breast cancer Mother     Heart attack Father     Hyperlipidemia Father     Hyperlipidemia Sister     Heart disease Maternal Grandmother     Heart disease Maternal Grandfather     Heart attack Maternal Grandfather      Social History     Socioeconomic History    Marital status: Single     Spouse name: Not on file    Number of children: Not on file    Years of education: Not on file    Highest education level: Not on file   Occupational History    Not on file   Tobacco Use    Smoking status: Former Smoker     Packs/day: 2 00     Years: 22 00     Pack years: 44 00 Quit date: 2021     Years since quittin 6    Smokeless tobacco: Never Used   Vaping Use    Vaping Use: Every day    Substances: Nicotine   Substance and Sexual Activity    Alcohol use: Not Currently    Drug use: Not Currently     Types: Marijuana     Comment: used once last week    Sexual activity: Not on file   Other Topics Concern    Not on file   Social History Narrative    Not on file     Social Determinants of Health     Financial Resource Strain: Not on file   Food Insecurity: Not on file   Transportation Needs: Not on file   Physical Activity: Not on file   Stress: Not on file   Social Connections: Not on file   Intimate Partner Violence: Not on file   Housing Stability: Not on file     Medications have been verified  Objective   Pulse 100   Temp 98 3 °F (36 8 °C)   Resp 18   SpO2 99%      Physical Exam   Physical Exam  Vitals and nursing note reviewed  Constitutional:       General: He is not in acute distress  Appearance: He is well-developed  HENT:      Head: Normocephalic and atraumatic  Right Ear: Tympanic membrane normal       Left Ear: Tympanic membrane normal       Nose: Nose normal  No mucosal edema  Mouth/Throat:      Pharynx: Oropharyngeal exudate, posterior oropharyngeal erythema and uvula swelling present  Cardiovascular:      Rate and Rhythm: Normal rate and regular rhythm  Heart sounds: Normal heart sounds  Pulmonary:      Effort: Pulmonary effort is normal  No respiratory distress  Breath sounds: Normal breath sounds

## 2022-03-08 NOTE — ED NOTES
3rd bag of fluids hung pt states he does feel his throat is better but is fatigue           Carleen Martinez, RN  03/08/22 [id number]

## 2022-03-08 NOTE — ED NOTES
Pt labs sent iv bolus started bag one in hand and bag two in ac pt on monitor call bell in reach      Anthony Mooney RN  03/08/22 [id number]

## 2022-03-08 NOTE — Clinical Note
Florentino Buckley was seen and treated in our emergency department on 3/8/2022  May return to work when 24 hours without a fever    Diagnosis:     Tami Bragg    He may return on this date: If you have any questions or concerns, please don't hesitate to call        Luis Armando Cruz PA-C    ______________________________           _______________          _______________  Hospital Representative                              Date                                Time

## 2022-03-08 NOTE — ED PROVIDER NOTES
History  Chief Complaint   Patient presents with    Chest Pain     pt complains of increasing chest pain since Thursday, recently loss grandmother and issues on job, also complains of sore throat     54year old male with PMH HTN, HLD, CAD presents ambulatory from home for evaluation of sore throat and chest pain  Pt reports his sore throat started yesterday after his grandmother's    He went to urgent care today but he states they didn't do anything for him  He was referred to ER due to his chest pain  He reports trying an OTC cough syrup without relief  He admits to fatigue, fever, chills  Has a slight cough  Sore throat is most bothersome  Hurts to swallow  He has been having intermittent chest pain since Thursday  He reports having a prior heart attack with stent placement in   He admits to a lot of stress - grandmother , back to work after a back injury, fiance in the hospital, etc   He reports this pain "doesn't feel like a heart attack"  Denies SOB  Denies N/V/D, abdominal pain  Denies any urinary complaints  Denies sick contacts  No rashes reported  No reported aggravating or alleviating factors  Last took tylenol last night  He has been vaccinated against covid x2  Did not receive booster        History provided by:  Patient and medical records   used: No    Chest Pain  Pain radiates to the back: no    Timing:  Intermittent  Chronicity:  New  Worsened by:  Coughing  Associated symptoms: cough, fatigue and fever    Associated symptoms: no abdominal pain, no altered mental status, no back pain, no dizziness, no dysphagia, no headache, no lower extremity edema, no nausea, no numbness, no palpitations, no shortness of breath, no syncope and not vomiting    Risk factors: coronary artery disease, high cholesterol, hypertension, male sex, obesity and smoking    Risk factors: no diabetes mellitus, no prior DVT/PE and no surgery        Prior to Admission Medications   Prescriptions Last Dose Informant Patient Reported? Taking?    B Complex Vitamins (VITAMIN B COMPLEX PO)   Yes No   Sig: Take by mouth   Patient not taking: Reported on 12/23/2021    Cholecalciferol 25 MCG (1000 UT) tablet   Yes No   Sig: Take 1,000 Units by mouth   Patient not taking: Reported on 12/23/2021    Multiple Vitamin (MULTIVITAMIN ADULT PO)   Yes No   Sig: Take 1 tablet by mouth   Patient not taking: Reported on 12/23/2021    OMEGA-3 FATTY ACIDS PO   Yes No   Sig: Take 500 mg by mouth   Patient not taking: Reported on 12/23/2021    Respiratory Therapy Supplies (Spirometer) KIT   No No   Sig: Use 4 (four) times a day   Patient not taking: Reported on 2/2/2022    TURMERIC PO   Yes No   Sig: Take 500 mg by mouth   Patient not taking: Reported on 12/23/2021    albuterol (Ventolin HFA) 90 mcg/act inhaler   No No   Sig: Inhale 2 puffs every 4 (four) hours as needed for wheezing or shortness of breath   Patient not taking: Reported on 12/23/2021    aspirin (ECOTRIN LOW STRENGTH) 81 mg EC tablet   No No   Sig: Take 1 tablet (81 mg total) by mouth daily   atorvastatin (LIPITOR) 40 mg tablet   No No   Sig: Take 1 tablet (40 mg total) by mouth daily with dinner   chlorhexidine (PERIDEX) 0 12 % solution   No No   Sig: Apply 15 mL to the mouth or throat 2 (two) times a day   clopidogrel (PLAVIX) 75 mg tablet   No No   Sig: Take 1 tablet (75 mg total) by mouth daily   lisinopril (ZESTRIL) 5 mg tablet   No No   Sig: Take 1 tablet (5 mg total) by mouth daily   metoprolol succinate (TOPROL-XL) 50 mg 24 hr tablet   No No   Sig: Take 1 tablet (50 mg total) by mouth daily   naproxen (Naprosyn) 500 mg tablet   No No   Sig: Take 1 tablet (500 mg total) by mouth 2 (two) times a day as needed for mild pain   naproxen (Naprosyn) 500 mg tablet   No No   Sig: Take 1 tablet (500 mg total) by mouth 2 (two) times a day as needed for mild pain   nicotine (NICODERM CQ) 21 mg/24 hr TD 24 hr patch   No No   Sig: Place 1 patch on the skin every 24 hours   Patient not taking: Reported on 2022    nitroglycerin (NITROSTAT) 0 4 mg SL tablet   No No   Sig: Place 1 tablet (0 4 mg total) under the tongue every 5 (five) minutes as needed for chest pain   Patient not taking: Reported on 2022    predniSONE 10 mg tablet   No No   Sig: 3 tabs BID x 4 days, 2 tabs BID x 3 days, 1 tab BID x 3 days   Patient not taking: Reported on 2021    sildenafil (VIAGRA) 25 MG tablet   No No   Sig: Take 1 tablet (25 mg total) by mouth daily as needed for erectile dysfunction   Patient not taking: Reported on 2022       Facility-Administered Medications: None       Past Medical History:   Diagnosis Date    [facility]       Past Surgical History:   Procedure Laterality Date    CORONARY ANGIOPLASTY      FINGER FRACTURE SURGERY      MOUTH SURGERY         Family History   Problem Relation Age of Onset    Hypertension Mother     Breast cancer Mother     Heart attack Father     Hyperlipidemia Father     Hyperlipidemia Sister     Heart disease Maternal Grandmother     Heart disease Maternal Grandfather     Heart attack Maternal Grandfather      I have reviewed and agree with the history as documented  E-Cigarette/Vaping    E-Cigarette Use Current Every Day User      E-Cigarette/Vaping Substances    Nicotine Yes     THC No     CBD No     Flavoring No     Other No     Unknown No      Social History     Tobacco Use    Smoking status: Former Smoker     Packs/day: 2 00     Years: 22 00     Pack years: 44 00     Quit date: 2021     Years since quittin 6    Smokeless tobacco: Never Used   Vaping Use    Vaping Use: Every day    Substances: Nicotine   Substance Use Topics    Alcohol use: Not Currently    Drug use: Not Currently     Types: Marijuana     Comment: used once last week       Review of Systems   Constitutional: Positive for chills, fatigue and fever  HENT: Positive for sore throat   Negative for congestion, rhinorrhea, trouble swallowing and voice change  Eyes: Negative  Negative for visual disturbance  Respiratory: Positive for cough  Negative for shortness of breath and wheezing  Cardiovascular: Positive for chest pain  Negative for palpitations, leg swelling and syncope  Gastrointestinal: Negative  Negative for abdominal pain, constipation, diarrhea, nausea and vomiting  Genitourinary: Negative  Negative for dysuria, flank pain, frequency and hematuria  Musculoskeletal: Positive for myalgias  Negative for back pain and neck pain  Skin: Negative  Negative for rash  Neurological: Negative  Negative for dizziness, light-headedness, numbness and headaches  Psychiatric/Behavioral: Negative  Negative for confusion  All other systems reviewed and are negative  Physical Exam  Physical Exam  Vitals and nursing note reviewed  Constitutional:       General: He is not in acute distress  Appearance: Normal appearance  He is well-developed  He is obese  He is not toxic-appearing or diaphoretic  HENT:      Head: Normocephalic and atraumatic  Right Ear: Hearing, tympanic membrane, ear canal and external ear normal       Left Ear: Hearing, tympanic membrane, ear canal and external ear normal       Nose: Nose normal       Mouth/Throat:      Lips: No lesions  Mouth: Mucous membranes are moist  No oral lesions  Tongue: Tongue does not deviate from midline  Pharynx: Oropharynx is clear  Uvula midline  Posterior oropharyngeal erythema present  No pharyngeal swelling, oropharyngeal exudate or uvula swelling  Tonsils: No tonsillar abscesses  Eyes:      General: Lids are normal  No scleral icterus  Extraocular Movements: Extraocular movements intact  Conjunctiva/sclera: Conjunctivae normal       Pupils: Pupils are equal, round, and reactive to light  Neck:      Trachea: Trachea and phonation normal  No tracheal deviation     Cardiovascular:      Rate and Rhythm: Regular rhythm  Tachycardia present  Pulses: Normal pulses  Radial pulses are 2+ on the right side and 2+ on the left side  Dorsalis pedis pulses are 2+ on the right side and 2+ on the left side  Posterior tibial pulses are 2+ on the right side and 2+ on the left side  Heart sounds: Normal heart sounds, S1 normal and S2 normal  No murmur heard  Pulmonary:      Effort: Pulmonary effort is normal  No tachypnea or respiratory distress  Breath sounds: Normal breath sounds  No wheezing, rhonchi or rales  Abdominal:      General: Bowel sounds are normal  There is no distension  Palpations: Abdomen is soft  Tenderness: There is no abdominal tenderness  There is no guarding or rebound  Musculoskeletal:         General: No tenderness  Normal range of motion  Cervical back: Normal range of motion and neck supple  Right lower leg: No tenderness  No edema  Left lower leg: No tenderness  No edema  Skin:     General: Skin is warm and dry  Capillary Refill: Capillary refill takes less than 2 seconds  Findings: No rash  Neurological:      General: No focal deficit present  Mental Status: He is alert and oriented to person, place, and time  GCS: GCS eye subscore is 4  GCS verbal subscore is 5  GCS motor subscore is 6  Motor: No abnormal muscle tone        Gait: Gait normal    Psychiatric:         Mood and Affect: Mood normal          Speech: Speech normal          Behavior: Behavior normal          Vital Signs  ED Triage Vitals   Temperature Pulse Respirations Blood Pressure SpO2   03/08/22 1507 03/08/22 1507 03/08/22 1507 03/08/22 1511 03/08/22 1507   (!) 102 3 °F (39 1 °C) (!) 108 21 146/80 96 %      Temp Source Heart Rate Source Patient Position - Orthostatic VS BP Location FiO2 (%)   03/08/22 1507 03/08/22 1507 03/08/22 1507 03/08/22 1507 --   Tympanic Monitor Lying Right arm       Pain Score       03/08/22 1507 8           Vitals:    03/08/22 1745 03/08/22 1800 03/08/22 1815 03/08/22 1830   BP: 119/69 124/67 117/67 119/74   Pulse: 88 88 84 86   Patient Position - Orthostatic VS:             Visual Acuity      ED Medications  Medications   sodium chloride (PF) 0 9 % injection 3 mL (has no administration in time range)   sodium chloride 0 9 % bolus 1,000 mL (0 mL Intravenous Stopped 3/8/22 1634)     Followed by   sodium chloride 0 9 % bolus 1,000 mL (0 mL Intravenous Stopped 3/8/22 1614)     Followed by   sodium chloride 0 9 % bolus 1,000 mL (0 mL Intravenous Stopped 3/8/22 1727)   acetaminophen (TYLENOL) tablet 975 mg (975 mg Oral Given 3/8/22 1531)   iohexol (OMNIPAQUE) 350 MG/ML injection (SINGLE-DOSE) 100 mL (100 mL Intravenous Given 3/8/22 1657)   amoxicillin-clavulanate (AUGMENTIN) 875-125 mg per tablet 1 tablet (1 tablet Oral Given 3/8/22 1857)   dexamethasone oral liquid 10 mg 1 mL (10 mg Oral Given 3/8/22 1857)       Diagnostic Studies  Results Reviewed     Procedure Component Value Units Date/Time    UA w Reflex to Microscopic w Reflex to Culture [[id number]] Collected: 03/08/22 1723    Lab Status: Final result Specimen: Urine, Clean Catch Updated: 03/08/22 1731     Color, UA Yellow     Clarity, UA Clear     Specific [place], UA 1 020     pH, UA 6 0     Leukocytes, UA Negative     Nitrite, UA Negative     Protein, UA Negative mg/dl      Glucose, UA Negative mg/dl      Ketones, UA Negative mg/dl      Urobilinogen, UA 0 2 E U /dl      Bilirubin, UA Negative     Blood, UA Negative    COVID/FLU/RSV - 2 hour TAT [[id number]]  (Normal) Collected: 03/08/22 1525    Lab Status: Final result Specimen: Nasopharyngeal Swab Updated: 03/08/22 1644     SARS-CoV-2 Negative     INFLUENZA A PCR Negative     INFLUENZA B PCR Negative     RSV PCR Negative    Narrative:      FOR PEDIATRIC PATIENTS - copy/paste COVID Guidelines URL to browser: [email or web address]    SARS-CoV-2 assay is a Nucleic Acid Amplification assay intended for the  qualitative detection of nucleic acid from SARS-CoV-2 in nasopharyngeal  swabs  Results are for the presumptive identification of SARS-CoV-2 RNA  Positive results are indicative of infection with SARS-CoV-2, the virus  causing COVID-19, but do not rule out bacterial infection or co-infection  with other viruses  Laboratories within the [place] and its  territories are required to report all positive results to the appropriate  public health authorities  Negative results do not preclude SARS-CoV-2  infection and should not be used as the sole basis for treatment or other  patient management decisions  Negative results must be combined with  clinical observations, patient history, and epidemiological information  This test has not been FDA cleared or approved  This test has been authorized by FDA under an Emergency Use Authorization  (EUA)  This test is only authorized for the duration of time the  declaration that circumstances exist justifying the authorization of the  emergency use of an in vitro diagnostic tests for detection of SARS-CoV-2  virus and/or diagnosis of COVID-19 infection under section 564(b)(1) of  the Act, 21 U  S C  419TXK-9(X)(7), unless the authorization is terminated  or revoked sooner  The test has been validated but independent review by FDA  and CLIA is pending  Test performed using [employer] Gene[employer]pert: This RT-PCR assay targets N2,  a region unique to SARS-CoV-2  A conserved region in the E-gene was chosen  for pan-Sarbecovirus detection which includes SARS-CoV-2      Strep A PCR [[id number]]  (Normal) Collected: 03/08/22 1524    Lab Status: Final result Specimen: Throat Updated: 03/08/22 1614     STREP A PCR Not Detected    Procalcitonin with AM Reflex [[id number]]  (Normal) Collected: 03/08/22 1524    Lab Status: Final result Specimen: Blood from Arm, Left Updated: 03/08/22 1603     Procalcitonin 0 09 ng/ml     Procalcitonin Reflex [[id number]]     Lab Status: No result Specimen: Blood     HS Troponin 0hr (reflex protocol) [[id number]]  (Normal) Collected: 03/08/22 1524    Lab Status: Final result Specimen: Blood from Arm, Left Updated: 03/08/22 1600     hs TnI 0hr 3 ng/L     D-Dimer [[id number]]  (Abnormal) Collected: 03/08/22 1530    Lab Status: Final result Specimen: Blood from Arm, Left Updated: 03/08/22 1600     D-Dimer, Quant 0 60 ug/ml FEU     Lactic acid [[id number]]  (Normal) Collected: 03/08/22 1524    Lab Status: Final result Specimen: Blood from Arm, Left Updated: 03/08/22 1554     LACTIC ACID 1 6 mmol/L     Narrative:      Result may be elevated if tourniquet was used during collection      Comprehensive metabolic panel [[id number]]  (Abnormal) Collected: 03/08/22 1524    Lab Status: Final result Specimen: Blood from Arm, Left Updated: 03/08/22 1551     Sodium 139 mmol/L      Potassium 4 0 mmol/L      Chloride 101 mmol/L      CO2 28 mmol/L      ANION GAP 10 mmol/L      BUN 9 mg/dL      Creatinine 0 98 mg/dL      Glucose 111 mg/dL      Calcium 8 8 mg/dL      AST 14 U/L      ALT 20 U/L      Alkaline Phosphatase 86 U/L      Total Protein 7 7 g/dL      Albumin 3 9 g/dL      Total Bilirubin 1 04 mg/dL      eGFR 90 ml/min/1 73sq m     Narrative:      [place] guidelines for Chronic Kidney Disease (CKD):     Stage 1 with normal or high GFR (GFR > 90 mL/min/1 73 square meters)    Stage 2 Mild CKD (GFR = 60-89 mL/min/1 73 square meters)    Stage 3A Moderate CKD (GFR = 45-59 mL/min/1 73 square meters)    Stage 3B Moderate CKD (GFR = 30-44 mL/min/1 73 square meters)    Stage 4 Severe CKD (GFR = 15-29 mL/min/1 73 square meters)    Stage 5 End Stage CKD (GFR <15 mL/min/1 73 square meters)  Note: GFR calculation is accurate only with a steady state creatinine    Protime-INR [[id number]]  (Normal) Collected: 03/08/22 1524    Lab Status: Final result Specimen: Blood from Arm, Left Updated: 03/08/22 1549     Protime 13 3 seconds      INR 1 06    APTT [[id number]]  (Normal) Collected: 03/08/22 1524    Lab Status: Final result Specimen: Blood from Arm, Left Updated: 03/08/22 1549     PTT 33 seconds     CBC and differential [[id number]]  (Abnormal) Collected: 03/08/22 1524    Lab Status: Final result Specimen: Blood from Arm, Left Updated: 03/08/22 1536     WBC 8 14 Thousand/uL      RBC 5 54 Million/uL      Hemoglobin 15 4 g/dL      Hematocrit 45 0 %      MCV 81 fL      MCH 27 8 pg      MCHC 34 2 g/dL      RDW 13 2 %      MPV 9 4 fL      Platelets 605 Thousands/uL      nRBC 0 /100 WBCs      Neutrophils Relative 74 %      Immat GRANS % 0 %      Lymphocytes Relative 18 %      Monocytes Relative 7 %      Eosinophils Relative 1 %      Basophils Relative 0 %      Neutrophils Absolute 5 98 Thousands/µL      Immature Grans Absolute 0 03 Thousand/uL      Lymphocytes Absolute 1 44 Thousands/µL      Monocytes Absolute 0 56 Thousand/µL      Eosinophils Absolute 0 10 Thousand/µL      Basophils Absolute 0 03 Thousands/µL     Blood culture #2 [[id number]] Collected: 03/08/22 1524    Lab Status: In process Specimen: Blood from Hand, Left Updated: 03/08/22 1533    Blood culture #1 [[id number]] Collected: 03/08/22 1524    Lab Status: In process Specimen: Blood from Arm, Left Updated: 03/08/22 1533                 CT pe study w abdomen pelvis w contrast   Final Result by Alok Zeng MD (03/08 1809)         1  No evidence of acute pulmonary embolus, thoracic aortic aneurysm or dissection  No acute cardiopulmonary process  2   No acute intra-abdominal or pelvic process  Workstation performed: [id number]         XR chest portable   Final Result by James Maynard MD (03/08 1635)      No acute cardiopulmonary disease                    Workstation performed: [id number]                    Procedures  ECG 12 Lead Documentation Only    Date/Time: 3/8/2022 3:09 PM  Performed by: Naseem Worthington PA-C  Authorized by: Darian Ballesteros RAJNI Crystal     Indications / Diagnosis:  Chest pain  ECG reviewed by me, the ED Provider: yes    Patient location:  ED  Previous ECG:     Comparison to cardiac monitor: Yes    Interpretation:     Interpretation: non-specific    Rate:     ECG rate:  107    ECG rate assessment: tachycardic    Rhythm:     Rhythm: sinus tachycardia    Ectopy:     Ectopy: none    QRS:     QRS axis:  Normal    QRS intervals:  Normal  Conduction:     Conduction: normal    ST segments:     ST segments:  Normal  T waves:     T waves: normal    Comments:      , QRS 96, QT/QTc 312/416; no acute ischemic changes  ED Course  ED Course as of 03/08/22 1914   Tue Mar 08, 2022   1542 WBC: 8 14   1542 Hemoglobin: 15 4   1542 Platelet Count: 943   1543 XR chest portable  Independently viewed and interpreted by me - no acute cardiopulmonary process; pending official read  1551 Glucose, Random: 111   1552 Creatinine: 0 98   1552 BUN: 9   1552 Sodium: 139   1552 Potassium: 4 0   1552 Chloride: 101   1552 CO2: 28   1552 Anion Gap: 10   1552 Calcium: 8 8   1552 AST: 14   1552 ALT: 20   1552 Alkaline Phosphatase: 86   1552 Total Protein: 7 7   1552 Albumin: 3 9   1552 TOTAL BILIRUBIN(!): 1 04   1552 eGFR: 90   1552 POCT INR: 1 06   1552 PROTIME: 13 3   1552 PTT: 33   1556 LACTIC ACID: 1 6   1605 hs TnI 0hr: 3   1605 Procalcitonin: 0 09   1605 D-Dimer, Quant(!): 0 60  Will proceed with CT imaging  [address] PCR: Not Detected   4305 Pt updated on results  He reports feeling improved  Denies chest pain at present  Agreeable to further evaluation with CT imaging  1647 SARS-COV-2: Negative   1647 INFLU A PCR: Negative   6879 INFLU B PCR: Negative   1647 RSV PCR: Negative   1732 UA w Reflex to Microscopic w Reflex to Culture  Unremarkable  1733 CT performed and pending interpretation  1811 CT pe study w abdomen pelvis w contrast  IMPRESSION:      1    No evidence of acute pulmonary embolus, thoracic aortic aneurysm or dissection  No acute cardiopulmonary process  2   No acute intra-abdominal or pelvic process  1843 Pt updated on results of CT scan  He continues to feel improved  Pt's fever improved with antipyretics  HR improved with fluids and treatment of fever  Work up here was unremarkable  Sore throat felt to be focal source of pt's symptoms  Strep negative  Covid/flu/rsv negative  UA negative  Labs unremarkable other than mildly elevated d dimer  CT imaging negative to include CTA chest negative for PE  No other infectious etiologies noted  Noted to have left renal cyst which was discussed with pt  EKG non ischemic, initial troponin of 3 - based on duration of symptoms, doubt ACS  Discussed continued symptomatic/supportive care  Advised rest, fluids, OTC meds as needed for symptoms  Abx as directed  Continue tylenol and ibuprofen for fever/discomfort, salt water gargles, throat lozenges, etc   Strict return precautions outlined  Advised outpatient follow up with PCP or return to ER for change in condition as outlined  Pt verbalized understanding and had no further questions  Pt left in stable, improved condition            HEART Risk Score      Most Recent Value   Heart Score Risk Calculator    History 0 Filed at: 03/08/2022 1555   ECG 0 Filed at: 03/08/2022 1555   Age 1 Filed at: 03/08/2022 1555   Risk Factors 2 Filed at: 03/08/2022 1555   Troponin 0 Filed at: 03/08/2022 1555   HEART Score 3 Filed at: 03/08/2022 1555                            Wells' Criteria for PE      Most Recent Value   Wells' Criteria for PE    Clinical signs and symptoms of DVT 0 Filed at: 03/08/2022 1555   PE is primary diagnosis or equally likely 0 Filed at: 03/08/2022 1555   HR >100 1 5 Filed at: 03/08/2022 1555   Immobilization at least 3 days or Surgery in the previous 4 weeks 0 Filed at: 03/08/2022 1555   Previous, objectively diagnosed PE or DVT 0 Filed at: 03/08/2022 1555   Hemoptysis 0 Filed at: 03/08/2022 1555 Malignancy with treatment within 6 months or palliative 0 Filed at: 03/08/2022 1555   Wells' Criteria Total 1 5 Filed at: 03/08/2022 1555                [facility]  Number of Diagnoses or Management Options  Atypical chest pain: new and requires workup  Fever: new and requires workup  Pharyngitis: new and requires workup  Renal cyst, left: new and does not require workup     Amount and/or Complexity of Data Reviewed  Clinical lab tests: ordered and reviewed  Tests in the radiology section of CPT®: ordered and reviewed  Decide to obtain previous medical records or to obtain history from someone other than the patient: yes  Obtain history from someone other than the patient: yes  Review and summarize past medical records: yes  Independent visualization of images, tracings, or specimens: yes    Patient Progress  Patient progress: improved      Disposition  Final diagnoses:   Pharyngitis   Fever   Atypical chest pain   Renal cyst, left     Time reflects when diagnosis was documented in both MDM as applicable and the Disposition within this note     Time User Action Codes Description Comment    3/8/2022  6:50 PM Nakita Lavender Add [J02 9] Pharyngitis     3/8/2022  6:50 PM Buddy Bristle [R50 9] Fever     3/8/2022  6:50 PM Nakita Lavender Add [R07 89] Atypical chest pain     3/8/2022  6:50 PM Nakita Lavender Add [N28 1] Renal cyst, left       ED Disposition     ED Disposition Condition Date/Time Comment    Discharge Stable Tue Mar 8, 2022  6:50 PM Marielena Salgado discharge to home/self care              Follow-up Information     Follow up With Specialties Details Why Contact Info Additional Information    hSon Henriquez MD  Schedule an appointment as soon as possible for a visit   [address] [place] [address] Emergency Department Emergency Medicine  As needed [facility] [phone]  [address] [facility] Emergency Department, [facility], [place], [address] [facility], [id number]          Discharge Medication List as of 3/8/2022  6:52 PM      START taking these medications    Details   amoxicillin-clavulanate (AUGMENTIN) 875-125 mg per tablet Take 1 tablet by mouth every 12 (twelve) hours for 7 days, Starting Tue 3/8/2022, Until Tue 3/15/2022, Normal         CONTINUE these medications which have NOT CHANGED    Details   albuterol (Ventolin HFA) 90 mcg/act inhaler Inhale 2 puffs every 4 (four) hours as needed for wheezing or shortness of breath, Starting Mon 10/11/2021, Normal      aspirin (ECOTRIN LOW STRENGTH) 81 mg EC tablet Take 1 tablet (81 mg total) by mouth daily, Starting Fri 7/2/2021, Normal      atorvastatin (LIPITOR) 40 mg tablet Take 1 tablet (40 mg total) by mouth daily with dinner, Starting Thu 7/1/2021, Normal      B Complex Vitamins (VITAMIN B COMPLEX PO) Take by mouth, Historical Med      chlorhexidine (PERIDEX) 0 12 % solution Apply 15 mL to the mouth or throat 2 (two) times a day, Starting Wed 2/2/2022, Normal      Cholecalciferol 25 MCG (1000 UT) tablet Take 1,000 Units by mouth, Historical Med      clopidogrel (PLAVIX) 75 mg tablet Take 1 tablet (75 mg total) by mouth daily, Starting Fri 7/2/2021, Normal      lisinopril (ZESTRIL) 5 mg tablet Take 1 tablet (5 mg total) by mouth daily, Starting Fri 7/2/2021, Normal      metoprolol succinate (TOPROL-XL) 50 mg 24 hr tablet Take 1 tablet (50 mg total) by mouth daily, Starting Wed 1/12/2022, Normal      Multiple Vitamin (MULTIVITAMIN ADULT PO) Take 1 tablet by mouth, Historical Med      !! naproxen (Naprosyn) 500 mg tablet Take 1 tablet (500 mg total) by mouth 2 (two) times a day as needed for mild pain, Starting Sun 1/9/2022, Print      !! naproxen (Naprosyn) 500 mg tablet Take 1 tablet (500 mg total) by mouth 2 (two) times a day as needed for mild pain, Starting Wed 2/2/2022, Normal      nicotine (NICODERM CQ) 21 mg/24 hr TD 24 hr patch Place 1 patch on the skin every 24 hours, Starting Thu 8/19/2021, Normal      nitroglycerin (NITROSTAT) 0 4 mg SL tablet Place 1 tablet (0 4 mg total) under the tongue every 5 (five) minutes as needed for chest pain, Starting Thu 12/23/2021, Normal      OMEGA-3 FATTY ACIDS PO Take 500 mg by mouth, Historical Med      predniSONE 10 mg tablet 3 tabs BID x 4 days, 2 tabs BID x 3 days, 1 tab BID x 3 days, Normal      Respiratory Therapy Supplies (Spirometer) KIT Use 4 (four) times a day, Starting Sat 1/15/2022, Normal      sildenafil (VIAGRA) 25 MG tablet Take 1 tablet (25 mg total) by mouth daily as needed for erectile dysfunction, Starting Tue 8/31/2021, Normal      TURMERIC PO Take 500 mg by mouth, Historical Med       !! - Potential duplicate medications found  Please discuss with provider  No discharge procedures on file      PDMP Review     None          ED Provider  Electronically Signed by           Noble Mahajan PA-C  03/08/22 [id number]

## 2022-03-08 NOTE — ED NOTES
Pt here with chest pain that started yesterday  Per pt just lost grandmother yesterday as well  Pt says he is also having a sore throat       Wild Coppola, RN  03/08/22 [id number]

## 2022-03-09 LAB
ATRIAL RATE: 102 BPM
P AXIS: 53 DEGREES
PR INTERVAL: 138 MS
QRS AXIS: 41 DEGREES
QRSD INTERVAL: 96 MS
QT INTERVAL: 318 MS
QTC INTERVAL: 414 MS
T WAVE AXIS: 47 DEGREES
VENTRICULAR RATE: 102 BPM

## 2022-03-09 PROCEDURE — 93010 ELECTROCARDIOGRAM REPORT: CPT | Performed by: INTERNAL MEDICINE

## 2022-03-11 LAB
ATRIAL RATE: 107 BPM
P AXIS: 54 DEGREES
PR INTERVAL: 148 MS
QRS AXIS: 51 DEGREES
QRSD INTERVAL: 96 MS
QT INTERVAL: 312 MS
QTC INTERVAL: 416 MS
T WAVE AXIS: 49 DEGREES
VENTRICULAR RATE: 107 BPM

## 2022-03-11 PROCEDURE — 93010 ELECTROCARDIOGRAM REPORT: CPT | Performed by: INTERNAL MEDICINE

## 2022-03-14 LAB
BACTERIA BLD CULT: NORMAL
BACTERIA BLD CULT: NORMAL

## 2022-04-20 ENCOUNTER — OFFICE VISIT (OUTPATIENT)
Dept: URGENT CARE | Facility: CLINIC | Age: 52
End: 2022-04-20
Payer: COMMERCIAL

## 2022-04-20 VITALS — OXYGEN SATURATION: 98 % | HEART RATE: 86 BPM | RESPIRATION RATE: 18 BRPM | TEMPERATURE: 98.2 F

## 2022-04-20 DIAGNOSIS — K52.9 AGE (ACUTE GASTROENTERITIS): Primary | ICD-10-CM

## 2022-04-20 PROCEDURE — 99213 OFFICE O/P EST LOW 20 MIN: CPT | Performed by: PHYSICIAN ASSISTANT

## 2022-04-20 NOTE — PROGRESS NOTES
[facility]    NAME: Ora Rangel is a 54 y o  male  : 1970    MRN: [id number]  DATE: 2022  TIME: 9:54 AM    Assessment and Plan   AGE (acute gastroenteritis) [K52 9]  1  AGE (acute gastroenteritis)         Patient Instructions     Patient Instructions   Stay hydrated by taking small sips of water through out the day  Avoid large amounts of water at one time  Advance diet as tolerated starting with crackers, toast   Can use imodium for diarrhea  If you are unable to hold down fluids and feel dehydrated, go to the emergency room  Can take tylenol or ibuprofen as needed for headache, pain, fever  Probiotics which can be found over the counter in pill form or in yogurt, such as activia, would be beneficial to increase normal gut daniel and help with diarrhea  If symptoms worsen go to the emergency room  Chief Complaint     Chief Complaint   Patient presents with    Abdominal Pain     Pt c/o abdominal pain, vomiting and diarrhea sicne Monday  History of Present Illness   53-year-old male here with complaint of diarrhea, nausea vomiting started 2 days ago  The like had a fever at that time  Having some abdominal discomfort  Needs a note for work  Feels very tired yet and feels he can use another day off  Nausea has improved and patient has been able to hold down fluids  starting to eat some dry toast       Review of Systems   Review of Systems   Constitutional: Negative for chills, fatigue and fever  Respiratory: Negative for cough, shortness of breath and wheezing  Gastrointestinal: Positive for abdominal pain, diarrhea and vomiting  Negative for nausea  Genitourinary: Negative for dysuria, flank pain, frequency, hematuria, scrotal swelling, testicular pain and urgency  Neurological: Negative for headaches  All other systems reviewed and are negative        Current Medications     Current Outpatient Medications:     albuterol (Ventolin HFA) 90 mcg/act inhaler, Inhale 2 puffs every 4 (four) hours as needed for wheezing or shortness of breath (Patient not taking: Reported on 12/23/2021 ), Disp: 18 g, Rfl: 1    aspirin (ECOTRIN LOW STRENGTH) 81 mg EC tablet, Take 1 tablet (81 mg total) by mouth daily, Disp: 90 tablet, Rfl: 3    atorvastatin (LIPITOR) 40 mg tablet, Take 1 tablet (40 mg total) by mouth daily with dinner, Disp: 90 tablet, Rfl: 3    B Complex Vitamins (VITAMIN B COMPLEX PO), Take by mouth (Patient not taking: Reported on 12/23/2021 ), Disp: , Rfl:     chlorhexidine (PERIDEX) 0 12 % solution, Apply 15 mL to the mouth or throat 2 (two) times a day, Disp: 120 mL, Rfl: 0    Cholecalciferol 25 MCG (1000 UT) tablet, Take 1,000 Units by mouth (Patient not taking: Reported on 12/23/2021 ), Disp: , Rfl:     clopidogrel (PLAVIX) 75 mg tablet, Take 1 tablet (75 mg total) by mouth daily, Disp: 90 tablet, Rfl: 3    lisinopril (ZESTRIL) 5 mg tablet, Take 1 tablet (5 mg total) by mouth daily, Disp: 90 tablet, Rfl: 3    metoprolol succinate (TOPROL-XL) 50 mg 24 hr tablet, Take 1 tablet (50 mg total) by mouth daily, Disp: 30 tablet, Rfl: 6    Multiple Vitamin (MULTIVITAMIN ADULT PO), Take 1 tablet by mouth (Patient not taking: Reported on 12/23/2021 ), Disp: , Rfl:     naproxen (Naprosyn) 500 mg tablet, Take 1 tablet (500 mg total) by mouth 2 (two) times a day as needed for mild pain, Disp: 30 tablet, Rfl: 0    naproxen (Naprosyn) 500 mg tablet, Take 1 tablet (500 mg total) by mouth 2 (two) times a day as needed for mild pain, Disp: 21 tablet, Rfl: 0    nicotine (NICODERM CQ) 21 mg/24 hr TD 24 hr patch, Place 1 patch on the skin every 24 hours (Patient not taking: Reported on 1/13/2022 ), Disp: 28 patch, Rfl: 0    nitroglycerin (NITROSTAT) 0 4 mg SL tablet, Place 1 tablet (0 4 mg total) under the tongue every 5 (five) minutes as needed for chest pain (Patient not taking: Reported on 2/2/2022 ), Disp: 30 tablet, Rfl: 3    OMEGA-3 FATTY ACIDS PO, Take 500 mg by mouth (Patient not taking: Reported on 2021 ), Disp: , Rfl:     predniSONE 10 mg tablet, 3 tabs BID x 4 days, 2 tabs BID x 3 days, 1 tab BID x 3 days (Patient not taking: Reported on 2021 ), Disp: 42 tablet, Rfl: 0    Respiratory Therapy Supplies (Spirometer) KIT, Use 4 (four) times a day (Patient not taking: Reported on 2022 ), Disp: 1 kit, Rfl: 0    sildenafil (VIAGRA) 25 MG tablet, Take 1 tablet (25 mg total) by mouth daily as needed for erectile dysfunction (Patient not taking: Reported on 2022 ), Disp: 10 tablet, Rfl: 3    TURMERIC PO, Take 500 mg by mouth (Patient not taking: Reported on 2021 ), Disp: , Rfl:     Current Allergies     Allergies as of 2022 - Reviewed 2022   Allergen Reaction Noted    Bupropion Itching 2019          The following portions of the patient's history were reviewed and updated as appropriate: allergies, current medications, past family history, past medical history, past social history, past surgical history and problem list    Past Medical History:   Diagnosis Date    Heart attack ([facility] [address] ) 2021     Past Surgical History:   Procedure Laterality Date    CORONARY ANGIOPLASTY      FINGER FRACTURE SURGERY      MOUTH SURGERY       Family History   Problem Relation Age of Onset    Hypertension Mother     Breast cancer Mother     Heart attack Father     Hyperlipidemia Father     Hyperlipidemia Sister     Heart disease Maternal Grandmother     Heart disease Maternal Grandfather     Heart attack Maternal Grandfather      Social History     Socioeconomic History    Marital status: Single     Spouse name: Not on file    Number of children: Not on file    Years of education: Not on file    Highest education level: Not on file   Occupational History    Not on file   Tobacco Use    Smoking status: Former Smoker     Packs/day: 2 00     Years: 22 00     Pack years: 44 00     Quit date: 2021     Years since quittin 8    Smokeless tobacco: Never Used   Vaping Use    Vaping Use: Every day    Substances: Nicotine   Substance and Sexual Activity    Alcohol use: Not Currently    Drug use: Not Currently     Types: Marijuana     Comment: used once last week    Sexual activity: Not on file   Other Topics Concern    Not on file   Social History Narrative    Not on file     Social Determinants of Health     Financial Resource Strain: Not on file   Food Insecurity: Not on file   Transportation Needs: Not on file   Physical Activity: Not on file   Stress: Not on file   Social Connections: Not on file   Intimate Partner Violence: Not on file   Housing Stability: Not on file     Medications have been verified  Objective   Pulse 86   Temp 98 2 °F (36 8 °C)   Resp 18   SpO2 98%      Physical Exam   Physical Exam  Vitals reviewed  Constitutional:       General: He is not in acute distress  Appearance: He is well-developed  HENT:      Head: Normocephalic and atraumatic  Right Ear: Tympanic membrane normal       Left Ear: Tympanic membrane normal       Nose: No mucosal edema  Cardiovascular:      Rate and Rhythm: Normal rate and regular rhythm  Heart sounds: Normal heart sounds  Pulmonary:      Effort: Pulmonary effort is normal  No respiratory distress  Breath sounds: Normal breath sounds  Abdominal:      General: Abdomen is flat  Bowel sounds are normal       Palpations: Abdomen is soft  Tenderness: There is no abdominal tenderness  There is no right CVA tenderness or left CVA tenderness

## 2022-04-20 NOTE — LETTER
April 20, 2022     Patient: Anna Chandra   YOB: 1970   Date of Visit: 4/20/2022       To Whom It May Concern: It is my medical opinion that Anna Chandra should not return to work until 4/22/2022  If you have any questions or concerns, please don't hesitate to call           Sincerely,        Jono Moreno PA-C    CC: Anna Chandra

## 2022-04-20 NOTE — PATIENT INSTRUCTIONS
Stay hydrated by taking small sips of water through out the day  Avoid large amounts of water at one time  Advance diet as tolerated starting with crackers, toast   Can use imodium for diarrhea  If you are unable to hold down fluids and feel dehydrated, go to the emergency room  Can take tylenol or ibuprofen as needed for headache, pain, fever  Probiotics which can be found over the counter in pill form or in yogurt, such as activia, would be beneficial to increase normal gut daniel and help with diarrhea  If symptoms worsen go to the emergency room

## 2022-05-02 ENCOUNTER — APPOINTMENT (OUTPATIENT)
Dept: RADIOLOGY | Facility: CLINIC | Age: 52
End: 2022-05-02
Payer: COMMERCIAL

## 2022-05-02 ENCOUNTER — OFFICE VISIT (OUTPATIENT)
Dept: OBGYN CLINIC | Facility: CLINIC | Age: 52
End: 2022-05-02
Payer: COMMERCIAL

## 2022-05-02 VITALS
SYSTOLIC BLOOD PRESSURE: 116 MMHG | WEIGHT: 297 LBS | DIASTOLIC BLOOD PRESSURE: 79 MMHG | BODY MASS INDEX: 41.58 KG/M2 | HEIGHT: 71 IN | HEART RATE: 88 BPM | TEMPERATURE: 97.5 F

## 2022-05-02 DIAGNOSIS — S22.31XA CLOSED FRACTURE OF ONE RIB OF RIGHT SIDE, INITIAL ENCOUNTER: ICD-10-CM

## 2022-05-02 DIAGNOSIS — S32.009G CLOSED FRACTURE OF TRANSVERSE PROCESS OF LUMBAR VERTEBRA WITH DELAYED HEALING, SUBSEQUENT ENCOUNTER: Primary | ICD-10-CM

## 2022-05-02 DIAGNOSIS — S32.009A CLOSED FRACTURE OF TRANSVERSE PROCESS OF LUMBAR VERTEBRA, INITIAL ENCOUNTER (HCC): ICD-10-CM

## 2022-05-02 DIAGNOSIS — M54.50 ACUTE RIGHT-SIDED LOW BACK PAIN WITHOUT SCIATICA: ICD-10-CM

## 2022-05-02 DIAGNOSIS — S22.31XG CLOSED FRACTURE OF ONE RIB OF RIGHT SIDE WITH DELAYED HEALING, SUBSEQUENT ENCOUNTER: ICD-10-CM

## 2022-05-02 PROCEDURE — 72100 X-RAY EXAM L-S SPINE 2/3 VWS: CPT

## 2022-05-02 PROCEDURE — 99214 OFFICE O/P EST MOD 30 MIN: CPT | Performed by: FAMILY MEDICINE

## 2022-05-02 RX ORDER — NAPROXEN 500 MG/1
500 TABLET ORAL 2 TIMES DAILY PRN
Qty: 60 TABLET | Refills: 0 | Status: SHIPPED | OUTPATIENT
Start: 2022-05-02 | End: 2022-06-12 | Stop reason: ALTCHOICE

## 2022-05-02 NOTE — PATIENT INSTRUCTIONS
F/u after MRI  MRI Lumbar spine-  Lower back pain, CT shows displaced L1-4 transverse process fx  Continued pain  Icing/heat/OTC pain meds as needed  Naproxen as needed  Tylenol as needed

## 2022-05-02 NOTE — PROGRESS NOTES
[facility] [place]  [address]  [place] [address] [id number] [phone]      Chief Complaint:  Chief Complaint   Patient presents with    Lower Back - Pain       Vitals:  /79 (BP Location: Left arm, Patient Position: Sitting, Cuff Size: Large)   Pulse 88   Temp 97 5 °F (36 4 °C) (Tympanic)   Ht 5' 11" (1 803 m)   Wt 135 kg (297 lb)   BMI 41 42 kg/m²     The following portions of the patient's history were reviewed and updated as appropriate: allergies, current medications, past family history, past medical history, past social history, past surgical history, and problem list       Subjective:   Patient ID: Ramy Sosa is a 54 y o  male  Here for f/u lower back pain and rib fracture  His pain is getting worse  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  He went back to work and felt terrible pain the first day he returned to work and almost quit the day  He has been having pain for several months working full duty= 50-55 hrs/week  Taking tylenol PRN  Pain with bending/twisting/prolonged standing  Prolonged pain- sharp pain/dull pain      CT Abdomen 1/9/22:  Nondisplaced right 12th rib fracture and multiple displaced right transverse process fracture from L1 to L4   No hematoma  Subcentimeter hypodensities in the spleen most likely represent small cyst or hemangioma   Ultrasound characterization on nonemergent basis is recommended         ASSESSMENT/PLAN:  Right 12th rib fracture  L1-4 displaced transverse process fractures      Review of Systems   Constitutional: Negative for fatigue and fever  Respiratory: Negative for shortness of breath  Cardiovascular: Negative for chest pain  Gastrointestinal: Negative for abdominal pain and nausea  Genitourinary: Negative for dysuria  Musculoskeletal: Positive for back pain  Skin: Negative for rash and wound     Neurological: Negative for weakness and headaches  Objective:  Back Exam     Tenderness   The patient is experiencing tenderness in the lumbar  Muscle Strength   The patient has normal back strength  Reflexes   Patellar: normal    Comments:  Pain with flex/ext/rot B/lat flex B  Dec ROM  Due to pain            Physical Exam  Constitutional:       Appearance: Normal appearance  He is normal weight  Eyes:      Extraocular Movements: Extraocular movements intact  Pulmonary:      Effort: Pulmonary effort is normal    Musculoskeletal:      Cervical back: Normal range of motion  Skin:     General: Skin is warm and dry  Neurological:      General: No focal deficit present  Mental Status: He is alert and oriented to person, place, and time  Mental status is at baseline  Psychiatric:         Mood and Affect: Mood normal          Behavior: Behavior normal          Thought Content: Thought content normal          Judgment: Judgment normal          I have personally reviewed pertinent films in PACS and my interpretation is XR L spine- possible displaced fx of R L1-4 transverse process? Irl Ross Assessment/Plan:  Assessment/Plan   Diagnoses and all orders for this visit:    Closed fracture of transverse process of lumbar vertebra with delayed healing, subsequent encounter  -     XR spine lumbar 2 or 3 views injury; Future  -     naproxen (Naprosyn) 500 mg tablet; Take 1 tablet (500 mg total) by mouth 2 (two) times a day as needed for mild pain  -     MRI lumbar spine wo contrast; Future  -     Lso Back Brace    Closed fracture of one rib of right side with delayed healing, subsequent encounter  -     XR spine lumbar 2 or 3 views injury; Future  -     naproxen (Naprosyn) 500 mg tablet; Take 1 tablet (500 mg total) by mouth 2 (two) times a day as needed for mild pain  -     MRI lumbar spine wo contrast; Future  -     Lso Back Brace    Acute right-sided low back pain without sciatica  -     naproxen (Naprosyn) 500 mg tablet;  Take 1 tablet (500 mg total) by mouth 2 (two) times a day as needed for mild pain  -     MRI lumbar spine wo contrast; Future  -     Lso Back Brace    Closed fracture of one rib of right side, initial encounter  -     Lso Back Brace        Return for f/u after MRI Lumbar spine, Recheck       Chana Frank MD

## 2022-05-09 ENCOUNTER — TELEPHONE (OUTPATIENT)
Dept: OBGYN CLINIC | Facility: CLINIC | Age: 52
End: 2022-05-09

## 2022-05-09 ENCOUNTER — TELEPHONE (OUTPATIENT)
Dept: OBGYN CLINIC | Facility: HOSPITAL | Age: 52
End: 2022-05-09

## 2022-05-09 NOTE — TELEPHONE ENCOUNTER
Dr Teodoro Crigler    Patient got a letter from his insurance stating MRI denied due to no PT        # [phone]

## 2022-05-13 ENCOUNTER — HOSPITAL ENCOUNTER (OUTPATIENT)
Dept: MRI IMAGING | Facility: HOSPITAL | Age: 52
Discharge: HOME | End: 2022-05-13
Attending: FAMILY MEDICINE
Payer: COMMERCIAL

## 2022-05-13 ENCOUNTER — HOSPITAL ENCOUNTER (OUTPATIENT)
Dept: RADIOLOGY | Facility: HOSPITAL | Age: 52
Discharge: HOME | End: 2022-05-13
Attending: FAMILY MEDICINE

## 2022-05-13 DIAGNOSIS — S32.009G CLOSED FRACTURE OF TRANSVERSE PROCESS OF LUMBAR VERTEBRA WITH DELAYED HEALING, SUBSEQUENT ENCOUNTER: ICD-10-CM

## 2022-05-13 DIAGNOSIS — S22.31XG CLOSED FRACTURE OF ONE RIB OF RIGHT SIDE WITH DELAYED HEALING, SUBSEQUENT ENCOUNTER: ICD-10-CM

## 2022-05-13 DIAGNOSIS — M54.50 ACUTE RIGHT-SIDED LOW BACK PAIN WITHOUT SCIATICA: ICD-10-CM

## 2022-05-13 PROCEDURE — 72148 MRI LUMBAR SPINE W/O DYE: CPT

## 2022-05-13 PROCEDURE — G1004 CDSM NDSC: HCPCS

## 2022-05-19 ENCOUNTER — OFFICE VISIT (OUTPATIENT)
Dept: OBGYN CLINIC | Facility: CLINIC | Age: 52
End: 2022-05-19
Payer: COMMERCIAL

## 2022-05-19 VITALS
SYSTOLIC BLOOD PRESSURE: 135 MMHG | WEIGHT: 294 LBS | BODY MASS INDEX: 41.16 KG/M2 | HEIGHT: 71 IN | TEMPERATURE: 98 F | DIASTOLIC BLOOD PRESSURE: 85 MMHG | HEART RATE: 87 BPM

## 2022-05-19 DIAGNOSIS — S22.31XG CLOSED FRACTURE OF ONE RIB OF RIGHT SIDE WITH DELAYED HEALING, SUBSEQUENT ENCOUNTER: ICD-10-CM

## 2022-05-19 DIAGNOSIS — S32.009G CLOSED FRACTURE OF TRANSVERSE PROCESS OF LUMBAR VERTEBRA WITH DELAYED HEALING, SUBSEQUENT ENCOUNTER: Primary | ICD-10-CM

## 2022-05-19 DIAGNOSIS — M48.061 SPINAL STENOSIS OF LUMBAR REGION, UNSPECIFIED WHETHER NEUROGENIC CLAUDICATION PRESENT: ICD-10-CM

## 2022-05-19 DIAGNOSIS — M54.50 ACUTE RIGHT-SIDED LOW BACK PAIN WITHOUT SCIATICA: ICD-10-CM

## 2022-05-19 DIAGNOSIS — M51.26 BULGING LUMBAR DISC: ICD-10-CM

## 2022-05-19 PROCEDURE — 99214 OFFICE O/P EST MOD 30 MIN: CPT | Performed by: FAMILY MEDICINE

## 2022-05-19 NOTE — PROGRESS NOTES
[facility] [place] [address]  [facility] [id number] [phone]      Chief Complaint:  Chief Complaint   Patient presents with    Lower Back - Follow-up       Vitals:  /85 (BP Location: Left arm, Patient Position: Sitting, Cuff Size: Large)   Pulse 87   Temp 98 °F (36 7 °C) (Tympanic)   Ht 5' 11" (1 803 m)   Wt 133 kg (294 lb)   BMI 41 00 kg/m²     The following portions of the patient's history were reviewed and updated as appropriate: allergies, current medications, past family history, past medical history, past social history, past surgical history, and problem list       Subjective:   Patient ID: Tara Zacarias is a 54 y o  male  Here for f/u lower back pain and rib fracture/MRI  Back brace is helping- decreasing pain  Worse without back brace  Taking naproxen- helps  Pain with bending/twisting/prolonged standing  Prolonged pain- sharp pain/dull pain    MRI LUMBAR SPINE WITHOUT CONTRAST     INDICATION: S32 [id number]: Unspecified fracture of unspecified lumbar vertebra, subsequent encounter for fracture with delayed healing  S22  31XG: Fracture of one rib, right side, subsequent encounter for fracture with delayed healing  M54 50: Low back pain, unspecified      COMPARISON:  None      TECHNIQUE:  Sagittal T1, sagittal T2, sagittal inversion recovery, axial T1 and axial T2, coronal T2     IMAGE QUALITY:  Diagnostic     FINDINGS:     VERTEBRAL BODIES:  There are 5 lumbar type vertebral bodies  Normal alignment of the lumbar spine  No spondylolysis or spondylolisthesis  No scoliosis  No compression fracture  Normal marrow signal is identified within the visualized bony   structures  No discrete marrow lesion      Mild developmental canal stenosis within the lower thoracic canal and throughout the lumbar canal      SACRUM:  Normal signal within the sacrum   No evidence of insufficiency or stress fracture      DISTAL CORD AND CONUS: Normal size and signal within the distal cord and conus  There does appear to be mild fatty infiltration of the filum terminale, without significant thickening      PARASPINAL SOFT TISSUES:  There is a cyst within the hilar region of the left kidney, only partially evaluated on this examination      LOWER THORACIC DISC SPACES:  Developmental spinal canal stenosis with no disc herniation  No foraminal nerve compression      LUMBAR DISC SPACES:  Developmental canal stenosis  Normal disc height and signal      L1-L2:  No disc herniation  Mild developmental canal stenosis and foraminal narrowing without nerve impingement      L2-L3:  No disc herniation  Mild developmental canal stenosis and foraminal narrowing without nerve impingement      L3-L4:  Mild annular bulging  Mild developmental canal stenosis and foraminal narrowing without nerve impingement      L4-L5:  Normal disc height and signal   Minimal annular bulging and mild facet arthropathy  Mild developmental canal stenosis and foraminal narrowing without nerve impingement      L5-S1:  Normal      IMPRESSION:     Developmental canal stenosis within the lower thoracic canal and throughout the majority of the lumbar canal with slight annular bulging and facet arthropathy  No discrete disc herniation or nerve impingement          Review of Systems   Constitutional: Negative for fatigue and fever  Respiratory: Negative for shortness of breath  Cardiovascular: Negative for chest pain  Gastrointestinal: Negative for abdominal pain and nausea  Genitourinary: Negative for dysuria  Musculoskeletal: Positive for back pain  Skin: Negative for rash and wound  Neurological: Negative for weakness and headaches  Objective:  Back Exam     Tenderness   The patient is experiencing tenderness in the lumbar and thoracic  Muscle Strength   The patient has normal back strength      Comments:  Pain with flex/ext/rot B/lat flex B            Physical Exam  Constitutional:       Appearance: Normal appearance  He is normal weight  Eyes:      Extraocular Movements: Extraocular movements intact  Pulmonary:      Effort: Pulmonary effort is normal    Musculoskeletal:         General: Tenderness present  Cervical back: Normal range of motion  Skin:     General: Skin is warm and dry  Neurological:      General: No focal deficit present  Mental Status: He is alert and oriented to person, place, and time  Mental status is at baseline  Psychiatric:         Mood and Affect: Mood normal          Behavior: Behavior normal          Thought Content: Thought content normal          Judgment: Judgment normal          I have personally reviewed pertinent films in PACS and my interpretation is MRI L spine-  canal stenosis  L3/4, 4/5 bulging disc  Assessment/Plan:  Assessment/Plan   Diagnoses and all orders for this visit:    Closed fracture of transverse process of lumbar vertebra with delayed healing, subsequent encounter    Closed fracture of one rib of right side with delayed healing, subsequent encounter    Acute right-sided low back pain without sciatica        No follow-ups on file       Latonia Gaines MD

## 2022-06-12 ENCOUNTER — OFFICE VISIT (OUTPATIENT)
Dept: URGENT CARE | Facility: CLINIC | Age: 52
End: 2022-06-12
Payer: COMMERCIAL

## 2022-06-12 VITALS — TEMPERATURE: 97.8 F | OXYGEN SATURATION: 98 % | HEART RATE: 84 BPM | RESPIRATION RATE: 18 BRPM

## 2022-06-12 DIAGNOSIS — R05.1 ACUTE COUGH: ICD-10-CM

## 2022-06-12 DIAGNOSIS — U07.1 COVID: Primary | ICD-10-CM

## 2022-06-12 LAB
SARS-COV-2 AG UPPER RESP QL IA: POSITIVE
VALID CONTROL: ABNORMAL

## 2022-06-12 PROCEDURE — 87811 SARS-COV-2 COVID19 W/OPTIC: CPT | Performed by: NURSE PRACTITIONER

## 2022-06-12 PROCEDURE — 99213 OFFICE O/P EST LOW 20 MIN: CPT | Performed by: NURSE PRACTITIONER

## 2022-06-12 RX ORDER — ALBUTEROL SULFATE 90 UG/1
2 AEROSOL, METERED RESPIRATORY (INHALATION) EVERY 4 HOURS PRN
Qty: 18 G | Refills: 1 | Status: SHIPPED | OUTPATIENT
Start: 2022-06-12 | End: 2022-08-07

## 2022-06-12 RX ORDER — BUDESONIDE 90 UG/1
2 AEROSOL, POWDER RESPIRATORY (INHALATION) 2 TIMES DAILY
Qty: 1 EACH | Refills: 1 | Status: SHIPPED | OUTPATIENT
Start: 2022-06-12

## 2022-06-12 NOTE — PATIENT INSTRUCTIONS
Hold your lipitor and the viagra for the next 7 days  Read through the Paxlovid information but I would recommend that you take it due to risk of covid progression  Start the budosenide/pulmicort steroid inhaler rinsing mouth (and spitting out) after each use  Use the albuterol inhaler every 4-6 hours as needed for shortness of breath, chest tightness, wheezing or persistent cough  COVID-19 and Chronic Health Conditions   AMBULATORY CARE:   What you need to know about coronavirus disease 2019 (COVID-19) and chronic health conditions: Your chronic condition may increase your risk for COVID-19 or serious problems it can cause  Healthcare providers might need to make changes that affect how you usually manage your chronic health condition  Providers may change hours of operation or not have patients come in to be seen  You may not be able to make appointments to get blood drawn or to have tests or procedures  This may continue until the virus that causes COVID-19 is controlled  Until then, you can take steps to manage your condition  The steps will also lower your risk for COVID-19 or the serious problems it causes  If you do develop COVID-19, healthcare providers will tell you when it is okay to be around others after you recover  This depends on your chronic condition, any symptoms of COVID-19 that developed, and how severe the symptoms were  What you need to know about serious problems from the virus:  You may develop long-term health problems caused by the virus  Your risk is higher if you are 65 or older  A weak immune system, obesity, diabetes, chronic kidney disease, or a heart or lung condition can also increase your risk  Your risk is also higher if you are a current or former cigarette smoker   COVID-19 can lead to any of the following:  Multisymptom inflammatory syndrome in adults (MIS-A) or in children (MIS-C), causing inflammation in the heart, digestive system, skin, or brain    Shortness of breath, serious lower respiratory conditions, such as pneumonia or acute respiratory distress syndrome (ARDS)    Blood clots or blood vessel damage    Organ damage from a lack of oxygen or from blood clots    Sleep problems    Problems thinking clearly, remembering information, or concentrating    Mood changes, depression, or anxiety    Long-term problems tasting or smelling    Loss of appetite and weight loss    Nerve pain    Fatigue (feeling mentally and physically tired)    Call your local emergency number (911 in the [address] [facility]) if:   You have trouble breathing or shortness of breath  You have chest pain or pressure that lasts longer than 5 minutes  You become confused or hard to wake  Your lips or face are blue  Seek care immediately if:   You have a fever of 104°F (40°C) or higher  Call your doctor or healthcare provider if:   You have symptoms of COVID-19  You have questions or concerns about COVID-19 or your chronic condition  How the 2019 coronavirus spreads: The virus spreads quickly and easily  The virus can be passed starting 2 to 3 days before symptoms begin or before a positive test if symptoms never begin  Droplets are the main way all coronaviruses spread  The virus travels in droplets that form when a person talks, sings, coughs, or sneezes  The droplets can also float in the air for minutes or hours  Infection happens when you breathe in the droplets or get them in your eyes or nose  Close personal contact with an infected person increases your risk for infection  This means being within 6 feet (2 meters) of the person for at least 15 minutes over 24 hours  Person-to-person contact can spread the virus  For example, a person with the virus on his or her hands can spread it by shaking hands with someone  The virus can stay on objects and surfaces for up to 3 days  You may become infected by touching the object or surface and then touching your eyes or mouth      Manage your chronic health condition during this time:  If you do not have a regular healthcare provider, experts recommend you contact a local [facility] health center or health department  The following can help you manage your condition and prevent COVID-19:  Get emergency care for your condition if needed  Talk to your healthcare providers about symptoms of your chronic condition that need immediate care  Your providers can help you create a plan or add exacerbation management to your plan  The plan will include when to go to an emergency department and when to call your local emergency number  This will depend on where you live and the services that are available during this time  Go to dialysis appointments as scheduled  It is important to stay on schedule  You will need to have enough food to be able to follow the emergency diet plan if you must miss a session  The emergency diet needs to be part of the management plan for your condition  Reschedule any upcoming appointments as needed  Medical facilities may be closed until the coronavirus is better controlled  This means you may need to reschedule a surgery, procedure, or check-up appointment  If you cannot have a phone or video appointment, you will need to make a new appointment  Some providers may be scheduling appointments several months in advance  Some surgeries and procedures will happen as scheduled  This depends on the medical condition and the reason for the surgery or procedure  You may need to have extra testing for COVID-19 several days before  Follow any regular management plan you use  Your healthcare provider will tell you if you need to make any changes to your regular management plan  For example, if you have asthma, continue to follow your asthma action plan  If you have diabetes, you may need to check your blood sugar level more often  Stress and illness can make blood sugar levels go up  You may need to adjust medicine such as insulin   If you have a heart condition or high blood pressure, you may need to check your blood pressure more often  Stress and illness can also raise your blood pressure  Talk to your healthcare providers about your medicines  You may be able to get more than 1 month of medicine at a time  This will lower the number of times you need to go to a pharmacy to get your medicines  Make sure you have enough medicine if you have a condition that can lead to an emergency  Examples include asthma medicines, insulin, or an epinephrine pen  Check the expiration dates on the medicines you currently have  Ask for refills as soon as possible, if needed  If it is not time to refill prescriptions, you may be able to get an emergency supply of some medicines  Medicine plans vary, so ask your healthcare provider or pharmacist for options  Have supplies available in your home  If possible, get extra supplies you use regularly  Examples include absorbent pads, syringes, and wound cleaning solutions  This will limit the number of trips out of your home to get supplies  Know the signs and symptoms of COVID-19  Signs and symptoms usually start about 5 days after infection but can take 2 to 14 days  Signs and symptoms range from mild to severe  You may feel like you have the flu or a bad cold  Your chronic health condition may cause some of the same symptoms COVID-19 causes  This can make it hard to know if a symptom is from COVID-19 or your chronic condition  Keep a record of any new or worsening symptom you have  This is especially important if you have a condition that often causes shortness of breath  Your provider can tell you if you should be tested for COVID-19   Tell your healthcare provider if you think you were infected but develop signs or symptoms not listed below:    A cough    Shortness of breath or trouble breathing that may become severe    A fever of at least 100 4°F, or 38°C (may be lower in adults 65 or older)    Chills that might include shaking    Muscle pain, body aches, or a headache    A sore throat    Suddenly not being able to taste or smell anything    Feeling very tired (fatigue)    Congestion (stuffy head and nose), or a runny nose    Diarrhea, nausea, or vomiting    What you can do to prevent having to go out of your home during this time:   Ask your healthcare provider for other ways to have appointments  Some providers offer phone, video, or other types of appointments  Have food, medicines, and other supplies delivered  Some pharmacies can send certain medicines to you through the mail  Grocery stores and restaurants may be able to deliver food and other items  If possible, have delivered items placed somewhere  Try not to have someone hand you an item  You will be so close to the person that the virus can spread between you  Ask someone to get items you need  The person can get groceries, medicines, or other needed items for you  Choose a person who does not have signs or symptoms of COVID-19 or has tested negative for it  The person should not be waiting for test results  He or she should not have a condition that increases the risk for COVID-19 or serious problems it causes  What you need to know about COVID-19 vaccines: Your healthcare provider can give you more information about what to expect, depending on your chronic condition  You are considered fully vaccinated against COVID-19 two weeks after the final dose of any COVID-19 vaccine  Let your healthcare provider know when you have received the final dose of the vaccine  Make a copy of your vaccination card  Keep the original with you in case you need to show it  Keep the copy in a safe place  COVID-19 vaccines are given as a shot in 1 or 2 doses  Vaccination is recommended for everyone 5 years or older  One 2-dose vaccine is fully approved  for those 16 years or older   This vaccine also has an emergency use authorization (EUA) for children 5 to 13years old  No vaccine is currently available for children younger than 5 years  A booster (additional) dose  is given to help the immune system continue to protect against severe COVID-19  A booster is recommended for all adults 18 or older  The booster can be a different brand of the COVID-19 vaccine than you originally received  The timing for the booster depends on the type of vaccine you received:    1-dose vaccine: The booster is given at least 2 months after you received the vaccine  2-dose vaccine: The booster is given at least 5 or 6 months after the second dose  A booster can be given to adolescents 15to 16years old  Only 1 COVID-19 vaccine has this EUA  The booster is given at least 5 months after the second dose of the original vaccine series  A booster is recommended for immunocompromised children 11to 6years old  Only 1 COVID-19 vaccine has this EUA  The booster is given 28 days after the second dose  Continue social distancing and other measures, even after you get the vaccine  Although it is not common, you can become infected after you get the vaccine  You may also be able to pass the virus to others without knowing you are infected  After you get the vaccine, check local, national, and international travel rules  You may need to be tested before you travel  Some countries require proof of a negative test before you travel  You may also need to quarantine after you return  Medicine may be given to prevent infection  The medicine can be given if you are at high risk for infection and cannot get the vaccine  It can also be given if your immune system does not respond well to the vaccine  Lower your risk for COVID-19:   Wash your hands often throughout the day  Use soap and water  Rub your soapy hands together, lacing your fingers, for at least 20 seconds  Rinse with warm, running water  Dry your hands with a clean towel or paper towel   Use hand  that contains alcohol if soap and water are not available  Teach children how to wash their hands and use hand   Cover sneezes and coughs  Turn your face away and cover your mouth and nose with a tissue  Throw the tissue away  Use the bend of your arm if a tissue is not available  Then wash your hands with soap and water or use hand   Teach children how to cover a cough or sneeze  Follow worldwide, national, and local social distancing guidelines  Keep at least 6 feet (2 meters) between you and others  Wear a face covering (mask) around anyone who does not live in your home  Use a cloth covering with at least 2 layers  You can also create layers by putting a cloth covering over a disposable non-medical mask  Cover your mouth and your nose  Try not to touch your face  If you get the virus on your hands, you can transfer it to your eyes, nose, or mouth and become infected  You can also transfer it to objects, surfaces, or people  Clean and disinfect high-touch surfaces and objects in your home often  Use disinfecting wipes, or make a solution by mixing 4 teaspoons of bleach with 1 quart (4 cups) of water  Do not  use any cleaning or disinfecting products that can trigger an asthma attack or other breathing problems  Open windows or have circulating air as you clean  Do not  mix ammonia with bleach  This will create toxic fumes  How to follow social distancing guidelines:  National and local social distancing rules vary  Rules and restrictions may change over time as restrictions are lifted  The following are general guidelines:  Stay home if you are sick or think you may have COVID-19  It is important to stay home if you are waiting for a testing appointment or for test results  Avoid close physical contact with anyone who does not live in your home  Do not shake hands with, hug, or kiss a person as a greeting   If you must use public transportation (such as a bus or subway), try to sit or stand away from others  Wear your face covering  Avoid in-person gatherings and crowds  Attend virtually if possible  Help strengthen your immune system:   Ask about other vaccines you may need  Get the influenza (flu) vaccine as soon as recommended each year, usually starting in September or October  Get the pneumonia vaccine if recommended  Your healthcare provider can tell you if you should also get other vaccines, and when to get them  Do not smoke  Nicotine and other chemicals in cigarettes and cigars can increase your risk for infection and for serious COVID-19 effects  Ask your healthcare provider for information if you currently smoke and need help to quit  E-cigarettes or smokeless tobacco still contain nicotine  Talk to your healthcare provider before you use these products  Eat a variety of healthy foods  Examples include vegetables, fruits, whole-grain breads and cereals, lean meats and poultry, fish, low-fat dairy products, and cooked beans  Healthy foods contain nutrients that help keep your immune system strong  Find ways to manage stress  You may be feeling more stressed than usual because of the COVID-19 outbreak  The situation is very stressful to many people  Talk to your healthcare providers about ways to manage stress during this time  Stress can lead to breathing problems or make the problems worse  Stress can trigger an attack or exacerbation of many health conditions  It is important to do things that help you feel more relaxed, such as the following:     Pick 1 or 2 times a day to watch the news  Constant news watching can increase your stress levels  Talk to a friend on the phone or through a video chat  Take a warm, soothing bath  Listen to music  Exercise can also help relieve stress  This may be hard if your regular gym or outdoor exercise area is closed   If you do not have exercise equipment at home, try walking inside your home  You can walk quickly or turn on music and dance  Follow up with your doctor or healthcare provider as directed: Your providers will tell you when you can come in for tests, procedures, or check-ups  Bring your symptom record with you to all appointments  Write down your questions so you remember to ask them during your visits  For more information:   [facility]  [address] , [address]  Phone: 6- 046 - 1717800  Phone: 9- 355 - 9719886  Web Address: [employer] com br    © Copyright [employer] 2022 Information is for End User's use only and may not be sold, redistributed or otherwise used for commercial purposes  All illustrations and images included in CareNotes® are the copyrighted property of [employer]  or Karen Lo  The above information is an  only  It is not intended as medical advice for individual conditions or treatments  Talk to your doctor, nurse or pharmacist before following any medical regimen to see if it is safe and effective for you

## 2022-06-12 NOTE — LETTER
June 12, 2022     Patient: Rodger Alexis   YOB: 1970   Date of Visit: 6/12/2022       To Whom It May Concern: It is my medical opinion that Rodger Alexis should remain out of work until 6/16 with strict masking up to and including 6/20  Patient is positive for covid  If you have any questions or concerns, please don't hesitate to call           Sincerely,        ADELSO Mar    CC: No Recipients

## 2022-07-01 DIAGNOSIS — I21.9 TYPE 1 MYOCARDIAL INFARCTION (HCC): ICD-10-CM

## 2022-07-01 DIAGNOSIS — I21.02 ACUTE ST ELEVATION MYOCARDIAL INFARCTION (STEMI) DUE TO OCCLUSION OF MID PORTION OF LEFT ANTERIOR DESCENDING (LAD) CORONARY ARTERY (HCC): ICD-10-CM

## 2022-07-01 RX ORDER — ASPIRIN 81 MG/1
TABLET, COATED ORAL
Qty: 90 TABLET | Refills: 3 | Status: SHIPPED | OUTPATIENT
Start: 2022-07-01

## 2022-07-01 RX ORDER — CLOPIDOGREL BISULFATE 75 MG/1
TABLET ORAL
Qty: 90 TABLET | Refills: 3 | Status: SHIPPED | OUTPATIENT
Start: 2022-07-01

## 2022-07-01 RX ORDER — LISINOPRIL 5 MG/1
TABLET ORAL
Qty: 90 TABLET | Refills: 3 | Status: SHIPPED | OUTPATIENT
Start: 2022-07-01

## 2022-07-01 NOTE — TELEPHONE ENCOUNTER
Requested medication(s) are due for refill today: Yes  Patient has already received a courtesy refill: No  Other reason request has been forwarded to provider: estela all pertinent systems normal

## 2022-07-27 ENCOUNTER — TELEPHONE (OUTPATIENT)
Dept: CARDIOLOGY CLINIC | Facility: HOSPITAL | Age: 52
End: 2022-07-27

## 2022-07-27 NOTE — TELEPHONE ENCOUNTER
Contacted patient to schedule 6 month follow up appt does not have time off to come,will call if he needs appt in future

## 2022-08-03 DIAGNOSIS — U07.1 COVID: ICD-10-CM

## 2022-08-03 DIAGNOSIS — R05.1 ACUTE COUGH: ICD-10-CM

## 2022-08-07 RX ORDER — ALBUTEROL SULFATE 90 UG/1
AEROSOL, METERED RESPIRATORY (INHALATION)
Qty: 8.5 G | Refills: 1 | Status: SHIPPED | OUTPATIENT
Start: 2022-08-07

## 2022-08-23 ENCOUNTER — OFFICE VISIT (OUTPATIENT)
Dept: URGENT CARE | Facility: CLINIC | Age: 52
End: 2022-08-23
Payer: COMMERCIAL

## 2022-08-23 VITALS
HEART RATE: 82 BPM | HEIGHT: 71 IN | BODY MASS INDEX: 40.6 KG/M2 | OXYGEN SATURATION: 96 % | WEIGHT: 290 LBS | TEMPERATURE: 98.1 F | RESPIRATION RATE: 18 BRPM

## 2022-08-23 DIAGNOSIS — S61.412A LACERATION OF LEFT HAND WITHOUT FOREIGN BODY, INITIAL ENCOUNTER: Primary | ICD-10-CM

## 2022-08-23 PROCEDURE — 99213 OFFICE O/P EST LOW 20 MIN: CPT | Performed by: PHYSICIAN ASSISTANT

## 2022-08-23 NOTE — PATIENT INSTRUCTIONS
Continue Keflex as prescribed  Return in 10-11 days for suture removal   Keep clean with warm water and soap  Cover if the wound can get dirty  Can apply antibiotic ointment  Leave open to the air at home

## 2022-08-23 NOTE — LETTER
August 23, 2022     Patient: Negrito Garcia   YOB: 1970   Date of Visit: 8/23/2022       To Whom It May Concern: It is my medical opinion that Negrito Garcia can return to work with no restrictions  If you have any questions or concerns, please don't hesitate to call           Sincerely,        Deanna Chávez PA-C    CC: Negrito Lucerosachin

## 2022-08-23 NOTE — PROGRESS NOTES
[employer] [facility]    NAME: Jefry Villasenor is a 54 y o  male  : 1970    MRN: [id number]  DATE: 2022  TIME: 1:01 PM    Assessment and Plan   Laceration of left hand without foreign body, initial encounter [S61 [id number]]  1  Laceration of left hand without foreign body, initial encounter         Patient Instructions     Patient Instructions   Continue Keflex as prescribed  Return in 10-11 days for suture removal   Keep clean with warm water and soap  Cover if the wound can get dirty  Can apply antibiotic ointment  Leave open to the air at home  Chief Complaint     Chief Complaint   Patient presents with    Wound Check     Were placed on Saturday        History of Present Illness   68-year-old male here for wound check to of had laceration of his left hand  This is sustained 3 days ago and he had it sutured at another urgent care  He wanted to have it checked and needs a note to return to work  There is no redness  No drainage from the wound  He has full range of motion of his hand  Minimal swelling  Review of Systems   Review of Systems   Constitutional: Negative for chills and fever  Respiratory: Negative for cough and shortness of breath  Cardiovascular: Negative for chest pain  Skin: Positive for wound         Current Medications     Current Outpatient Medications:     albuterol (PROVENTIL HFA,VENTOLIN HFA) 90 mcg/act inhaler, inhale 2 puffs by mouth and INTO THE LUNGS every 4 hours if needed for cough shortness of breath or wheezing, Disp: 8 5 g, Rfl: 1    Aspirin Low Dose 81 MG EC tablet, take 1 tablet by mouth once daily, Disp: 90 tablet, Rfl: 3    atorvastatin (LIPITOR) 40 mg tablet, Take 1 tablet (40 mg total) by mouth daily with dinner, Disp: 90 tablet, Rfl: 3    budesonide (Pulmicort Flexhaler) 90 MCG/ACT inhaler, Inhale 2 puffs 2 (two) times a day Rinse mouth after use , Disp: 1 each, Rfl: 1    chlorhexidine (PERIDEX) 0 12 % solution, Apply 15 mL to the mouth or throat 2 (two) times a day (Patient not taking: No sig reported), Disp: 120 mL, Rfl: 0    clopidogrel (PLAVIX) 75 mg tablet, take 1 tablet by mouth once daily, Disp: 90 tablet, Rfl: 3    lisinopril (ZESTRIL) 5 mg tablet, take 1 tablet by mouth once daily, Disp: 90 tablet, Rfl: 3    metoprolol succinate (TOPROL-XL) 50 mg 24 hr tablet, Take 1 tablet (50 mg total) by mouth daily, Disp: 30 tablet, Rfl: 6    nitroglycerin (NITROSTAT) 0 4 mg SL tablet, Place 1 tablet (0 4 mg total) under the tongue every 5 (five) minutes as needed for chest pain (Patient not taking: No sig reported), Disp: 30 tablet, Rfl: 3    Respiratory Therapy Supplies (Spirometer) KIT, Use 4 (four) times a day (Patient not taking: No sig reported), Disp: 1 kit, Rfl: 0    sildenafil (VIAGRA) 25 MG tablet, Take 1 tablet (25 mg total) by mouth daily as needed for erectile dysfunction (Patient not taking: No sig reported), Disp: 10 tablet, Rfl: 3    Current Allergies     Allergies as of 08/23/2022 - Reviewed 08/23/2022   Allergen Reaction Noted    Bupropion Itching 01/21/2019          The following portions of the patient's history were reviewed and updated as appropriate: allergies, current medications, past family history, past medical history, past social history, past surgical history and problem list    Past Medical History:   Diagnosis Date    Heart attack ([facility] [address] ) 06/28/2021     Past Surgical History:   Procedure Laterality Date    CORONARY ANGIOPLASTY      FINGER FRACTURE SURGERY      MOUTH SURGERY       Family History   Problem Relation Age of Onset    Hypertension Mother     Breast cancer Mother     Heart attack Father     Hyperlipidemia Father     Hyperlipidemia Sister     Heart disease Maternal Grandmother     Heart disease Maternal Grandfather     Heart attack Maternal Grandfather      Social History     Socioeconomic History    Marital status: Single     Spouse name: Not on file    Number of children: Not on file    Years of education: Not on file    Highest education level: Not on file   Occupational History    Not on file   Tobacco Use    Smoking status: Former Smoker     Packs/day: 2 00     Years: 22 00     Pack years: 44 00     Quit date: 2021     Years since quittin 1    Smokeless tobacco: Never Used   Vaping Use    Vaping Use: Every day    Substances: Nicotine   Substance and Sexual Activity    Alcohol use: Not Currently    Drug use: Not Currently     Types: Marijuana     Comment:  last used    Sexual activity: Not on file   Other Topics Concern    Not on file   Social History Narrative    Not on file     Social Determinants of Health     Financial Resource Strain: Not on file   Food Insecurity: Not on file   Transportation Needs: Not on file   Physical Activity: Not on file   Stress: Not on file   Social Connections: Not on file   Intimate Partner Violence: Not on file   Housing Stability: Not on file     Medications have been verified  Objective   Pulse 82   Temp 98 1 °F (36 7 °C)   Resp 18   Ht 5' 10 5" (1 791 m)   Wt 132 kg (290 lb)   SpO2 96%   BMI 41 02 kg/m²      Physical Exam   Physical Exam  Vitals and nursing note reviewed  Constitutional:       Appearance: Normal appearance  Cardiovascular:      Rate and Rhythm: Normal rate  Pulses: Normal pulses  Pulmonary:      Effort: Pulmonary effort is normal    Musculoskeletal:      Left hand: Laceration present  No swelling or tenderness  Normal range of motion  Normal strength  Normal sensation  Normal capillary refill  Normal pulse  Hands:       Cervical back: Normal range of motion  Comments: Sutures intact  Laceration appears to be healing well  Edges are well approximated  There is no redness and no drainage from the wound  Neurological:      Mental Status: He is alert

## 2022-09-19 DIAGNOSIS — N52.2 DRUG-INDUCED ERECTILE DYSFUNCTION: ICD-10-CM

## 2022-09-19 DIAGNOSIS — I25.10 CORONARY ARTERY DISEASE INVOLVING NATIVE CORONARY ARTERY OF NATIVE HEART WITHOUT ANGINA PECTORIS: ICD-10-CM

## 2022-09-19 RX ORDER — METOPROLOL SUCCINATE 50 MG/1
50 TABLET, EXTENDED RELEASE ORAL DAILY
Qty: 90 TABLET | Refills: 3 | Status: SHIPPED | OUTPATIENT
Start: 2022-09-19

## 2022-09-19 NOTE — TELEPHONE ENCOUNTER
Requested medication(s) are due for refill today: yes  Patient has already received a courtesy refill: no  Other reason request has been forwarded to provider: needs OV

## 2022-11-02 ENCOUNTER — OFFICE VISIT (OUTPATIENT)
Dept: URGENT CARE | Facility: CLINIC | Age: 52
End: 2022-11-02

## 2022-11-02 ENCOUNTER — APPOINTMENT (OUTPATIENT)
Dept: RADIOLOGY | Facility: CLINIC | Age: 52
End: 2022-11-02

## 2022-11-02 VITALS
OXYGEN SATURATION: 99 % | BODY MASS INDEX: 41.02 KG/M2 | RESPIRATION RATE: 18 BRPM | WEIGHT: 290 LBS | TEMPERATURE: 97.5 F | HEART RATE: 95 BPM

## 2022-11-02 DIAGNOSIS — J20.9 ACUTE BRONCHITIS, UNSPECIFIED ORGANISM: Primary | ICD-10-CM

## 2022-11-02 DIAGNOSIS — R05.9 COUGH, UNSPECIFIED TYPE: ICD-10-CM

## 2022-11-02 DIAGNOSIS — R05.1 ACUTE COUGH: ICD-10-CM

## 2022-11-02 LAB
SARS-COV-2 AG UPPER RESP QL IA: NEGATIVE
VALID CONTROL: NORMAL

## 2022-11-02 RX ORDER — AZITHROMYCIN 250 MG/1
TABLET, FILM COATED ORAL
Qty: 6 TABLET | Refills: 0 | Status: SHIPPED | OUTPATIENT
Start: 2022-11-02 | End: 2022-11-06

## 2022-11-02 RX ORDER — PREDNISONE 10 MG/1
TABLET ORAL
Qty: 26 TABLET | Refills: 0 | Status: SHIPPED | OUTPATIENT
Start: 2022-11-02

## 2022-11-02 RX ORDER — ALBUTEROL SULFATE 90 UG/1
2 AEROSOL, METERED RESPIRATORY (INHALATION) EVERY 4 HOURS PRN
Qty: 18 G | Refills: 0 | Status: SHIPPED | OUTPATIENT
Start: 2022-11-02

## 2022-11-02 NOTE — PROGRESS NOTES
[facility]    NAME: Aure Loomis is a 50 y o  male  : 1970    MRN: [id number]  DATE: 2022  TIME: 7:51 PM    Assessment and Plan   Acute bronchitis, unspecified organism [J20 9]  1  Acute bronchitis, unspecified organism  azithromycin (ZITHROMAX) 250 mg tablet    predniSONE 10 mg tablet    albuterol (Ventolin HFA) 90 mcg/act inhaler   2  Acute cough  Poct Covid 19 Rapid Antigen Test    XR chest pa & lateral    Covid/Flu-Office Collect       Patient Instructions     Patient Instructions   I have prescribed an antibiotic for the infection  Please take the antibiotic as prescribed and finish the entire prescription  I recommend that the patient takes an over the counter probiotic or eats yogurt with live cultures in it [place]) to keep good bacteria in the gut and help prevent diarrhea  Wash hands frequently to prevent the spread of infection  Can use over the counter cough and cold medications to help with symptoms  Ibuprofen and/or tylenol as needed for pain or fever  If not improving over the next 7-10 days, follow up with PCP  Prednisone as directed  Albuterol as needed  Chief Complaint     Chief Complaint   Patient presents with   • Fever   • Earache     Cough started yesterday        History of Present Illness   50year old male who presents with dry cough x 4 days  Cough worsened yesterday and became associated with nasal congestion, headache, and clogged ears  He says that he feels his headache at his temples and it wraps across the forehead, he describes it as "pressure"  Monday night he had an episode of diarrhea, he took Pepto-Bismol and has not had any more episodes of diarrhea  He did felt nauseous yesterday and threw up twice, he describes the contents as "mucousy"  Yesterday he had a temp of 103  2  He took tylenol which helped the fever and headache  He has not checked his temp today but feels "clamy"   He has also notices that he gets SOB and can hear wheezing when he lays down, especially when he lays on his right side  Review of Systems   Review of Systems   Constitutional: Positive for activity change, appetite change, chills, fatigue and fever  HENT: Positive for congestion and ear pain  Negative for postnasal drip, sinus pressure and sore throat  Respiratory: Positive for cough, shortness of breath and wheezing  Negative for chest tightness  Cardiovascular: Negative for chest pain  Gastrointestinal: Positive for diarrhea, nausea and vomiting  Negative for abdominal pain  Musculoskeletal: Positive for myalgias  Neurological: Positive for headaches  Negative for dizziness and light-headedness  All other systems reviewed and are negative        Current Medications     Current Outpatient Medications:   •  albuterol (Ventolin HFA) 90 mcg/act inhaler, Inhale 2 puffs every 4 (four) hours as needed for wheezing or shortness of breath (cough), Disp: 18 g, Rfl: 0  •  azithromycin (ZITHROMAX) 250 mg tablet, Take 2 tablets today then 1 tablet daily x 4 days, Disp: 6 tablet, Rfl: 0  •  predniSONE 10 mg tablet, Take 3 tabs BID X 2 days, 2 tabs BID X 2 days, 1 tab BID X 2 days, 1 tab daily X 2 days, Disp: 26 tablet, Rfl: 0  •  albuterol (PROVENTIL HFA,VENTOLIN HFA) 90 mcg/act inhaler, inhale 2 puffs by mouth and INTO THE LUNGS every 4 hours if needed for cough shortness of breath or wheezing, Disp: 8 5 g, Rfl: 1  •  Aspirin Low Dose 81 MG EC tablet, take 1 tablet by mouth once daily, Disp: 90 tablet, Rfl: 3  •  atorvastatin (LIPITOR) 40 mg tablet, Take 1 tablet (40 mg total) by mouth daily with dinner, Disp: 90 tablet, Rfl: 3  •  budesonide (Pulmicort Flexhaler) 90 MCG/ACT inhaler, Inhale 2 puffs 2 (two) times a day Rinse mouth after use , Disp: 1 each, Rfl: 1  •  chlorhexidine (PERIDEX) 0 12 % solution, Apply 15 mL to the mouth or throat 2 (two) times a day (Patient not taking: No sig reported), Disp: 120 mL, Rfl: 0  •  clopidogrel (PLAVIX) 75 mg tablet, take 1 tablet by mouth once daily, Disp: 90 tablet, Rfl: 3  •  lisinopril (ZESTRIL) 5 mg tablet, take 1 tablet by mouth once daily, Disp: 90 tablet, Rfl: 3  •  metoprolol succinate (TOPROL-XL) 50 mg 24 hr tablet, Take 1 tablet (50 mg total) by mouth daily, Disp: 90 tablet, Rfl: 3  •  nitroglycerin (NITROSTAT) 0 4 mg SL tablet, Place 1 tablet (0 4 mg total) under the tongue every 5 (five) minutes as needed for chest pain (Patient not taking: No sig reported), Disp: 30 tablet, Rfl: 3  •  Respiratory Therapy Supplies (Spirometer) KIT, Use 4 (four) times a day (Patient not taking: No sig reported), Disp: 1 kit, Rfl: 0  •  sildenafil (VIAGRA) 25 MG tablet, Take 1 tablet (25 mg total) by mouth daily as needed for erectile dysfunction (Patient not taking: No sig reported), Disp: 10 tablet, Rfl: 3    Current Allergies     Allergies as of 11/02/2022 - Reviewed 11/02/2022   Allergen Reaction Noted   • Bupropion Itching 01/21/2019          The following portions of the patient's history were reviewed and updated as appropriate: allergies, current medications, past family history, past medical history, past social history, past surgical history and problem list    Past Medical History:   Diagnosis Date   • Heart attack ([facility] [address] ) 06/28/2021     Past Surgical History:   Procedure Laterality Date   • CORONARY ANGIOPLASTY     • FINGER FRACTURE SURGERY     • MOUTH SURGERY       Family History   Problem Relation Age of Onset   • Hypertension Mother    • Breast cancer Mother    • Heart attack Father    • Hyperlipidemia Father    • Hyperlipidemia Sister    • Heart disease Maternal Grandmother    • Heart disease Maternal Grandfather    • Heart attack Maternal Grandfather      Social History     Socioeconomic History   • Marital status: Single     Spouse name: Not on file   • Number of children: Not on file   • Years of education: Not on file   • Highest education level: Not on file   Occupational History   • Not on file   Tobacco Use   • Smoking status: Former Smoker     Packs/day: 2 00     Years: 22 00     Pack years: 44 00     Quit date: 2021     Years since quittin 3   • Smokeless tobacco: Never Used   Vaping Use   • Vaping Use: Every day   • Substances: Nicotine   Substance and Sexual Activity   • Alcohol use: Not Currently   • Drug use: Not Currently     Types: Marijuana     Comment: 2016 last used   • Sexual activity: Not on file   Other Topics Concern   • Not on file   Social History Narrative   • Not on file     Social Determinants of Health     Financial Resource Strain: Not on file   Food Insecurity: Not on file   Transportation Needs: Not on file   Physical Activity: Not on file   Stress: Not on file   Social Connections: Not on file   Intimate Partner Violence: Not on file   Housing Stability: Not on file     Medications have been verified  Objective   Pulse 95   Temp 97 5 °F (36 4 °C)   Resp 18   Wt 132 kg (290 lb)   SpO2 99%   BMI 41 02 kg/m²      Physical Exam   Physical Exam  Constitutional:       General: He is not in acute distress  Appearance: Normal appearance  He is not toxic-appearing  HENT:      Head: Normocephalic and atraumatic  Right Ear: A middle ear effusion is present  Tympanic membrane is not erythematous  Left Ear: A middle ear effusion is present  Tympanic membrane is not erythematous  Nose: Congestion present  No rhinorrhea  Right Sinus: No maxillary sinus tenderness or frontal sinus tenderness  Left Sinus: No maxillary sinus tenderness or frontal sinus tenderness  Mouth/Throat:      Mouth: Mucous membranes are moist       Pharynx: No posterior oropharyngeal erythema  Cardiovascular:      Rate and Rhythm: Normal rate and regular rhythm  Heart sounds: No murmur heard  No friction rub  No gallop  Pulmonary:      Effort: Pulmonary effort is normal       Breath sounds: No stridor  Examination of the right-upper field reveals wheezing   Wheezing present  No rhonchi or rales  Abdominal:      General: Abdomen is flat  Bowel sounds are normal  There is no distension  Palpations: Abdomen is soft  Tenderness: There is no abdominal tenderness  There is no guarding  Neurological:      Mental Status: He is alert

## 2022-11-02 NOTE — PATIENT INSTRUCTIONS
I have prescribed an antibiotic for the infection  Please take the antibiotic as prescribed and finish the entire prescription  I recommend that the patient takes an over the counter probiotic or eats yogurt with live cultures in it [place]) to keep good bacteria in the gut and help prevent diarrhea  Wash hands frequently to prevent the spread of infection  Can use over the counter cough and cold medications to help with symptoms  Ibuprofen and/or tylenol as needed for pain or fever  If not improving over the next 7-10 days, follow up with PCP  Prednisone as directed  Albuterol as needed

## 2022-11-02 NOTE — LETTER
November 2, 2022     Patient: Jose L Redd   YOB: 1970   Date of Visit: 11/2/2022       To Whom It May Concern: It is my medical opinion that Jose L Redd may return to work on 11/4/22  If you have any questions or concerns, please don't hesitate to call           Sincerely,        Albino Moore PA-C    CC: No Recipients Prophylactic measure

## 2022-11-03 LAB
FLUAV RNA RESP QL NAA+PROBE: NEGATIVE
FLUBV RNA RESP QL NAA+PROBE: NEGATIVE
SARS-COV-2 RNA RESP QL NAA+PROBE: NEGATIVE

## 2022-12-26 ENCOUNTER — OFFICE VISIT (OUTPATIENT)
Dept: URGENT CARE | Facility: CLINIC | Age: 52
End: 2022-12-26

## 2022-12-26 VITALS — OXYGEN SATURATION: 99 % | RESPIRATION RATE: 18 BRPM | HEART RATE: 87 BPM | TEMPERATURE: 97.6 F

## 2022-12-26 DIAGNOSIS — J06.9 ACUTE RESPIRATORY DISEASE: Primary | ICD-10-CM

## 2022-12-26 RX ORDER — BENZONATATE 200 MG/1
200 CAPSULE ORAL 3 TIMES DAILY PRN
Qty: 20 CAPSULE | Refills: 0 | Status: SHIPPED | OUTPATIENT
Start: 2022-12-26

## 2022-12-26 NOTE — PROGRESS NOTES
[facility]        NAME: Nadege Berman is a 50 y o  male  : 1970    MRN: [id number]  DATE: 2022  TIME: 2:43 PM    Assessment and Plan   Acute respiratory disease [J06 9]  1  Acute respiratory disease  benzonatate (TESSALON) 200 MG capsule          Declined COVID test      Patient Instructions     Tessalon as prescribed  Vitamin D3 2000 IU daily  Vitamin C 1000mg twice per day  Multivitamin daily  Fluids and rest  Over the counter cold medication as needed  Follow up with PCP in 3-5 days  Proceed to  ER if symptoms worsen  Chief Complaint     Chief Complaint   Patient presents with   • Cough     Cough, congestion and fever since Friday  History of Present Illness       URI   This is a new problem  Episode onset: 3 days  The maximum temperature recorded prior to his arrival was 102 - 102 9 F  Associated symptoms include congestion, coughing, headaches and rhinorrhea  Pertinent negatives include no abdominal pain, chest pain, diarrhea, ear pain, nausea, rash, sinus pain, sneezing, sore throat, vomiting or wheezing  Treatments tried: vicks cough  Review of Systems   Review of Systems   Constitutional: Positive for fatigue and fever (Tmax 102 1)  Negative for chills  HENT: Positive for congestion and rhinorrhea  Negative for dental problem, ear discharge, ear pain, facial swelling, postnasal drip, sinus pressure, sinus pain, sneezing, sore throat and trouble swallowing  Eyes: Negative for itching  Respiratory: Positive for cough  Negative for chest tightness, shortness of breath and wheezing  Cardiovascular: Negative for chest pain and palpitations  Gastrointestinal: Negative for abdominal pain, constipation, diarrhea, nausea and vomiting  Musculoskeletal: Negative for myalgias  Skin: Negative for rash  Neurological: Positive for headaches  Negative for dizziness, weakness and light-headedness           Current Medications       Current Outpatient Medications:   •  benzonatate (TESSALON) 200 MG capsule, Take 1 capsule (200 mg total) by mouth 3 (three) times a day as needed for cough, Disp: 20 capsule, Rfl: 0  •  albuterol (PROVENTIL HFA,VENTOLIN HFA) 90 mcg/act inhaler, inhale 2 puffs by mouth and INTO THE LUNGS every 4 hours if needed for cough shortness of breath or wheezing, Disp: 8 5 g, Rfl: 1  •  albuterol (Ventolin HFA) 90 mcg/act inhaler, Inhale 2 puffs every 4 (four) hours as needed for wheezing or shortness of breath (cough), Disp: 18 g, Rfl: 0  •  Aspirin Low Dose 81 MG EC tablet, take 1 tablet by mouth once daily, Disp: 90 tablet, Rfl: 3  •  atorvastatin (LIPITOR) 40 mg tablet, Take 1 tablet (40 mg total) by mouth daily with dinner, Disp: 90 tablet, Rfl: 3  •  budesonide (Pulmicort Flexhaler) 90 MCG/ACT inhaler, Inhale 2 puffs 2 (two) times a day Rinse mouth after use , Disp: 1 each, Rfl: 1  •  chlorhexidine (PERIDEX) 0 12 % solution, Apply 15 mL to the mouth or throat 2 (two) times a day (Patient not taking: No sig reported), Disp: 120 mL, Rfl: 0  •  clopidogrel (PLAVIX) 75 mg tablet, take 1 tablet by mouth once daily, Disp: 90 tablet, Rfl: 3  •  lisinopril (ZESTRIL) 5 mg tablet, take 1 tablet by mouth once daily, Disp: 90 tablet, Rfl: 3  •  metoprolol succinate (TOPROL-XL) 50 mg 24 hr tablet, Take 1 tablet (50 mg total) by mouth daily, Disp: 90 tablet, Rfl: 3  •  nitroglycerin (NITROSTAT) 0 4 mg SL tablet, Place 1 tablet (0 4 mg total) under the tongue every 5 (five) minutes as needed for chest pain (Patient not taking: No sig reported), Disp: 30 tablet, Rfl: 3  •  predniSONE 10 mg tablet, Take 3 tabs BID X 2 days, 2 tabs BID X 2 days, 1 tab BID X 2 days, 1 tab daily X 2 days, Disp: 26 tablet, Rfl: 0  •  Respiratory Therapy Supplies (Spirometer) KIT, Use 4 (four) times a day (Patient not taking: No sig reported), Disp: 1 kit, Rfl: 0  •  sildenafil (VIAGRA) 25 MG tablet, Take 1 tablet (25 mg total) by mouth daily as needed for erectile dysfunction (Patient not taking: No sig reported), Disp: 10 tablet, Rfl: 3    Current Allergies     Allergies as of 12/26/2022 - Reviewed 12/26/2022   Allergen Reaction Noted   • Bupropion Itching 01/21/2019            The following portions of the patient's history were reviewed and updated as appropriate: allergies, current medications, past family history, past medical history, past social history, past surgical history and problem list      Past Medical History:   Diagnosis Date   • Heart attack ([facility] [address] ) 06/28/2021       Past Surgical History:   Procedure Laterality Date   • CORONARY ANGIOPLASTY     • FINGER FRACTURE SURGERY     • MOUTH SURGERY         Family History   Problem Relation Age of Onset   • Hypertension Mother    • Breast cancer Mother    • Heart attack Father    • Hyperlipidemia Father    • Hyperlipidemia Sister    • Heart disease Maternal Grandmother    • Heart disease Maternal Grandfather    • Heart attack Maternal Grandfather          Medications have been verified  Objective   Pulse 87   Temp 97 6 °F (36 4 °C)   Resp 18   SpO2 99%   No LMP for male patient  Physical Exam     Physical Exam  Vitals reviewed  Constitutional:       General: He is not in acute distress  Appearance: He is well-developed  He is not diaphoretic  HENT:      Head: Normocephalic  Right Ear: Tympanic membrane and external ear normal       Left Ear: Tympanic membrane and external ear normal       Nose: Nose normal       Mouth/Throat:      Pharynx: No oropharyngeal exudate or posterior oropharyngeal erythema  Eyes:      Conjunctiva/sclera: Conjunctivae normal    Cardiovascular:      Rate and Rhythm: Normal rate and regular rhythm  Heart sounds: Normal heart sounds  No murmur heard  No friction rub  No gallop  Pulmonary:      Effort: Pulmonary effort is normal  No respiratory distress  Breath sounds: Normal breath sounds  No wheezing, rhonchi or rales     Lymphadenopathy:      Cervical: No cervical adenopathy  Skin:     General: Skin is warm  Neurological:      Mental Status: He is alert and oriented to person, place, and time  Psychiatric:         Behavior: Behavior normal          Thought Content:  Thought content normal          Judgment: Judgment normal

## 2022-12-26 NOTE — PATIENT INSTRUCTIONS
Tessalon as prescribed  Vitamin D3 2000 IU daily  Vitamin C 1000mg twice per day  Multivitamin daily  Fluids and rest  Over the counter cold medication as needed  Follow up with PCP in 3-5 days  Proceed to  ER if symptoms worsen

## 2022-12-26 NOTE — LETTER
December 26, 2022     Patient: Cloyce Fleischer   YOB: 1970   Date of Visit: 12/26/2022       To Whom It May Concern: It is my medical opinion that Cloyce Fleischer may return provided symptoms have improved and has remained fever free for 24 hours without fever reducing medications  If you have any questions or concerns, please don't hesitate to call           Sincerely,        Oscar Mendosa PA-C    CC: No Recipients

## 2023-01-21 NOTE — UTILIZATION REVIEW
Assumed pt care at [address] [place] [address]. A&O x4. POD #3, CABG x3. Tele rhythm NSR. SPO2 100% on room air. Breath sounds clear bilaterally. Pt voiding with no issues. Pt c/o chest pain 8/10, relieved with 1 tab norco. No c/o shortness of breath. Sternal incision clean, dry, intact. LLE donor sites clean, dry and intact. Bed is locked and in low position. Call light and personal items within reach. Pt ambulated in hallway without issue, stairs with PT done 1/20. Reviewed plan of care and patient verbalizes understanding.      Problem: Patient/Family Goals  Goal: Patient/Family Long Term Goal  Description: Patient's Long Term Goal: to go home    Interventions:  - monitor labs, telemetry, smoking cessation, relief of chest pressure    - See additional Care Plan goals for specific interventions  Outcome: Progressing  Goal: Patient/Family Short Term Goal  Description: Patient's Short Term Goal: to have a bowel movement    Interventions:   - Ambulate QID  - Stool softener/laxative PRN  - Monitor bowel sounds  - See additional Care Plan goals for specific interventions  Outcome: Progressing     Problem: PAIN - ADULT  Goal: Verbalizes/displays adequate comfort level or patient's stated pain goal  Description: INTERVENTIONS:  - Encourage pt to monitor pain and request assistance  - Assess pain using appropriate pain scale  - Administer analgesics based on type and severity of pain and evaluate response  - Implement non-pharmacological measures as appropriate and evaluate response  - Consider cultural and social influences on pain and pain management  - Manage/alleviate anxiety  - Utilize distraction and/or relaxation techniques  - Monitor for opioid side effects  - Notify MD/LIP if interventions unsuccessful or patient reports new pain  - Anticipate increased pain with activity and pre-medicate as appropriate  Outcome: Progressing     Problem: CARDIOVASCULAR - ADULT  Goal: Maintains optimal cardiac output and hemodynamic Initial Clinical Review    Admission: Date/Time/Statement:   Admission Orders (From admission, onward)     Ordered        06/30/21 0050  Inpatient Admission  Once                   Orders Placed This Encounter   Procedures    Inpatient Admission     Standing Status:   Standing     Number of Occurrences:   1     Order Specific Question:   Level of Care     Answer:   Med Surg [16]     Order Specific Question:   Estimated length of stay     Answer:   More than 2 Midnights     Order Specific Question:   Certification     Answer:   I certify that inpatient services are medically necessary for this patient for a duration of greater than two midnights  See H&P and MD Progress Notes for additional information about the patient's course of treatment  Initial Presentation: 52year old male, presented to the ED @ [address], Transferred to [facility], higher level of care via EMS  Admitted as Inpatient due to STEMI,  Chest pain with anterior ST elevation  No known cardiac history  Active E-Cig use  Date: 06/30/2021   Initially presented with intermittent chest pain radiating to his throat and jaw  Also associated pain in both arms but worse in left  Initial trop 0 13, trend trops  Initial ECG reported via ED documentation for ST-elevations in V3, V4 with reciprocal ST-depressions in leads I, aVL but unable to locate this ECG  In cath lab showing MARIA LUISA inferior leads  ECG en route via EMS appears normal  LHC via radial approach showed lesion at the ostium of LAD now s/p ALVARADO  Cardiac cath showed a critical mid LAD lesion at D1  Successful PCI was performed with placement of a 3 0 x 23 mm ALVARADO  D1 was jailed by the stent  K 3 7 - PO KCl given  Start ASA, Brilinta, Statin and Metop tartrate  TTE, A1C, lipid panel  Isolyte at 125mL/hr for 1L total  Then can stop IVF  ECG on arrival to floor and in the morning to evaluate evolution of ST-segment changes  Monitor on telemetry      VTE ppx: SCDs, Lovenox 40mg daily in the morning    Day 2: 07/01/2021  Type 1 anterior STEMI  - initial troponin 0 13  - 6/30 LHC:  90% mid LAD culprit lesion t/w 3 0 x 23 mm Xience ALVARADO, jailing D1 branch with ELOISE 3 flow; residual mild-mod nonobstructive mid LCX and prox RCA disease  - 6/30 TTE:  LVEF 60%, no WMA, sigmoid septum, normal RV, no significant valvulopathy  - lipids:  T 168, , HDL 30, calculated LDL 71; A1c 5 4%  - aspirin,clopidogrel, lisinopril, metoprolol tartrate 25 mg b i d      Triage Vitals   Temperature Pulse Respirations Blood Pressure SpO2   06/30/21 0230 06/30/21 0204 06/30/21 0204 06/30/21 0204 06/30/21 0230   98 °F (36 7 °C) 82 22 114/77 97 %      Temp Source Heart Rate Source Patient Position - Orthostatic VS BP Location FiO2 (%)   06/30/21 0230 06/30/21 0400 06/30/21 0400 06/30/21 0400 --   Oral Monitor Lying Left arm       Pain Score       06/30/21 0230       No Pain          Wt Readings from Last 1 Encounters:   06/30/21 (!) 136 kg (300 lb 0 7 oz)     Additional Vital Signs:   Date/Time  Temp  Pulse  Resp  BP  MAP (mmHg)  SpO2  Calculated FIO2 (%) - Nasal Cannula  Nasal Cannula O2 Flow Rate (L/min)  O2 Device  Patient Position - Orthostatic VS   07/01/21 0800  --  --  --  --  --  --  --  --  None (Room air)  --   07/01/21 0730  97 8 °F (36 6 °C)  67  20  146/90  --  --  --  --  None (Room air)  Lying   07/01/21 02:42:19  98 2 °F (36 8 °C)  --  --  129/73  92  --  --  --  --  --   07/01/21 00:13:52  98 °F (36 7 °C)  --  --  124/70  88  --  --  --  --  --   06/30/21 2145  --  81  --  121/66  --  --  --  --  --  --   06/30/21 1800  --  78  22  119/59  80  95 %  --  --  --  --   06/30/21 1700  --  82  28Abnormal   139/68  86  95 %  --  --  --  --   06/30/21 1600  --  70  20  126/73  88  94 %  --  --  None (Room air)  Lying   06/30/21 1532  98 °F (36 7 °C)  --  --  --  --  --  --  --  --  --   06/30/21 1500  --  74  25Abnormal   122/68  96  95 %  --  --  --  --   06/30/21 1400  --  76  26Abnormal   116/64  85  96 stability  Description: INTERVENTIONS:  - Monitor vital signs, rhythm, and trends  - Monitor for bleeding, hypotension and signs of decreased cardiac output  - Evaluate effectiveness of vasoactive medications to optimize hemodynamic stability  - Monitor arterial and/or venous puncture sites for bleeding and/or hematoma  - Assess quality of pulses, skin color and temperature  - Assess for signs of decreased coronary artery perfusion - ex.  Angina  - Evaluate fluid balance, assess for edema, trend weights  Outcome: Progressing  Goal: Absence of cardiac arrhythmias or at baseline  Description: INTERVENTIONS:  - Continuous cardiac monitoring, monitor vital signs, obtain 12 lead EKG if indicated  - Evaluate effectiveness of antiarrhythmic and heart rate control medications as ordered  - Initiate emergency measures for life threatening arrhythmias  - Monitor electrolytes and administer replacement therapy as ordered  Outcome: Progressing %  --  --  --  --   21 1300  --  78  21  117/64  80  93 %  --  --  None (Room air)  Lying   21 1200  --  76  20  112/63  80  95 %  --  --  None (Room air)  Lying   21 1100  97 8 °F (36 6 °C)  80  20  147/80  98  94 %  --  --  None (Room air)  Lying   21 1000  --  74  22  127/66  87  94 %  --  --  --  --   21 0923  --  84  --  117/70  --  --  --  --  --  --   21 0900  --  82  22  117/70  81  94 %  --  --  --  --   21 0830  --  70  20  115/56  80  93 %  --  --  --  --   21 0800  98 °F (36 7 °C)  78  22  125/76  98  95 %  --  --  None (Room air)  Lying   21 0704  98 2 °F (36 8 °C)  --  --  --  --  --  --  --  --  --   21 0700  --  72  22  122/80  100  95 %  --  --  --  --   21 0600  --  74  19  116/73  86  93 %  --  --  --  --   21 0500  98 °F (36 7 °C)  74  19  118/71  84  93 %  --  --  --  --   21 0400  --  82  19  133/77  95  92 %  --  --  None (Room air)  Lying   21 0330  --  82  30Abnormal   116/69  83  96 %  --  --  --  --   21 0300  --  80  23Abnormal   121/75  90  95 %  --  --  None (Room air)  --     Date and Time Eye Opening Best Verbal Response Best Motor Response [place] Coma Scale Score   21 0800 4 5 6 15   21 0800 4 5 6 15   21 0445 4 5 6 15   21 0230 4 5 6 15     2021 @ 0903  Chest X:  No acute cardiopulmonary disease       2021 @ 0254  EC, NSR    Pertinent Labs/Diagnostic Test Results:     Results from last 7 days   Lab Units 21  1051 21  0855 21  2305   WBC Thousand/uL 11 11* 7 20 8 52   HEMOGLOBIN g/dL 16 5 16 2 16 6   HEMATOCRIT % 49 0 47 2 48 6   PLATELETS Thousands/uL 175 177 185   NEUTROS ABS Thousands/µL  --   --  4 90     Results from last 7 days   Lab Units 21  1051 21  0603 21  2305   SODIUM mmol/L 135* 134* 140   POTASSIUM mmol/L 3 6 4 3 3 7   CHLORIDE mmol/L 105 105 103   CO2 mmol/L 26 22 28   ANION GAP mmol/L 4 7 9   BUN mg/dL 15 15 16   CREATININE mg/dL 0 80 0 95 1 13   EGFR ml/min/1 73sq m 106 94 76   CALCIUM mg/dL 8 9 8 7 8 4   MAGNESIUM mg/dL 2 3  --   --      Results from last 7 days   Lab Units 06/29/21  2305   AST U/L 24   ALT U/L 35   ALK PHOS U/L 89   TOTAL PROTEIN g/dL 7 3   ALBUMIN g/dL 3 9   TOTAL BILIRUBIN mg/dL 0 51     Results from last 7 days   Lab Units 07/01/21  1051 06/30/21  0603 06/29/21  2305   GLUCOSE RANDOM mg/dL 143* 158* 124     Results from last 7 days   Lab Units 06/29/21  2305   HEMOGLOBIN A1C % 5 4   EAG mg/dl 108     Results from last 7 days   Lab Units 07/01/21  1051 06/30/21  0442 06/29/21  2305   TROPONIN I ng/mL 2 66 1 42 0 13*     Results from last 7 days   Lab Units 06/29/21  2305   D-DIMER QUANTITATIVE ug/ml FEU 0 52*     Results from last 7 days   Lab Units 06/29/21  2305   PROTIME seconds 12 6   INR  0 96   PTT seconds 30     Results from last 7 days   Lab Units 06/29/21  2305   LIPASE u/L 176     No past medical history on file  Admitting Diagnosis: ST elevation myocardial infarction (STEMI), unspecified artery ([facility] [address] ) [I21 3]  Age/Sex: 52 y o  male  Admission Orders:  Scheduled Medications:  aspirin, 81 mg, Oral, Daily  atorvastatin, 40 mg, Oral, Daily With Dinner  clopidogrel, 75 mg, Oral, Daily  enoxaparin, 40 mg, Subcutaneous, Daily  lisinopril, 5 mg, Oral, Daily  metoprolol tartrate, 25 mg, Oral, Q12H [address]  [MAR Hold] nicotine, 21 mg, Transdermal, Daily  potassium chloride, 40 mEq, Oral, Daily      Continuous IV Infusions:     PRN Meds:  acetaminophen, 650 mg, Oral, Q8H PRN  polyethylene glycol, 17 g, Oral, Daily PRN      Telemetry  William SCDs  IP CONSULT TO CASE MANAGEMENT  IP CONSULT TO CARDIOLOGY    Network Utilization Review Department  ATTENTION: Please call with any questions or concerns to [phone] and carefully listen to the prompts so that you are directed to the right person   All voicemails are confidential   [place] Patient all requests for admission clinical reviews, approved or denied determinations and any other requests to dedicated fax number below belonging to the campus where the patient is receiving treatment   List of dedicated fax numbers for the Facilities:  [address] [place] [address] DENIALS (Administrative/Medical Necessity) [phone]   [address] (Maternity/NICU/Pediatrics) [phone]   [address] [place] [address] [address] [place] [address] [address] [place] [address] [address] [facility] [phone]

## 2023-03-14 ENCOUNTER — HOSPITAL ENCOUNTER (OUTPATIENT)
Facility: HOSPITAL | Age: 53
Setting detail: OBSERVATION
Discharge: HOME | End: 2023-03-15
Attending: EMERGENCY MEDICINE | Admitting: INTERNAL MEDICINE

## 2023-03-14 ENCOUNTER — APPOINTMENT (EMERGENCY)
Dept: CT IMAGING | Facility: HOSPITAL | Age: 53
End: 2023-03-14

## 2023-03-14 DIAGNOSIS — J06.9 URI (UPPER RESPIRATORY INFECTION): ICD-10-CM

## 2023-03-14 DIAGNOSIS — K57.92 DIVERTICULITIS: ICD-10-CM

## 2023-03-14 DIAGNOSIS — F41.8 DEPRESSION WITH ANXIETY: ICD-10-CM

## 2023-03-14 DIAGNOSIS — Z72.0 TOBACCO USE: Primary | ICD-10-CM

## 2023-03-14 DIAGNOSIS — R93.5 ABNORMAL CT OF THE ABDOMEN: ICD-10-CM

## 2023-03-14 DIAGNOSIS — D72.829 LEUKOCYTOSIS: ICD-10-CM

## 2023-03-14 LAB
2HR DELTA HS TROPONIN: <0 NG/L
4HR DELTA HS TROPONIN: 1 NG/L
ALBUMIN SERPL BCP-MCNC: 4.3 G/DL (ref 3.5–5)
ALP SERPL-CCNC: 78 U/L (ref 34–104)
ALT SERPL W P-5'-P-CCNC: 29 U/L (ref 7–52)
ANION GAP SERPL CALCULATED.3IONS-SCNC: 11 MMOL/L (ref 4–13)
APTT PPP: 29 SECONDS (ref 23–37)
AST SERPL W P-5'-P-CCNC: 21 U/L (ref 13–39)
BASOPHILS # BLD AUTO: 0.07 THOUSANDS/ÂΜL (ref 0–0.1)
BASOPHILS NFR BLD AUTO: 1 % (ref 0–1)
BILIRUB SERPL-MCNC: 0.67 MG/DL (ref 0.2–1)
BILIRUB UR QL STRIP: NEGATIVE
BUN SERPL-MCNC: 9 MG/DL (ref 5–25)
CALCIUM SERPL-MCNC: 9.3 MG/DL (ref 8.4–10.2)
CARDIAC TROPONIN I PNL SERPL HS: 2 NG/L
CARDIAC TROPONIN I PNL SERPL HS: 3 NG/L
CARDIAC TROPONIN I PNL SERPL HS: <2 NG/L
CHLORIDE SERPL-SCNC: 99 MMOL/L (ref 96–108)
CLARITY UR: CLEAR
CO2 SERPL-SCNC: 23 MMOL/L (ref 21–32)
COLOR UR: YELLOW
CREAT SERPL-MCNC: 0.86 MG/DL (ref 0.6–1.3)
D DIMER PPP FEU-MCNC: <0.27 UG/ML FEU
EOSINOPHIL # BLD AUTO: 0.28 THOUSAND/ÂΜL (ref 0–0.61)
EOSINOPHIL NFR BLD AUTO: 2 % (ref 0–6)
ERYTHROCYTE [DISTWIDTH] IN BLOOD BY AUTOMATED COUNT: 13.3 % (ref 11.6–15.1)
GFR SERPL CREATININE-BSD FRML MDRD: 101 ML/MIN/1.73SQ M
GLUCOSE SERPL-MCNC: 128 MG/DL (ref 65–140)
GLUCOSE UR STRIP-MCNC: NEGATIVE MG/DL
HCT VFR BLD AUTO: 46 % (ref 36.5–49.3)
HGB BLD-MCNC: 15.9 G/DL (ref 12–17)
HGB UR QL STRIP.AUTO: NEGATIVE
IMM GRANULOCYTES # BLD AUTO: 0.06 THOUSAND/UL (ref 0–0.2)
IMM GRANULOCYTES NFR BLD AUTO: 0 % (ref 0–2)
INR PPP: 0.96 (ref 0.84–1.19)
KETONES UR STRIP-MCNC: NEGATIVE MG/DL
LACTATE SERPL-SCNC: 0.9 MMOL/L (ref 0.5–2)
LACTATE SERPL-SCNC: 1.1 MMOL/L (ref 0.5–2)
LEUKOCYTE ESTERASE UR QL STRIP: NEGATIVE
LIPASE SERPL-CCNC: 24 U/L (ref 11–82)
LYMPHOCYTES # BLD AUTO: 2.64 THOUSANDS/ÂΜL (ref 0.6–4.47)
LYMPHOCYTES NFR BLD AUTO: 18 % (ref 14–44)
MCH RBC QN AUTO: 30.6 PG (ref 26.8–34.3)
MCHC RBC AUTO-ENTMCNC: 34.6 G/DL (ref 31.4–37.4)
MCV RBC AUTO: 89 FL (ref 82–98)
MONOCYTES # BLD AUTO: 0.74 THOUSAND/ÂΜL (ref 0.17–1.22)
MONOCYTES NFR BLD AUTO: 5 % (ref 4–12)
NEUTROPHILS # BLD AUTO: 10.8 THOUSANDS/ÂΜL (ref 1.85–7.62)
NEUTS SEG NFR BLD AUTO: 74 % (ref 43–75)
NITRITE UR QL STRIP: NEGATIVE
NRBC BLD AUTO-RTO: 0 /100 WBCS
PH UR STRIP.AUTO: 6.5 [PH]
PLATELET # BLD AUTO: 254 THOUSANDS/UL (ref 149–390)
PLATELET # BLD AUTO: 282 THOUSANDS/UL (ref 149–390)
PMV BLD AUTO: 8.9 FL (ref 8.9–12.7)
PMV BLD AUTO: 9.2 FL (ref 8.9–12.7)
POTASSIUM SERPL-SCNC: 3.9 MMOL/L (ref 3.5–5.3)
PROCALCITONIN SERPL-MCNC: 0.14 NG/ML
PROT SERPL-MCNC: 7.1 G/DL (ref 6.4–8.4)
PROT UR STRIP-MCNC: NEGATIVE MG/DL
PROTHROMBIN TIME: 13 SECONDS (ref 11.6–14.5)
RBC # BLD AUTO: 5.2 MILLION/UL (ref 3.88–5.62)
SODIUM SERPL-SCNC: 133 MMOL/L (ref 135–147)
SP GR UR STRIP.AUTO: <=1.005 (ref 1–1.03)
UROBILINOGEN UR QL STRIP.AUTO: 0.2 E.U./DL
WBC # BLD AUTO: 14.59 THOUSAND/UL (ref 4.31–10.16)

## 2023-03-14 RX ORDER — ATORVASTATIN CALCIUM 40 MG/1
40 TABLET, FILM COATED ORAL
Status: DISCONTINUED | OUTPATIENT
Start: 2023-03-15 | End: 2023-03-15 | Stop reason: HOSPADM

## 2023-03-14 RX ORDER — FLUTICASONE PROPIONATE 110 UG/1
2 AEROSOL, METERED RESPIRATORY (INHALATION)
Status: DISCONTINUED | OUTPATIENT
Start: 2023-03-14 | End: 2023-03-15 | Stop reason: HOSPADM

## 2023-03-14 RX ORDER — CLOPIDOGREL BISULFATE 75 MG/1
75 TABLET ORAL DAILY
Status: DISCONTINUED | OUTPATIENT
Start: 2023-03-15 | End: 2023-03-15 | Stop reason: HOSPADM

## 2023-03-14 RX ORDER — METRONIDAZOLE 500 MG/100ML
500 INJECTION, SOLUTION INTRAVENOUS EVERY 8 HOURS
Status: DISCONTINUED | OUTPATIENT
Start: 2023-03-14 | End: 2023-03-14

## 2023-03-14 RX ORDER — ACETAMINOPHEN 325 MG/1
650 TABLET ORAL EVERY 6 HOURS PRN
Status: DISCONTINUED | OUTPATIENT
Start: 2023-03-14 | End: 2023-03-15 | Stop reason: HOSPADM

## 2023-03-14 RX ORDER — ASPIRIN 81 MG/1
81 TABLET ORAL DAILY
Status: DISCONTINUED | OUTPATIENT
Start: 2023-03-15 | End: 2023-03-15 | Stop reason: HOSPADM

## 2023-03-14 RX ORDER — NICOTINE 21 MG/24HR
1 PATCH, TRANSDERMAL 24 HOURS TRANSDERMAL DAILY
Status: DISCONTINUED | OUTPATIENT
Start: 2023-03-15 | End: 2023-03-15 | Stop reason: HOSPADM

## 2023-03-14 RX ORDER — LISINOPRIL 5 MG/1
5 TABLET ORAL DAILY
Status: DISCONTINUED | OUTPATIENT
Start: 2023-03-15 | End: 2023-03-14

## 2023-03-14 RX ORDER — SODIUM CHLORIDE 9 MG/ML
100 INJECTION, SOLUTION INTRAVENOUS CONTINUOUS
Status: DISCONTINUED | OUTPATIENT
Start: 2023-03-14 | End: 2023-03-15 | Stop reason: HOSPADM

## 2023-03-14 RX ORDER — ALBUTEROL SULFATE 90 UG/1
2 AEROSOL, METERED RESPIRATORY (INHALATION) EVERY 4 HOURS PRN
Status: DISCONTINUED | OUTPATIENT
Start: 2023-03-14 | End: 2023-03-15 | Stop reason: HOSPADM

## 2023-03-14 RX ORDER — MORPHINE SULFATE 4 MG/ML
4 INJECTION, SOLUTION INTRAMUSCULAR; INTRAVENOUS ONCE
Status: COMPLETED | OUTPATIENT
Start: 2023-03-14 | End: 2023-03-14

## 2023-03-14 RX ORDER — ONDANSETRON 2 MG/ML
4 INJECTION INTRAMUSCULAR; INTRAVENOUS EVERY 6 HOURS PRN
Status: DISCONTINUED | OUTPATIENT
Start: 2023-03-14 | End: 2023-03-15 | Stop reason: HOSPADM

## 2023-03-14 RX ORDER — ENOXAPARIN SODIUM 100 MG/ML
40 INJECTION SUBCUTANEOUS DAILY
Status: DISCONTINUED | OUTPATIENT
Start: 2023-03-15 | End: 2023-03-15 | Stop reason: HOSPADM

## 2023-03-14 RX ADMIN — METRONIDAZOLE 500 MG: 500 INJECTION, SOLUTION INTRAVENOUS at 21:04

## 2023-03-14 RX ADMIN — MORPHINE SULFATE 4 MG: 4 INJECTION, SOLUTION INTRAMUSCULAR; INTRAVENOUS at 21:05

## 2023-03-14 RX ADMIN — Medication 3.38 G: at 22:40

## 2023-03-14 RX ADMIN — SODIUM CHLORIDE 1000 ML: 0.9 INJECTION, SOLUTION INTRAVENOUS at 20:48

## 2023-03-14 RX ADMIN — SODIUM CHLORIDE 100 ML/HR: 0.9 INJECTION, SOLUTION INTRAVENOUS at 22:38

## 2023-03-14 RX ADMIN — IOHEXOL 100 ML: 350 INJECTION, SOLUTION INTRAVENOUS at 19:25

## 2023-03-14 NOTE — ED PROVIDER NOTES
History  Chief Complaint   Patient presents with   • Abdominal Pain     Patient states he started with upper left abdominal pain today around 8am today  Current pain is 8/10 at this time  HPI      This is a very pleasant, nontoxic, 33-year-old gentleman presents the the emergency department via vehicle with a chief complaint of sided abdominal pain which started this morning upon waking up  No prior history of trauma, cough, congestion but does report that there is a pleuritic component to the pain confined to the left upper quadrant  Patient is a reliable competent historian  Patient denies any urinary complaints, no change Hammad, rash on his back, or blood in his urine  Medical history significant for obesity, hyperlipidemia, tobacco abuse, prior ST elevation, mid LAD post PCI in June 2021 on Plavix  No relevant past surgical history  Prior to Admission Medications   Prescriptions Last Dose Informant Patient Reported? Taking? Aspirin Low Dose 81 MG EC tablet   No No   Sig: take 1 tablet by mouth once daily   Respiratory Therapy Supplies (Spirometer) KIT   No No   Sig: Use 4 (four) times a day   Patient not taking: No sig reported   albuterol (PROVENTIL HFA,VENTOLIN HFA) 90 mcg/act inhaler   No No   Sig: inhale 2 puffs by mouth and INTO THE LUNGS every 4 hours if needed for cough shortness of breath or wheezing   albuterol (Ventolin HFA) 90 mcg/act inhaler   No No   Sig: Inhale 2 puffs every 4 (four) hours as needed for wheezing or shortness of breath (cough)   atorvastatin (LIPITOR) 40 mg tablet   No No   Sig: Take 1 tablet (40 mg total) by mouth daily with dinner   benzonatate (TESSALON) 200 MG capsule   No No   Sig: Take 1 capsule (200 mg total) by mouth 3 (three) times a day as needed for cough   budesonide (Pulmicort Flexhaler) 90 MCG/ACT inhaler   No No   Sig: Inhale 2 puffs 2 (two) times a day Rinse mouth after use     chlorhexidine (PERIDEX) 0 12 % solution   No No   Sig: Apply 15 mL to the mouth or throat 2 (two) times a day   Patient not taking: No sig reported   clopidogrel (PLAVIX) 75 mg tablet   No No   Sig: take 1 tablet by mouth once daily   lisinopril (ZESTRIL) 5 mg tablet   No No   Sig: take 1 tablet by mouth once daily   metoprolol succinate (TOPROL-XL) 50 mg 24 hr tablet   No No   Sig: Take 1 tablet (50 mg total) by mouth daily   nitroglycerin (NITROSTAT) 0 4 mg SL tablet   No No   Sig: Place 1 tablet (0 4 mg total) under the tongue every 5 (five) minutes as needed for chest pain   Patient not taking: No sig reported   predniSONE 10 mg tablet   No No   Sig: Take 3 tabs BID X 2 days, 2 tabs BID X 2 days, 1 tab BID X 2 days, 1 tab daily X 2 days   sildenafil (VIAGRA) 25 MG tablet   No No   Sig: Take 1 tablet (25 mg total) by mouth daily as needed for erectile dysfunction   Patient not taking: No sig reported      Facility-Administered Medications: None       Past Medical History:   Diagnosis Date   [facility]       Past Surgical History:   Procedure Laterality Date   • CORONARY ANGIOPLASTY     • FINGER FRACTURE SURGERY     • MOUTH SURGERY         Family History   Problem Relation Age of Onset   • Hypertension Mother    • Breast cancer Mother    • Heart attack Father    • Hyperlipidemia Father    • Hyperlipidemia Sister    • Heart disease Maternal Grandmother    • Heart disease Maternal Grandfather    • Heart attack Maternal Grandfather      I have reviewed and agree with the history as documented  E-Cigarette/Vaping   • E-Cigarette Use Current Every Day User    • Comments Trying to wean off    Down to 6mg      E-Cigarette/Vaping Substances   • Nicotine Yes    • THC No    • CBD No    • Flavoring No    • Other No    • Unknown No      Social History     Tobacco Use   • Smoking status: Every Day     Packs/day:  00     Years: 22      Pack years: 22      Types: Cigarettes     Last attempt to quit: 2021     Years since quittin 7   • Smokeless tobacco: Never Vaping Use   • Vaping Use: Every day   • Substances: Nicotine   Substance Use Topics   • Alcohol use: Not Currently   • Drug use: Not Currently     Types: Marijuana     Comment: 2016 last used       Review of Systems   Constitutional: Negative  HENT: Negative  Eyes: Negative  Respiratory: Negative  Negative for shortness of breath  Cardiovascular: Negative  Negative for chest pain  Gastrointestinal: Positive for abdominal pain  Negative for diarrhea, nausea and vomiting  Endocrine: Negative  Genitourinary: Negative  Musculoskeletal: Negative  Skin: Negative  Allergic/Immunologic: Negative  Neurological: Negative  Hematological: Negative  Psychiatric/Behavioral: Negative  Physical Exam  Physical Exam  Vitals and nursing note reviewed  Constitutional:       Appearance: He is well-developed  He is obese  HENT:      Head: Normocephalic and atraumatic  Mouth/Throat:      Mouth: Mucous membranes are moist    Eyes:      Extraocular Movements: Extraocular movements intact  Cardiovascular:      Rate and Rhythm: Normal rate and regular rhythm  Heart sounds: Normal heart sounds  Pulmonary:      Effort: Pulmonary effort is normal    Abdominal:      General: Abdomen is flat  Bowel sounds are normal       Palpations: Abdomen is soft  Tenderness: There is abdominal tenderness in the epigastric area and left upper quadrant  Skin:     General: Skin is warm  Capillary Refill: Capillary refill takes less than 2 seconds  Neurological:      General: No focal deficit present  Mental Status: He is alert and oriented to person, place, and time     Psychiatric:         Mood and Affect: Mood normal          Behavior: Behavior normal          Vital Signs  ED Triage Vitals   Temperature Pulse Respirations Blood Pressure SpO2   03/14/23 1809 03/14/23 1809 03/14/23 1809 03/14/23 1809 03/14/23 1809   97 7 °F (36 5 °C) (!) 111 16 126/59 98 %      Temp Source Heart Rate Source Patient Position - Orthostatic VS BP Location FiO2 (%)   03/14/23 1809 03/14/23 2000 -- -- --   Temporal Monitor         Pain Score       03/14/23 1809       8           Vitals:    03/14/23 1830 03/14/23 1900 03/14/23 2000 03/14/23 2100   BP: 97/53 104/57  113/70   Pulse: 95 97 89 87         Visual Acuity      ED Medications  Medications   sodium chloride 0 9 % bolus 1,000 mL (1,000 mL Intravenous New Bag 3/14/23 2048)   piperacillin-tazobactam (ZOSYN) IVPB 3 375 g (has no administration in time range)   metroNIDAZOLE (FLAGYL) IVPB (premix) 500 mg 100 mL (500 mg Intravenous New Bag 3/14/23 2104)   morphine injection 4 mg (4 mg Intravenous Given 3/14/23 2105)   iohexol (OMNIPAQUE) 350 MG/ML injection (SINGLE-DOSE) 100 mL (100 mL Intravenous Given 3/14/23 1925)       Diagnostic Studies  Results Reviewed     Procedure Component Value Units Date/Time    Procalcitonin [[id number]]  (Normal) Collected: 03/14/23 2047    Lab Status: Final result Specimen: Blood from Arm, Right Updated: 03/14/23 2126     Procalcitonin 0 14 ng/ml     Lactic acid [[id number]]  (Normal) Collected: 03/14/23 2047    Lab Status: Final result Specimen: Blood from Arm, Right Updated: 03/14/23 2117     LACTIC ACID 1 1 mmol/L     Narrative:      Result may be elevated if tourniquet was used during collection  HS Troponin I 2hr [[id number]]  (Normal) Collected: 03/14/23 2030    Lab Status: Final result Specimen: Blood from Arm, Right Updated: 03/14/23 2109     hs TnI 2hr <2 ng/L      Delta 2hr hsTnI <0 ng/L     Blood culture #1 [[id number]] Collected: 03/14/23 2053    Lab Status: In process Specimen: Blood from Arm, Left Updated: 03/14/23 2057    Blood culture #2 [[id number]] Collected: 03/14/23 2047    Lab Status:  In process Specimen: Blood from Arm, Right Updated: 03/14/23 2057    UA w Reflex to Microscopic w Reflex to Culture [[id number]] Collected: 03/14/23 2037    Lab Status: Final result Specimen: Urine, Clean Catch Updated: 03/14/23 2051     Color, UA Yellow     Clarity, UA Clear     Specific Gravity, UA <=1 005     pH, UA 6 5     Leukocytes, UA Negative     Nitrite, UA Negative     Protein, UA Negative mg/dl      Glucose, UA Negative mg/dl      Ketones, UA Negative mg/dl      Urobilinogen, UA 0 2 E U /dl      Bilirubin, UA Negative     Occult Blood, UA Negative    HS Troponin I 4hr [[id number]]     Lab Status: No result Specimen: Blood     Protime-INR [[id number]]  (Normal) Collected: 03/14/23 1826    Lab Status: Final result Specimen: Blood from Arm, Right Updated: 03/14/23 1859     Protime 13 0 seconds      INR 0 96    APTT [[id number]]  (Normal) Collected: 03/14/23 1826    Lab Status: Final result Specimen: Blood from Arm, Right Updated: 03/14/23 1859     PTT 29 seconds     HS Troponin 0hr (reflex protocol) [[id number]]  (Normal) Collected: 03/14/23 1826    Lab Status: Final result Specimen: Blood from Hand, Right Updated: 03/14/23 1858     hs TnI 0hr 2 ng/L     Comprehensive metabolic panel [[id number]]  (Abnormal) Collected: 03/14/23 1826    Lab Status: Final result Specimen: Blood from Hand, Right Updated: 03/14/23 1852     Sodium 133 mmol/L      Potassium 3 9 mmol/L      Chloride 99 mmol/L      CO2 23 mmol/L      ANION GAP 11 mmol/L      BUN 9 mg/dL      Creatinine 0 86 mg/dL      Glucose 128 mg/dL      Calcium 9 3 mg/dL      AST 21 U/L      ALT 29 U/L      Alkaline Phosphatase 78 U/L      Total Protein 7 1 g/dL      Albumin 4 3 g/dL      Total Bilirubin 0 67 mg/dL      eGFR 101 ml/min/1 73sq m     Narrative:      [facility] guidelines for Chronic Kid[facility] Disease (CKD):   •  Stage 1 with normal or high GFR (GFR > 90 mL/min/1 73 square meters)  •  Stage 2 Mild CKD (GFR = 60-89 mL/min/1 73 square meters)  •  Stage 3A Moderate CKD (GFR = 45-59 mL/min/1 73 square meters)  •  Stage 3B Moderate CKD (GFR = 30-44 mL/min/1 73 square meters)  •  Stage 4 Severe CKD (GFR = 15-29 mL/min/1 73 square meters)  •  Stage 5 End Stage CKD (GFR <15 mL/min/1 73 square meters)  Note: GFR calculation is accurate only with a steady state creatinine    Lipase [[id number]]  (Normal) Collected: 03/14/23 1826    Lab Status: Final result Specimen: Blood from Hand, Right Updated: 03/14/23 1852     Lipase 24 u/L     D-Dimer [[id number]]  (Normal) Collected: 03/14/23 1826    Lab Status: Final result Specimen: Blood from Arm, Right Updated: 03/14/23 1851     D-Dimer, Quant <0 27 ug/ml FEU     Narrative: In the evaluation for possible pulmonary embolism, in the appropriate (Well's Score of 4 or less) patient, the age adjusted d-dimer cutoff for this patient can be calculated as:    Age x 0 01 (in ug/mL) for Age-adjusted D-dimer exclusion threshold for a patient over 50 years  CBC and differential [[id number]]  (Abnormal) Collected: 03/14/23 1826    Lab Status: Final result Specimen: Blood from Hand, Right Updated: 03/14/23 1835     WBC 14 59 Thousand/uL      RBC 5 20 Million/uL      Hemoglobin 15 9 g/dL      Hematocrit 46 0 %      MCV 89 fL      MCH 30 6 pg      MCHC 34 6 g/dL      RDW 13 3 %      MPV 9 2 fL      Platelets 422 Thousands/uL      nRBC 0 /100 WBCs      Neutrophils Relative 74 %      Immat GRANS % 0 %      Lymphocytes Relative 18 %      Monocytes Relative 5 %      Eosinophils Relative 2 %      Basophils Relative 1 %      Neutrophils Absolute 10 80 Thousands/µL      Immature Grans Absolute 0 06 Thousand/uL      Lymphocytes Absolute 2 64 Thousands/µL      Monocytes Absolute 0 74 Thousand/µL      Eosinophils Absolute 0 28 Thousand/µL      Basophils Absolute 0 07 Thousands/µL                  CT abdomen pelvis with contrast   Final Result by Taylor Mon MD (03/14 2036)      Focal inflammation adjacent to the descending colon as described above  Differential diagnosis includes diverticulitis, or epiploic appendagitis  As mentioned above, the corresponding area of the colon shows focal decreased enhancement of uncertain    etiology    Recommend follow-up colonoscopy when the acute symptoms subside to exclude an underlying mass if one has not been done recently  The study was marked in [facility] for immediate notification  Workstation performed: [id number]                    Procedures  ECG 12 Lead Documentation Only    Date/Time: 3/14/2023 6:21 PM  Performed by: Dalila Castrejon DO  Authorized by: Sven Ventura III, DO     Indications / Diagnosis:  Luq PAIN  ECG reviewed by me, the ED Provider: yes    Patient location:  ED  Comments:      I personally reviewed this EKG that was performed and the patient March 14, 2023, EKG was completed at 6:21 PM inter by me at 6:25 PM, sinus tachycardia with a ventricular rate of 102 bpm, no acute ST abnormalities  No diffuse elevations to indicate pericarditis  No coved ST elevations greater than 2mm with negative T waves in V1-3 to indicate concern for brugada  No biphasic T waves in V2, V3 to indicate Wellens (critical stenosis of LAD)  No elevation in aVR or deviation when compared to V1 (can be associated with ST depression in I,II, V4-6 when left main occlusion is present)  ED Course  ED Course as of 03/14/23 2129   Tue Mar 14, 2023   1816 Patient seen and examined  Orders placed  10 hrs of L sided abdominal pain with nausea, + pleuritic component, no cough, pain confined to LUQ area, no surgical hx  Diff dx: L sided diverticulitis vs  L sided renal colic vs  Atypical PE presentation vs  Atypical cardiac  2038 CT results: Focal inflammation adjacent to the descending colon as described above  Differential diagnosis includes diverticulitis, or epiploic appendagitis  As mentioned above, the corresponding area of the colon shows focal decreased enhancement of uncertain   etiology  2049 [facility] contacted for admission: Alfonso Bassett, awaiting acceptance for admission  2109 Communication resent to [facility] provider for admission     2122 Case d/w: Dr Yemi Cisse, Latasha V, will accept pt for admission  HEART Risk Score    Flowsheet Row Most Recent Value   Heart Score Risk Calculator    History 0 Filed at: 03/14/2023 1858   ECG 0 Filed at: 03/14/2023 1858   Age 1 Filed at: 03/14/2023 1858   Risk Factors 1 Filed at: 03/14/2023 1858   Troponin 0 Filed at: 03/14/2023 1858   HEART Score 2 Filed at: 03/14/2023 1858                PERC Rule for PE    Flowsheet Row Most Recent Value   PERC Rule for PE    Age >=50 1 Filed at: 03/14/2023 1858   HR >=100 0 Filed at: 03/14/2023 1858   O2 Sat on room air < 95% 0 Filed at: 03/14/2023 1858   History of PE or DVT 0 Filed at: 03/14/2023 1858   Recent trauma or surgery 0 Filed at: 03/14/2023 1858   Hemoptysis 0 Filed at: 03/14/2023 1858   Exogenous estrogen 0 Filed at: 03/14/2023 1858   Unilateral leg swelling 0 Filed at: 03/14/2023 1858   PERC Rule for PE Results 1 Filed at: 03/14/2023 1858              SBIRT 22yo+    Flowsheet Row Most Recent Value   SBIRT (25 yo +)    In order to provide better care to our patients, we are screening all of our patients for alcohol and drug use  Would it be okay to ask you these screening questions?  No Filed at: 03/14/2023 1810          Wells' Criteria for PE    Flowsheet Row Most Recent Value   Wells' Criteria for PE    Clinical signs and symptoms of DVT 0 Filed at: 03/14/2023 1858   PE is primary diagnosis or equally likely 0 Filed at: 03/14/2023 1858   HR >100 0 Filed at: 03/14/2023 1858   Immobilization at least 3 days or Surgery in the previous 4 weeks 0 Filed at: 03/14/2023 1858   Previous, objectively diagnosed PE or DVT 0 Filed at: 03/14/2023 1858   Hemoptysis 0 Filed at: 03/14/2023 1858   Malignancy with treatment within 6 months or palliative 0 Filed at: 03/14/2023 1858   Wells' Criteria Total 0 Filed at: 03/14/2023 1856        Ayza' Criteria for DVT    Flowsheet Row Most Recent Value   Felicitas Criteria for DVT    Active cancer Treatment or palliation within 6 months 0 Filed at: 03/14/2023 1858   Bedridden recently >3 days or major surgery within 12 weeks 0 Filed at: 03/14/2023 1858   Calf swelling >3 cm compared to the other leg 0 Filed at: 03/14/2023 1858   Entire leg swollen 0 Filed at: 03/14/2023 1858   Collateral (nonvaricose) superficial veins present 0 Filed at: 03/14/2023 1858   Localized tenderness along the deep venous system 0 Filed at: 03/14/2023 1858   Pitting edema, confined to symptomatic leg 0 Filed at: 03/14/2023 1858   Paralysis, paresis, or recent plaster immobilization of the lower extremity 0 Filed at: 03/14/2023 1858   Previously documented DVT 0 Filed at: 03/14/2023 1858   Alternative diagnosis to DVT as likely or more likely 0 Filed at: 03/14/2023 1858   Wells DVT Critera Score 0 Filed at: 03/14/2023 1858              Medical Decision Making  53-year-old gentleman presents emergency department left-sided abdominal pain, no prior history of colonoscopy, virgin abdomen, showed the patient has left-sided focal stranding of the bowel consistent with possible diverticulitis with a mild leukocytosis, patient be admitted to the hospital for IV antibiotics, IV fluids serial exams  Stable for admission to the hospital service  Portions of the record may have been created with voice recognition software  Occasional wrong word or "sound a like" substitutions may have occurred due to the inherent limitations of voice recognition software  Read the chart carefully and recognize, using context, where substitutions have occurred  This patient was examined during the Covid-19 pandemic, and appropriate PPE was employed as defined by OSHA to minimize exposure to the patient and to avoid spread in the event that I am an asymptomatic carrier  All efforts were made to avoid direct contact with the patient per CDC guidelines ("social distancing") unless otherwise necessary to rule out a medical emergency and/or to provide life-saving interventions   Donning and doffing of PPE was performed per recommended guidelines, and personal PPE was employed if /when institutional PPE was not readily available or was deemed to be less than the recommended as defined by OSHA  Diverticulitis: acute illness or injury  Leukocytosis: acute illness or injury  Amount and/or Complexity of Data Reviewed  Labs: ordered  Radiology: ordered  Risk  Prescription drug management  Decision regarding hospitalization  Disposition  Final diagnoses:   Diverticulitis   Leukocytosis     Time reflects when diagnosis was documented in both MDM as applicable and the Disposition within this note     Time User Action Codes Description Comment    3/14/2023  9:23 PM Guadlupe Osler [K57 [id number]] Diverticulitis     3/14/2023  9:23 PM Pervis Boxer Add [[id number]] Leukocytosis       ED Disposition     ED Disposition   Admit    Condition   Stable    Date/Time   Tue Mar 14, 2023  9:26 PM    Comment   Case was discussed with Dr Ashleigh Cruz and the patient's admission status was agreed to be Admission Status: observation status to the service of Dr Felipe Parker   Follow-up Information    None         Patient's Medications   Discharge Prescriptions    No medications on file       No discharge procedures on file      PDMP Review     None          ED Provider  Electronically Signed by           Mariia Groves III, DO  03/14/23 7328

## 2023-03-14 NOTE — ED NOTES
Patient transported to [facility] [address] [place] [address],[address], RN  03/14/23 9150

## 2023-03-15 ENCOUNTER — TELEPHONE (OUTPATIENT)
Dept: GASTROENTEROLOGY | Facility: CLINIC | Age: 53
End: 2023-03-15

## 2023-03-15 VITALS
HEIGHT: 71 IN | SYSTOLIC BLOOD PRESSURE: 133 MMHG | BODY MASS INDEX: 40.83 KG/M2 | TEMPERATURE: 98.2 F | RESPIRATION RATE: 16 BRPM | WEIGHT: 291.67 LBS | DIASTOLIC BLOOD PRESSURE: 88 MMHG | HEART RATE: 82 BPM | OXYGEN SATURATION: 95 %

## 2023-03-15 PROBLEM — J06.9 URI (UPPER RESPIRATORY INFECTION): Status: ACTIVE | Noted: 2023-03-15

## 2023-03-15 PROBLEM — A41.9 SEPSIS (HCC): Status: ACTIVE | Noted: 2023-03-15

## 2023-03-15 PROBLEM — A41.9 SEPSIS (HCC): Status: RESOLVED | Noted: 2023-03-15 | Resolved: 2023-03-15

## 2023-03-15 PROBLEM — R93.5 ABNORMAL CT OF THE ABDOMEN: Status: ACTIVE | Noted: 2023-03-15

## 2023-03-15 LAB
ALBUMIN SERPL BCP-MCNC: 3.8 G/DL (ref 3.5–5)
ALP SERPL-CCNC: 66 U/L (ref 34–104)
ALT SERPL W P-5'-P-CCNC: 21 U/L (ref 7–52)
ANION GAP SERPL CALCULATED.3IONS-SCNC: 8 MMOL/L (ref 4–13)
AST SERPL W P-5'-P-CCNC: 15 U/L (ref 13–39)
ATRIAL RATE: 102 BPM
BASOPHILS # BLD AUTO: 0.05 THOUSANDS/ÂΜL (ref 0–0.1)
BASOPHILS NFR BLD AUTO: 1 % (ref 0–1)
BILIRUB SERPL-MCNC: 1.07 MG/DL (ref 0.2–1)
BUN SERPL-MCNC: 8 MG/DL (ref 5–25)
CALCIUM SERPL-MCNC: 8.5 MG/DL (ref 8.4–10.2)
CHLORIDE SERPL-SCNC: 103 MMOL/L (ref 96–108)
CO2 SERPL-SCNC: 26 MMOL/L (ref 21–32)
CREAT SERPL-MCNC: 0.83 MG/DL (ref 0.6–1.3)
EOSINOPHIL # BLD AUTO: 0.25 THOUSAND/ÂΜL (ref 0–0.61)
EOSINOPHIL NFR BLD AUTO: 3 % (ref 0–6)
ERYTHROCYTE [DISTWIDTH] IN BLOOD BY AUTOMATED COUNT: 13.2 % (ref 11.6–15.1)
FLUAV RNA RESP QL NAA+PROBE: NEGATIVE
FLUBV RNA RESP QL NAA+PROBE: NEGATIVE
GFR SERPL CREATININE-BSD FRML MDRD: 102 ML/MIN/1.73SQ M
GLUCOSE P FAST SERPL-MCNC: 110 MG/DL (ref 65–99)
GLUCOSE SERPL-MCNC: 110 MG/DL (ref 65–140)
HCT VFR BLD AUTO: 42.2 % (ref 36.5–49.3)
HGB BLD-MCNC: 14.5 G/DL (ref 12–17)
IMM GRANULOCYTES # BLD AUTO: 0.05 THOUSAND/UL (ref 0–0.2)
IMM GRANULOCYTES NFR BLD AUTO: 1 % (ref 0–2)
LYMPHOCYTES # BLD AUTO: 1.91 THOUSANDS/ÂΜL (ref 0.6–4.47)
LYMPHOCYTES NFR BLD AUTO: 24 % (ref 14–44)
MAGNESIUM SERPL-MCNC: 2 MG/DL (ref 1.9–2.7)
MCH RBC QN AUTO: 30.7 PG (ref 26.8–34.3)
MCHC RBC AUTO-ENTMCNC: 34.4 G/DL (ref 31.4–37.4)
MCV RBC AUTO: 89 FL (ref 82–98)
MONOCYTES # BLD AUTO: 0.52 THOUSAND/ÂΜL (ref 0.17–1.22)
MONOCYTES NFR BLD AUTO: 7 % (ref 4–12)
NEUTROPHILS # BLD AUTO: 5.17 THOUSANDS/ÂΜL (ref 1.85–7.62)
NEUTS SEG NFR BLD AUTO: 64 % (ref 43–75)
NRBC BLD AUTO-RTO: 0 /100 WBCS
P AXIS: 62 DEGREES
PHOSPHATE SERPL-MCNC: 3.4 MG/DL (ref 2.7–4.5)
PLATELET # BLD AUTO: 221 THOUSANDS/UL (ref 149–390)
PMV BLD AUTO: 9 FL (ref 8.9–12.7)
POTASSIUM SERPL-SCNC: 3.8 MMOL/L (ref 3.5–5.3)
PR INTERVAL: 136 MS
PROT SERPL-MCNC: 6.5 G/DL (ref 6.4–8.4)
QRS AXIS: 55 DEGREES
QRSD INTERVAL: 90 MS
QT INTERVAL: 330 MS
QTC INTERVAL: 430 MS
RBC # BLD AUTO: 4.72 MILLION/UL (ref 3.88–5.62)
RSV RNA RESP QL NAA+PROBE: NEGATIVE
SARS-COV-2 RNA RESP QL NAA+PROBE: NEGATIVE
SODIUM SERPL-SCNC: 137 MMOL/L (ref 135–147)
T WAVE AXIS: 56 DEGREES
VENTRICULAR RATE: 102 BPM
WBC # BLD AUTO: 7.95 THOUSAND/UL (ref 4.31–10.16)

## 2023-03-15 RX ORDER — AMOXICILLIN AND CLAVULANATE POTASSIUM 875; 125 MG/1; MG/1
1 TABLET, FILM COATED ORAL EVERY 12 HOURS SCHEDULED
Qty: 20 TABLET | Refills: 0 | Status: SHIPPED | OUTPATIENT
Start: 2023-03-15 | End: 2023-03-25

## 2023-03-15 RX ORDER — BENZONATATE 100 MG/1
100 CAPSULE ORAL 3 TIMES DAILY PRN
Qty: 20 CAPSULE | Refills: 0 | Status: SHIPPED | OUTPATIENT
Start: 2023-03-15

## 2023-03-15 RX ORDER — GUAIFENESIN 600 MG/1
600 TABLET, EXTENDED RELEASE ORAL EVERY 12 HOURS PRN
Qty: 14 TABLET | Refills: 0 | Status: SHIPPED | OUTPATIENT
Start: 2023-03-15 | End: 2023-03-22

## 2023-03-15 RX ORDER — GUAIFENESIN 600 MG/1
600 TABLET, EXTENDED RELEASE ORAL EVERY 12 HOURS SCHEDULED
Status: DISCONTINUED | OUTPATIENT
Start: 2023-03-15 | End: 2023-03-15 | Stop reason: HOSPADM

## 2023-03-15 RX ORDER — BENZONATATE 100 MG/1
100 CAPSULE ORAL 3 TIMES DAILY PRN
Status: DISCONTINUED | OUTPATIENT
Start: 2023-03-15 | End: 2023-03-15 | Stop reason: HOSPADM

## 2023-03-15 RX ADMIN — NICOTINE 1 PATCH: 21 PATCH, EXTENDED RELEASE TRANSDERMAL at 08:56

## 2023-03-15 RX ADMIN — ENOXAPARIN SODIUM 40 MG: 40 INJECTION SUBCUTANEOUS at 08:56

## 2023-03-15 RX ADMIN — PIPERACILLIN AND TAZOBACTAM 3.38 G: 3; .375 INJECTION, POWDER, FOR SOLUTION INTRAVENOUS at 06:15

## 2023-03-15 RX ADMIN — GUAIFENESIN 600 MG: 600 TABLET ORAL at 09:04

## 2023-03-15 RX ADMIN — FLUTICASONE PROPIONATE 2 PUFF: 110 AEROSOL, METERED RESPIRATORY (INHALATION) at 09:04

## 2023-03-15 RX ADMIN — CLOPIDOGREL BISULFATE 75 MG: 75 TABLET ORAL at 08:56

## 2023-03-15 RX ADMIN — ASPIRIN 81 MG: 81 TABLET, COATED ORAL at 08:56

## 2023-03-15 NOTE — CASE MANAGEMENT
Case Management Assessment & Discharge Planning Note    Patient name Jaya Quinn  Location [address] [id number] MRN [id number]  : 1970 Date 3/15/2023       Current Admission Date: 3/14/2023  Current Admission Diagnosis:Diverticulitis   Patient Active Problem List    Diagnosis Date Noted   • Diverticulitis 2023   • Morbid obesity with BMI of 40 0-44 9, adult ([email or web address] ) 2022   • Coronary artery disease involving native heart without angina pectoris 2021   • Tobacco use 2021   • Dyslipidemia 2021   • [facility]   • Acute ST elevation myocardial infarction (STEMI) due to occlusion of mid portion of left anterior descending (LAD) coronary artery ([email or web address] 75 ) 2021   • Depression with anxiety 2014   • Tobacco dependence syndrome 2013   • Traumatic brain injury 01/15/2013   • Hyperglycemia 01/15/2013   • Fatigue 01/15/2013   • Family history of acute myocardial infarction 01/15/2013      LOS (days): 0  Geometric Mean LOS (GMLOS) (days):   Days to GMLOS:     OBJECTIVE:              Current admission status: Observation       Preferred Pharmacy:   [address] [place] [address] GUNJAN Grant [address] [address]  Phone: [phone] [facility] [address] 210 [facility]  Phone: [phone] Fax: [phone]    Primary Care Provider: Kevin Mireles MD    Primary Insurance: [employer]  Secondary Insurance:     ASSESSMENT:  [address], [place] Representative - Son   Primary Phone: [phone] (Mobile)               Advance Directives  Does patient have a [address]?: No  Was patient offered paperwork?: Yes (declines)  Does patient currently have a Health Care decision maker?: Yes, please see Health Care Proxy section  Does patient have Advance Directives?: No  Was patient offered paperwork?: Yes (declines)  Primary Contact: Olena Kwon (Son)   [phone] (M)         Readmission Root Cause  30 Day Readmission: No    Patient Information  Admitted from[de-identified] Home  Mental Status: Alert  During Assessment patient was accompanied by: Not accompanied during assessment  Assessment information provided by[de-identified] Patient  Primary Caregiver: Self  Support Systems: Cherelle Kaplan  [place] of Residence: [address] do you live in?: [address] [facility] entry access options   Select all that apply : Stairs  Number of steps to enter home : 2  Type of Current Residence: 3 story home  Upon entering residence, is there a bedroom on the main floor (no further steps)?: No  A bedroom is located on the following floor levels of residence (select all that apply):: 2nd Floor  Upon entering residence, is there a bathroom on the main floor (no further steps)?: No  Indicate which floors of current residence have a bathroom (select all the apply):: 2nd Floor  Number of steps to 2nd floor from main floor: One Flight  In the last 12 months, was there a time when you were not able to pay the mortgage or rent on time?: No  In the last 12 months, how many places have you lived?: 1  In the last 12 months, was there a time when you did not have a steady place to sleep or slept in a shelter (including now)?: No  Homeless/housing insecurity resource given?: N/A  Living Arrangements: Lives w/ Son (with 2 sons:30yo and 6yo)  Is patient a ?: No    Activities of Daily Living Prior to Admission  Functional Status: Independent  Completes ADLs independently?: Yes  Ambulates independently?: Yes  Does patient use assisted devices?: No  Does patient currently own DME?: No  Does patient have a history of Outpatient Therapy (PT/OT)?: No  Does the patient have a history of Short-Term Rehab?: No  Does patient have a history of HHC?: No  [facility] have [facility] ?: No         Patient Information Continued  Income Source: Employed  Does patient have prescription coverage?: Yes  Within the past 12 months, you worried that your food would run out before you got the money to buy more : Never true  Within the past 12 months, the food you bought just didn't last and you didn't have money to get more : Never true  Food insecurity resource given?: N/A  Does patient receive dialysis treatments?: No  Does patient have a history of substance abuse?: Yes  Historical substance use preference: Marijuana  Is patient currently in treatment for substance abuse?: N/A - sober  Does patient have a history of Mental Health Diagnosis?: Yes (anxiety; depression)  Is patient receiving treatment for mental health?: No  Treatment options were provided  (pt requesting OP follow up with a counselor/therapist, referring pt to [facility])  Has patient received inpatient treatment related to mental health in the last 2 years?: No         Means of Transportation  Means of Transport to Appts[de-identified] Drives Self  In the past 12 months, has lack of transportation kept you from medical appointments or from getting medications?: No  In the past 12 months, has lack of transportation kept you from meetings, work, or from getting things needed for daily living?: No  Was application for public transport provided?: N/A        DISCHARGE DETAILS:    Discharge planning discussed with[de-identified] patient        CM contacted family/caregiver?: No- see comments (pt declines)  Were Treatment Team discharge recommendations reviewed with patient/caregiver?: Yes  Did patient/caregiver verbalize understanding of patient care needs?: Yes  Were patient/caregiver advised of the risks associated with not following Treatment Team discharge recommendations?: Yes         [address]         Is the patient interested in [place] [address] at discharge?: No    DME Referral Provided  Referral made for DME?: No         Would you like to participate in our [address] service program?  : No - Declined       Discharge Destination Plan[de-identified] Home  Transport at Discharge : Self      Plans at this time are home on dc with OP follow up  Pt interested in a counselor/therapist OP after dc  [address] put on AVS, pt will call and schedule)  Pt has his car here and will drive himself home on dc  CM will follow and assist in dc planning

## 2023-03-15 NOTE — PLAN OF CARE
Problem: MOBILITY - ADULT  Goal: Maintain or return to baseline ADL function  Description: INTERVENTIONS:  -  Assess patient's ability to carry out ADLs; assess patient's baseline for ADL function and identify physical deficits which impact ability to perform ADLs (bathing, care of mouth/teeth, toileting, grooming, dressing, etc )  - Assess/evaluate cause of self-care deficits   - Assess range of motion  - Assess patient's mobility; develop plan if impaired  - Assess patient's need for assistive devices and provide as appropriate  - Encourage maximum independence but intervene and supervise when necessary  - Involve family in performance of ADLs  - Assess for home care needs following discharge   - Consider OT consult to assist with ADL evaluation and planning for discharge  - Provide patient education as appropriate  Outcome: Progressing  Goal: Maintains/Returns to pre admission functional level  Description: INTERVENTIONS:  - Perform BMAT or MOVE assessment daily    - Set and communicate daily mobility goal to care team and patient/family/caregiver  - Collaborate with rehabilitation services on mobility goals if consulted  - Perform Range of Motion 3 times a day  - Reposition patient every 2 hours    - Dangle patient 3 times a day  - Stand patient 3 times a day  - Ambulate patient 3 times a day  - Out of bed to chair 3 times a day   - Out of bed for meals 3 times a day  - Out of bed for toileting  - Record patient progress and toleration of activity level   Outcome: Progressing     Problem: PAIN - ADULT  Goal: Verbalizes/displays adequate comfort level or baseline comfort level  Description: Interventions:  - Encourage patient to monitor pain and request assistance  - Assess pain using appropriate pain scale  - Administer analgesics based on type and severity of pain and evaluate response  - Implement non-pharmacological measures as appropriate and evaluate response  - Consider cultural and social influences on pain and pain management  - Notify physician/advanced practitioner if interventions unsuccessful or patient reports new pain  Outcome: Progressing     Problem: INFECTION - ADULT  Goal: Absence or prevention of progression during hospitalization  Description: INTERVENTIONS:  - Assess and monitor for signs and symptoms of infection  - Monitor lab/diagnostic results  - Monitor all insertion sites, i e  indwelling lines, tubes, and drains  - Monitor endotracheal if appropriate and nasal secretions for changes in amount and color  - [place] appropriate cooling/warming therapies per order  - Administer medications as ordered  - Instruct and encourage patient and family to use good hand hygiene technique  - Identify and instruct in appropriate isolation precautions for identified infection/condition  Outcome: Progressing  Goal: Absence of fever/infection during neutropenic period  Description: INTERVENTIONS:  - Monitor WBC    Outcome: Progressing     Problem: SAFETY ADULT  Goal: Maintain or return to baseline ADL function  Description: INTERVENTIONS:  -  Assess patient's ability to carry out ADLs; assess patient's baseline for ADL function and identify physical deficits which impact ability to perform ADLs (bathing, care of mouth/teeth, toileting, grooming, dressing, etc )  - Assess/evaluate cause of self-care deficits   - Assess range of motion  - Assess patient's mobility; develop plan if impaired  - Assess patient's need for assistive devices and provide as appropriate  - Encourage maximum independence but intervene and supervise when necessary  - Involve family in performance of ADLs  - Assess for home care needs following discharge   - Consider OT consult to assist with ADL evaluation and planning for discharge  - Provide patient education as appropriate  Outcome: Progressing  Goal: Maintains/Returns to pre admission functional level  Description: INTERVENTIONS:  - Perform BMAT or MOVE assessment daily    - Set and communicate daily mobility goal to care team and patient/family/caregiver  - Collaborate with rehabilitation services on mobility goals if consulted  - Perform Range of Motion 3 times a day  - Reposition patient every 2 hours    - Dangle patient 3 times a day  - Stand patient 3 times a day  - Ambulate patient 3 times a day  - Out of bed to chair 3 times a day   - Out of bed for meals 3 times a day  - Out of bed for toileting  - Record patient progress and toleration of activity level   Outcome: Progressing  Goal: Patient will remain free of falls  Description: INTERVENTIONS:  - Educate patient/family on patient safety including physical limitations  - Instruct patient to call for assistance with activity   - Consult OT/PT to assist with strengthening/mobility   - Keep Call bell within reach  - Keep bed low and locked with side rails adjusted as appropriate  - Keep care items and personal belongings within reach  - Initiate and maintain comfort rounds  - Make Fall Risk Sign visible to staff  - Offer Toileting every 2 Hours, in advance of need  - Initiate/Maintain bed and alarm alarm  - Apply yellow socks and bracelet for high fall risk patients  - Consider moving patient to room near nurses station  Outcome: Progressing     Problem: DISCHARGE PLANNING  Goal: Discharge to home or other facility with appropriate resources  Description: INTERVENTIONS:  - Identify barriers to discharge w/patient and caregiver  - Arrange for needed discharge resources and transportation as appropriate  - Identify discharge learning needs (meds, wound care, etc )  - Arrange for interpretive services to assist at discharge as needed  - Refer to Case Management Department for coordinating discharge planning if the patient needs post-hospital services based on physician/advanced practitioner order or complex needs related to functional status, cognitive ability, or social support system  Outcome: Progressing     Problem: Knowledge Deficit  Goal: Patient/family/caregiver demonstrates understanding of disease process, treatment plan, medications, and discharge instructions  Description: Complete learning assessment and assess knowledge base    Interventions:  - Provide teaching at level of understanding  - Provide teaching via preferred learning methods  Outcome: Progressing     Problem: GASTROINTESTINAL - ADULT  Goal: Minimal or absence of nausea and/or vomiting  Description: INTERVENTIONS:  - Administer IV fluids if ordered to ensure adequate hydration  - Maintain NPO status until nausea and vomiting are resolved  - Nasogastric tube if ordered  - Administer ordered antiemetic medications as needed  - Provide nonpharmacologic comfort measures as appropriate  - Advance diet as tolerated, if ordered  - Consider nutrition services referral to assist patient with adequate nutrition and appropriate food choices  Outcome: Progressing  Goal: Maintains or returns to baseline bowel function  Description: INTERVENTIONS:  - Assess bowel function  - Encourage oral fluids to ensure adequate hydration  - Administer IV fluids if ordered to ensure adequate hydration  - Administer ordered medications as needed  - Encourage mobilization and activity  - Consider nutritional services referral to assist patient with adequate nutrition and appropriate food choices  Outcome: Progressing  Goal: Maintains adequate nutritional intake  Description: INTERVENTIONS:  - Monitor percentage of each meal consumed  - Identify factors contributing to decreased intake, treat as appropriate  - Assist with meals as needed  - Monitor I&O, weight, and lab values if indicated  - Obtain nutrition services referral as needed  Outcome: Progressing

## 2023-03-15 NOTE — UTILIZATION REVIEW
Initial Clinical Review    Admission: Date/Time/Statement:   Admission Orders (From admission, onward)     Ordered        03/14/23 2127  Place in Observation  Once                      Orders Placed This Encounter   Procedures   • Place in Observation     Standing Status:   Standing     Number of Occurrences:   1     Order Specific Question:   Level of Care     Answer:   Med Surg [16]     ED Arrival Information     Expected   -    Arrival   3/14/2023 18:04    Acuity   Urgent            Means of arrival   Walk-In    Escorted by   Self    Service   Hospitalist    Admission type   Emergency            Arrival complaint   flank pain           Chief Complaint   Patient presents with   • Abdominal Pain     Patient states he started with upper left abdominal pain today around 8am today  Current pain is 8/10 at this time  Initial Presentation: 50 y o  male presents to ed from home for evaluation and treatment of LUQ abdominal pain 8/10  PMHX:   Clinical assessment significant for LLQ tenderness, heart rate 92- 111  MI, ANGIOPLASTY  Wbc 14 59  Imaging shows inflammation of descending colon  Recommend colonoscopy when inflammation improves to assess for underlying mass  Initially treated with iv mso4x1, iv  9% ns bolus, iv flagyl  Admit to observation for diverticulitis  Date: 3-15-23 Day 2: observation  Continue iv fluids   9% ns 100/hr and  iv piperacillin  Advance diet npo to full liquid to lo residue, continue analgesia as needed  Pain improved  Follow up blood cultures      ED Triage Vitals   03/14/23 1809 03/14/23 1809 03/14/23 1809 03/14/23 1809 03/14/23 1809   97 7 °F (36 5 °C) (!) 111 16 126/59 98 %      Temporal Monitor           Pain Score       8          03/14/23 132 kg (291 lb 10 7 oz)     Additional Vital Signs:     Date/Time Temp Pulse Resp BP MAP (mmHg) SpO2 O2 Device   03/15/23 07:10:14 98 2 °F (36 8 °C) 82 16 133/88 103 95 % None (Room air)   03/14/23 21:51:47 98 6 °F (37 °C) 92 18 112/73 86 96 % None (Room air)   03/14/23 21:51:16 98 6 °F (37 °C) 94 -- 112/73 86 96 % --   03/14/23 2130 -- 85 16 110/68 -- 97 % None (Room air)   03/14/23 2100 -- 87 17 113/70 87 96 % --   03/14/23 2000 -- 89 15 -- -- 96 % None (Room air)   03/14/23 1900 -- 97 16 104/57 71 95 % --   03/14/23 1830 -- 95 18 97/53 71 97 % --   03/14/23 1809 97 7 °F (36 5 °C) 111   Abnormal  16 126/59 -- 98 % None (Room air)             Pertinent Labs/Diagnostic Test Results:       Collection Time Result Time Vent R Atrial R FL Int  QRSD Int  QT Int  QTC Int  P Axis QRS Axis T Wave Ax    03/14/23 18:21:13 03/15/23 07:52:38 102 102 136 90 330 430 62 55 56      Sinus tachycardia   Low voltage QRS   Borderline ECG   When compared with ECG of 08-MAR-2022 15:09,   No significant change was found                   CT abdomen pelvis with contrast   Final  (03/14 2036)      Focal inflammation adjacent to the descending colon as described above  Differential diagnosis includes diverticulitis, or epiploic appendagitis  As mentioned above, the corresponding area of the colon shows focal decreased enhancement of uncertain etiology  Recommend follow-up colonoscopy when the acute symptoms subside to exclude an underlying mass if one has not been done recently       Results from last 7 days   Lab Units 03/15/23  0907   SARS-COV-2  Negative     Results from last 7 days   Lab Units 03/15/23  0432 03/14/23  2257 03/14/23  1826   WBC Thousand/uL 7 95  --  14 59*   HEMOGLOBIN g/dL 14 5  --  15 9   HEMATOCRIT % 42 2  --  46 0   PLATELETS Thousands/uL 221 254 282   NEUTROS ABS Thousands/µL 5 17  --  10 80*         Results from last 7 days   Lab Units 03/15/23  0432 03/14/23  1826   SODIUM mmol/L 137 133*   POTASSIUM mmol/L 3 8 3 9   CHLORIDE mmol/L 103 99   CO2 mmol/L 26 23   ANION GAP mmol/L 8 11   BUN mg/dL 8 9   CREATININE mg/dL 0 83 0 86   EGFR ml/min/1 73sq m 102 101   CALCIUM mg/dL 8 5 9 3   MAGNESIUM mg/dL 2 0  --    PHOSPHORUS mg/dL 3 4  -- Results from last 7 days   Lab Units 03/15/23  0432 03/14/23  1826   AST U/L 15 21   ALT U/L 21 29   ALK PHOS U/L 66 78   TOTAL PROTEIN g/dL 6 5 7 1   ALBUMIN g/dL 3 8 4 3   TOTAL BILIRUBIN mg/dL 1 07* 0 67         Results from last 7 days   Lab Units 03/15/23  0432 03/14/23  1826   GLUCOSE RANDOM mg/dL 110 128       Results from last 7 days   Lab Units 03/14/23  2257 03/14/23 2030 03/14/23  1826   HS TNI 0HR ng/L  --   --  2   HS TNI 2HR ng/L  --  <2  --    HSTNI D2 ng/L  --  <0  --    HS TNI 4HR ng/L 3  --   --    HSTNI D4 ng/L 1  --   --      Results from last 7 days   Lab Units 03/14/23  1826   D-DIMER QUANTITATIVE ug/ml FEU <0 27     Results from last 7 days   Lab Units 03/14/23  1826   PROTIME seconds 13 0   INR  0 96   PTT seconds 29         Results from last 7 days   Lab Units 03/14/23  2047   PROCALCITONIN ng/ml 0 14     Results from last 7 days   Lab Units 03/14/23  2257 03/14/23  2047   LACTIC ACID mmol/L 0 9 1 1       Results from last 7 days   Lab Units 03/14/23  1826   LIPASE u/L 24       Results from last 7 days   Lab Units 03/14/23  2037   CLARITY UA  Clear   COLOR UA  Yellow   SPEC GRAV UA  <=1 005   PH UA  6 5   GLUCOSE UA mg/dl Negative   KETONES UA mg/dl Negative   BLOOD UA  Negative   PROTEIN UA mg/dl Negative   NITRITE UA  Negative   BILIRUBIN UA  Negative   UROBILINOGEN UA E U /dl 0 2   LEUKOCYTES UA  Negative     Results from last 7 days   Lab Units 03/15/23  0907   INFLUENZA A PCR  Negative   INFLUENZA B PCR  Negative   RSV PCR  Negative       Results from last 7 days   Lab Units 03/14/23 2053 03/14/23 2047   BLOOD CULTURE  Received in Microbiology Lab  Culture in Progress  Received in Microbiology Lab  Culture in Progress         ED Treatment:   Medication Administration from 03/14/2023 1804 to 03/14/2023 2146       Date/Time Order Dose Route Action     03/14/2023 2105 EDT morphine injection 4 mg 4 mg Intravenous Given     03/14/2023 2048 EDT sodium chloride 0 9 % bolus 1,000 mL 1,000 mL Intravenous New Bag     03/14/2023 2104 EDT metroNIDAZOLE (FLAGYL) IVPB (premix) 500 mg 100 mL 500 mg Intravenous New Bag        Past Medical History:   Diagnosis Date   • Heart attack ([facility] ) 06/28/2021     Present on Admission:  • Diverticulitis  • Coronary artery disease involving native heart without angina pectoris  • Tobacco use  • Depression with anxiety      Admitting Diagnosis:     Diverticulitis [K57 92]  Leukocytosis [D72 829]  Flank pain [R10 9]    Age/Sex: 50 y o  male    Scheduled Medications:    aspirin, 81 mg, Oral, Daily  atorvastatin, 40 mg, Oral, Daily With Dinner  clopidogrel, 75 mg, Oral, Daily  enoxaparin, 40 mg, Subcutaneous, Daily  fluticasone, 2 puff, Inhalation, BID  guaiFENesin, 600 mg, Oral, Q12H [address]  nicotine, 1 patch, Transdermal, Daily  piperacillin-tazobactam, 3 375 g, Intravenous, Q8H      Continuous IV Infusions:  sodium chloride, 100 mL/hr, Intravenous, Continuous      PRN Meds:  acetaminophen, 650 mg, Oral, Q6H PRN  albuterol, 2 puff, Inhalation, Q4H PRN  benzonatate, 100 mg, Oral, TID PRN  morphine injection, 2 mg, Intravenous, Q3H PRN  ondansetron, 4 mg, Intravenous, Q6H PRN        None    Network Utilization Review Department  ATTENTION: Please call with any questions or concerns to [phone] and carefully listen to the prompts so that you are directed to the right person  All voicemails are confidential   Arley Coffman all requests for admission clinical reviews, approved or denied determinations and any other requests to dedicated fax number below belonging to the campus where the patient is receiving treatment   List of dedicated fax numbers for the Facilities:  [address] [place] [address] DENIALS (Administrative/Medical Necessity) [phone]   [address] [place] [address] (Maternity/NICU/Pediatrics) [phone]   [address] [phone]   [facility] [phone] [address] [place] [address] Claudia Cifuentes [facility] 28 [address] [facility] 310 [facility] [address] [phone]

## 2023-03-15 NOTE — DISCHARGE INSTR - AVS FIRST PAGE
Please take Augmentin twice daily for 10 days for your diverticulitis   Please follow up with gastroenterology in about 2 weeks, they will call you to schedule an appointment   A referral to behavioral health was sent, they will call to schedule an appointment   You will need to follow up with gastroenterology for colonoscopy in the future to follow up on your CT scan findings   Please follow up with your PCP in 1-2 weeks if your upper respiratory symptoms do not improve   You may take Mucinex twice daily as needed for congestion and Tessalon Perles as needed for cough   Maintain low fiber/low fat diet   Return to the ER if you experience worsening of symptoms

## 2023-03-15 NOTE — H&P
H&P Exam - Noris Milian 50 y o  male MRN: [id number]    Unit/Bed#: 300-63 Encounter: [id number]        History of Present Illness     HPI:  Noris Milian is a 50 y o  male with a pmhx of CAD s/p MI s/p mid LAD PCI on dual antiplatelets, mild hyperlipidemia, obesity, tobacco used disorder that presents to the ED with c/o left upper abdominal pain that started earlier this morning  He has had some abdominal pain before but never like this  No associated nausea or vomiting  No shortness of breath  No abdominal distension  No flank pain  No cough  No hematemesis, no diarrhea or rectal bleeding  No urinary symptoms  No chest pain  Pt had a CT of the abdomen done in the ED which showed an area of focal inflammation suggestive of possible diverticulitis  His WBC was 14k  Lactic acid level within normal limits           Historical Information   Past Medical History:   Diagnosis Date   • Heart attack ([facility] ) 06/28/2021     Patient Active Problem List   Diagnosis   • Type 1 myocardial infarction [facility])   • Acute ST elevation myocardial infarction (STEMI) due to occlusion of mid portion of left anterior descending (LAD) coronary artery ([facility] )   • Coronary artery disease involving native heart without angina pectoris   • Tobacco use   • Dyslipidemia   • Traumatic brain injury   • Tobacco dependence syndrome   • Morbid obesity with BMI of 40 0-44 9, adult ([facility] )   • Hyperglycemia   • Fatigue   • Family history of acute myocardial infarction   • Depression with anxiety     Past Surgical History:   Procedure Laterality Date   • CORONARY ANGIOPLASTY     • FINGER FRACTURE SURGERY     • MOUTH SURGERY         Social History   Social History     Substance and Sexual Activity   Alcohol Use Not Currently     Social History     Substance and Sexual Activity   Drug Use Not Currently   • Types: Marijuana    Comment: 2016 last used     Social History     Tobacco Use   Smoking Status Every Day   • Packs/day: 1 00   • Years: 22 00   • Pack years: 22 00 • Types: Cigarettes   • Last attempt to quit: 2021   • Years since quittin 7   Smokeless Tobacco Never       Family History:   Family History   Problem Relation Age of Onset   • Hypertension Mother    • Breast cancer Mother    • Heart attack Father    • Hyperlipidemia Father    • Hyperlipidemia Sister    • Heart disease Maternal Grandmother    • Heart disease Maternal Grandfather    • Heart attack Maternal Grandfather        Meds/Allergies       Current Facility-Administered Medications:   •  acetaminophen (TYLENOL) tablet 650 mg, 650 mg, Oral, Q6H PRN, Annika Duarte MD  •  [START ON 3/15/2023] enoxaparin (LOVENOX) subcutaneous injection 40 mg, 40 mg, Subcutaneous, Daily, Annika Duarte MD  •  [START ON 3/15/2023] nicotine (NICODERM CQ) 21 mg/24 hr TD 24 hr patch 1 patch, 1 patch, Transdermal, Daily, Annika Duarte MD  •  ondansetron (ZOFRAN) injection 4 mg, 4 mg, Intravenous, Q6H PRN, Annika Duarte MD  •  piperacillin-tazobactam (ZOSYN) 3 375 g in sodium chloride 0 9 % 100 mL IVPB, 3 375 g, Intravenous, Q8H, Annika Duarte MD  •  piperacillin-tazobactam (ZOSYN) IVPB 3 375 g, 3 375 g, Intravenous, Once, Khari Smith III, DO  •  sodium chloride 0 9 % infusion, 100 mL/hr, Intravenous, Continuous, Annika Duarte MD    Allergies   Allergen Reactions   • Bupropion Itching       Review of Systems  A detailed 12 point review of systems was conducted and is negative apart from those mentioned in the HPI  Objective   Vitals: Blood pressure 112/73, pulse 92, temperature 98 6 °F (37 °C), temperature source Oral, resp  rate 18, height 5' 10 5" (1 791 m), weight 132 kg (291 lb 10 7 oz), SpO2 96 %  Physical Exam   General- Awake and alert, obese, NAD  HEENT: PERRLA, EOMI, sclera anicteric, dry mucous membranes, tongue mucosa dry without lesions  Neck: supple, no JVD, lymphadenopathy, thyromegaly  Heart: Regular rate and rhythm, S1S2 present  No murmur, rub or gallop  Lungs; Clear to auscultation bilaterally  No wheezing, crackles or rhonchi  No accessory muscle use or respiratory distress  Abdomen: soft, noted with tenderness in the LUQ, abdomen non-distended, NABS  No guarding or rebound  No peritoneal sound or mass  Extremities: no clubbing, cyanosis, or edema  2+ pedal pulses bilaterally  Full range of motion  Neurologic:  Cranial nerves II-XII intact  Strength and sensation globally intact  Speech fluent and goal directed  Awake, alert and oriented x 3  Skin: warm and dry  No petechiae, purpura or rash  Lab Results:   Results from last 7 days   Lab Units 03/14/23  1826   WBC Thousand/uL 14 59*   HEMOGLOBIN g/dL 15 9   HEMATOCRIT % 46 0   PLATELETS Thousands/uL 282     Results from last 7 days   Lab Units 03/14/23  1826   POTASSIUM mmol/L 3 9   CHLORIDE mmol/L 99   CO2 mmol/L 23   BUN mg/dL 9   CREATININE mg/dL 0 86   CALCIUM mg/dL 9 3         Imaging:  CT abdomen pelvis with contrast   Final Result by Andre Saez MD (03/14 2036)      Focal inflammation adjacent to the descending colon as described above  Differential diagnosis includes diverticulitis, or epiploic appendagitis  As mentioned above, the corresponding area of the colon shows focal decreased enhancement of uncertain    etiology  Recommend follow-up colonoscopy when the acute symptoms subside to exclude an underlying mass if one has not been done recently  The study was marked in [facility] for immediate notification  Workstation performed: [id number]               EKG, Pathology, and Other Studies:  Sinus tachycardia with no acute st-t changes    Assessment/Plan     1  Sepsis  Based on leukocytosis and tachycardia on presentation in the setting of acute diverticulitis  Follow up on blood culture  Continue with IV abx    2  LT sided uncomplicated diverticulitis  CT abdomen/pelvis noted with focal inflammation adjacent to the descending colon as described above    Differential diagnosis includes diverticulitis, or epiploic appendagitis  Will keep NPO for bowel rest  Continue with analgesia for better pain control  GI consult in AM  Continue IV abx with Zosyn    3  Abdominal pain sec to #1  Keeping NPO for now  Continue analgesia for better pain control    4  CAD s/p mid LAD PCI  Pt on dual antiplatelets  Continue meds    5 Hypertension  BP relatively soft  Will hold his metoprolol for now  If BP stable will restart    6  Hyperlipidemia  Continue with statins    7  Obesity class 3  Needs lifestyle modification  OP follow up with PCP  Code Status: Level 1 - Full Code      Counseling / Coordination of Care  Total floor / unit time spent today 75 minutes  Greater than 50% of total time was spent with the patient and / or family counseling and / or coordination of care  Portions of the record may have been created with voice recognition software  Occasional wrong word or "sound a like" substitutions may have occurred due to the inherent limitations of voice recognition software  Read the chart carefully and recognize, using context, where substitutions have occurred

## 2023-03-15 NOTE — TELEPHONE ENCOUNTER
Hi,    Can we get him set up for a consultation in the office for next week? (he was admitted overnight but will likely be discharged soon)  Thank you!

## 2023-03-15 NOTE — DISCHARGE SUMMARY
[address] [facility] [address]  Discharge- Rosamaria Younger 1970, 50 y o  male MRN: [id number]  Unit/Bed#: 648-25 Encounter: [id number]  Primary Care Provider: Haroldo Borges MD   Date and time admitted to hospital: 3/14/2023  6:04 PM    * Diverticulitis  Assessment & Plan  Background: Patient presents with LLQ abdominal pain  No nausea/vomiting, diarrhea  · CT shows focal inflammation adjacent to the descending colon as described above  Differential diagnosis includes diverticulitis, or epiploic appendagitis  · Abdominal pain, leukocytosis, and tachycardia has resolved  Patient is tolerating oral diet   · Currently on IV Zosyn, will discharge on oral Augmentin BID x 10 days  · Discussed with GI, given resolution of symptoms okay for follow up with GI in 2 weeks   · Recommend low fiber, low fat diet at discharge    Sepsis (HCC)-resolved as of 3/15/2023  Assessment & Plan  · POA a/e/b leukocytosis and tachycardia   · 2/2 suspected diverticulitis vs epiploic appendagitis   · IV Zosyn   · Resolved     Abnormal CT of the abdomen  Assessment & Plan  · Focal inflammation adjacent to the descending colon as described above  Differential diagnosis includes diverticulitis, or epiploic appendagitis  As mentioned above, the corresponding area of the colon shows focal decreased enhancement of uncertain etiology     · Recommend follow-up colonoscopy when the acute symptoms subside to exclude an underlying mass if one has not been done recently  · Outpatient referral to GI     URI (upper respiratory infection)  Assessment & Plan  · Patient endorses sudden onset of sore throat, cough, sneezing, congestion that started today   · Covid/flu/rsv swab negative   · Suspect viral URI   · Supportive care   · Will discharge with Mucinex and Tessalon perles   · Follow up with PCP if sx do not improve in 1 week     Depression with anxiety  Assessment & Plan  · Requesting referral to behavioral health- ambulatory referral placed     Morbid obesity with BMI of 40 0-44 9, adult ([facility])  Assessment & Plan  · BMI 41 26  · Encourage weight loss/lifestyle modification    Tobacco use  Assessment & Plan  · Encourage cessation   NRT    Coronary artery disease involving native heart without angina pectoris  Assessment & Plan  · CAD s/p MI s/p mid LAD PCI  · Continue dual antiplatelets, BB      Medical Problems     Resolved Problems  Date Reviewed: 3/15/2023          Resolved    Sepsis ([address] ) 3/15/2023     Resolved by  Deborah Leach PA-C        Discharging Physician / Practitioner: Deborah Leach PA-C  PCP: Juice Giordano MD  Admission Date:   Admission Orders (From admission, onward)     Ordered        03/14/23 2127  Place in Observation  Once                      Discharge Date: 03/15/23    Consultations During Hospital Stay:  · None     Procedures Performed:   · None     Significant Findings / Test Results:   CT abdomen pelvis with contrast   Final Result by Kim Coto MD (03/14 2036)      Focal inflammation adjacent to the descending colon as described above  Differential diagnosis includes diverticulitis, or epiploic appendagitis  As mentioned above, the corresponding area of the colon shows focal decreased enhancement of uncertain    etiology  Recommend follow-up colonoscopy when the acute symptoms subside to exclude an underlying mass if one has not been done recently  The study was marked in [facility] for immediate notification  Workstation performed: [id number]             Incidental Findings:   · Focal inflammation adjacent to the descending colon as described above  Differential diagnosis includes diverticulitis, or epiploic appendagitis  As mentioned above, the corresponding area of the colon shows focal decreased enhancement of uncertain etiology    Recommend follow-up colonoscopy when the acute symptoms subside to exclude an underlying mass if one has not been done recently  · I reviewed the above mentioned incidental findings with the patient and/or family and they expressed understanding  Test Results Pending at Discharge (will require follow up): · Blood cultures x2      Outpatient Tests Requested:  · Follow up with GI within 2 weeks   · Follow up with PCP in 1-2 weeks     Complications:  None     Reason for Admission: acute diverticulitis     Hospital Course:   Laura Crane is a 50 y o  male patient who originally presented to the hospital on 3/14/2023 due to LLQ abdominal pain without nausea/vomiting/diarrhea  Patient meeting sepsis criteria on admission due to leukocytosis and tachycardia  Was placed on IV Zosyn  Prior to discharge, patient's abdominal pain has resolved and tolerating oral diet  He will be discharged with Augmentin x 10 days  He needs to follow up with GI within 2 weeks  Additionally, patient noted to have new onset URI symptoms that started today, COVID/flu/RSV negative  Supportive care and follow up with PCP if sx do not improve in 1 week  There was an incidental finding on CT scan in the descending colon showing decreased enhancement of uncertain etiology  Recommendation is for outpatient colonoscopy to rule out underlying mass once acute symptoms of diverticulitis subside  Patient requesting for counseling assistance for his depression/anxiety  Behavioral health referral placed  Please see above list of diagnoses and related plan for additional information  Condition at Discharge: good    Discharge Day Visit / Exam:   Subjective:  Patient reports resolution of his abdominal pain however endorses new onset cough, congestion, sore throat, and feels like he is getting sick  No nausea/vomiting/diarrhea     Vitals: Blood Pressure: 133/88 (03/15/23 0710)  Pulse: 82 (03/15/23 0710)  Temperature: 98 2 °F (36 8 °C) (03/15/23 0710)  Temp Source: Oral (03/15/23 0710)  Respirations: 16 (03/15/23 0710)  Height: 5' 10 5" (179 1 cm) (03/14/23 2151)  Weight - Scale: 132 kg (291 lb 10 7 oz) (03/14/23 2151)  SpO2: 95 % (03/15/23 0710)  Exam:   Physical Exam  Constitutional:       General: He is not in acute distress  Appearance: He is obese  HENT:      Nose: Congestion present  Mouth/Throat:      Mouth: Mucous membranes are dry  Eyes:      General: No scleral icterus  Cardiovascular:      Rate and Rhythm: Normal rate and regular rhythm  Heart sounds: Normal heart sounds  Pulmonary:      Breath sounds: Normal breath sounds  Abdominal:      General: Abdomen is flat  Bowel sounds are normal  There is no distension  Palpations: Abdomen is soft  Tenderness: There is no abdominal tenderness  There is no guarding  Musculoskeletal:      Right lower leg: No edema  Left lower leg: No edema  Skin:     General: Skin is warm  Neurological:      Mental Status: He is alert and oriented to person, place, and time  Psychiatric:         Mood and Affect: Mood normal           Discussion with Family: Patient declined call to   Discharge instructions/Information to patient and family:   See after visit summary for information provided to patient and family  Provisions for Follow-Up Care:  See after visit summary for information related to follow-up care and any pertinent home health orders  Disposition:   Home    Planned Readmission: none      Discharge Statement:  I spent 40 minutes discharging the patient  This time was spent on the day of discharge  I had direct contact with the patient on the day of discharge  Greater than 50% of the total time was spent examining patient, answering all patient questions, arranging and discussing plan of care with patient as well as directly providing post-discharge instructions  Additional time then spent on discharge activities  Discharge Medications:  See after visit summary for reconciled discharge medications provided to patient and/or family        **Please Note: This note may have been constructed using a voice recognition system**

## 2023-03-15 NOTE — ASSESSMENT & PLAN NOTE
· Patient endorses sudden onset of sore throat, cough, sneezing, congestion that started today   · Covid/flu/rsv swab negative   · Suspect viral URI   · Supportive care   · Will discharge with Mucinex and Tessalon perles   · Follow up with PCP if sx do not improve in 1 week

## 2023-03-15 NOTE — PLAN OF CARE
Problem: MOBILITY - ADULT  Goal: Maintain or return to baseline ADL function  Description: INTERVENTIONS:  -  Assess patient's ability to carry out ADLs; assess patient's baseline for ADL function and identify physical deficits which impact ability to perform ADLs (bathing, care of mouth/teeth, toileting, grooming, dressing, etc )  - Assess/evaluate cause of self-care deficits   - Assess range of motion  - Assess patient's mobility; develop plan if impaired  - Assess patient's need for assistive devices and provide as appropriate  - Encourage maximum independence but intervene and supervise when necessary  - Involve family in performance of ADLs  - Assess for home care needs following discharge   - Consider OT consult to assist with ADL evaluation and planning for discharge  - Provide patient education as appropriate  3/15/2023 1425 by Ke Adamson LPN  Outcome: Adequate for Discharge  3/15/2023 0755 by Ke Adamson LPN  Outcome: Progressing  Goal: Maintains/Returns to pre admission functional level  Description: INTERVENTIONS:  - Perform BMAT or MOVE assessment daily    - Set and communicate daily mobility goal to care team and patient/family/caregiver  - Collaborate with rehabilitation services on mobility goals if consulted  - Perform Range of Motion 3 times a day  - Reposition patient every 2 hours    - Dangle patient 3 times a day  - Stand patient 3 times a day  - Ambulate patient 3 times a day  - Out of bed to chair 3 times a day   - Out of bed for meals 3 times a day  - Out of bed for toileting  - Record patient progress and toleration of activity level   3/15/2023 1425 by Ke Adamson LPN  Outcome: Adequate for Discharge  3/15/2023 0755 by Ke Adamson LPN  Outcome: Progressing     Problem: PAIN - ADULT  Goal: Verbalizes/displays adequate comfort level or baseline comfort level  Description: Interventions:  - Encourage patient to monitor pain and request assistance  - Assess pain using appropriate pain scale  - Administer analgesics based on type and severity of pain and evaluate response  - Implement non-pharmacological measures as appropriate and evaluate response  - Consider cultural and social influences on pain and pain management  - Notify physician/advanced practitioner if interventions unsuccessful or patient reports new pain  3/15/2023 1425 by El Villagomez LPN  Outcome: Adequate for Discharge  3/15/2023 0755 by El Villagomez LPN  Outcome: Progressing     Problem: INFECTION - ADULT  Goal: Absence or prevention of progression during hospitalization  Description: INTERVENTIONS:  - Assess and monitor for signs and symptoms of infection  - Monitor lab/diagnostic results  - Monitor all insertion sites, i e  indwelling lines, tubes, and drains  - Monitor endotracheal if appropriate and nasal secretions for changes in amount and color  - [place] appropriate cooling/warming therapies per order  - Administer medications as ordered  - Instruct and encourage patient and family to use good hand hygiene technique  - Identify and instruct in appropriate isolation precautions for identified infection/condition  3/15/2023 1425 by El Villagomez LPN  Outcome: Adequate for Discharge  3/15/2023 0755 by El Villagomez LPN  Outcome: Progressing  Goal: Absence of fever/infection during neutropenic period  Description: INTERVENTIONS:  - Monitor WBC    3/15/2023 1425 by El Villagomez LPN  Outcome: Adequate for Discharge  3/15/2023 0755 by El Villagomez LPN  Outcome: Progressing     Problem: SAFETY ADULT  Goal: Maintain or return to baseline ADL function  Description: INTERVENTIONS:  -  Assess patient's ability to carry out ADLs; assess patient's baseline for ADL function and identify physical deficits which impact ability to perform ADLs (bathing, care of mouth/teeth, toileting, grooming, dressing, etc )  - Assess/evaluate cause of self-care deficits   - Assess range of motion  - Assess patient's mobility; develop plan if impaired  - Assess patient's need for assistive devices and provide as appropriate  - Encourage maximum independence but intervene and supervise when necessary  - Involve family in performance of ADLs  - Assess for home care needs following discharge   - Consider OT consult to assist with ADL evaluation and planning for discharge  - Provide patient education as appropriate  3/15/2023 1425 by Pamela Philip LPN  Outcome: Adequate for Discharge  3/15/2023 0755 by Pamela Philip LPN  Outcome: Progressing  Goal: Maintains/Returns to pre admission functional level  Description: INTERVENTIONS:  - Perform BMAT or MOVE assessment daily    - Set and communicate daily mobility goal to care team and patient/family/caregiver  - Collaborate with rehabilitation services on mobility goals if consulted  - Perform Range of Motion 3 times a day  - Reposition patient every 2 hours    - Dangle patient 3 times a day  - Stand patient 3 times a day  - Ambulate patient 3 times a day  - Out of bed to chair 3 times a day   - Out of bed for meals 3 times a day  - Out of bed for toileting  - Record patient progress and toleration of activity level   3/15/2023 1425 by Pamela Philip LPN  Outcome: Adequate for Discharge  3/15/2023 0755 by Pamela Philip LPN  Outcome: Progressing  Goal: Patient will remain free of falls  Description: INTERVENTIONS:  - Educate patient/family on patient safety including physical limitations  - Instruct patient to call for assistance with activity   - Consult OT/PT to assist with strengthening/mobility   - Keep Call bell within reach  - Keep bed low and locked with side rails adjusted as appropriate  - Keep care items and personal belongings within reach  - Initiate and maintain comfort rounds  - Make Fall Risk Sign visible to staff  - Offer Toileting every 2 Hours, in advance of need  - Initiate/Maintain bed and alarm alarm  - Apply yellow socks and bracelet for high fall risk patients  - Consider moving patient to room near nurses station  3/15/2023 1425 by Nathan Adams LPN  Outcome: Adequate for Discharge  3/15/2023 0755 by Nathan Adams LPN  Outcome: Progressing     Problem: DISCHARGE PLANNING  Goal: Discharge to home or other facility with appropriate resources  Description: INTERVENTIONS:  - Identify barriers to discharge w/patient and caregiver  - Arrange for needed discharge resources and transportation as appropriate  - Identify discharge learning needs (meds, wound care, etc )  - Arrange for interpretive services to assist at discharge as needed  - Refer to Case Management Department for coordinating discharge planning if the patient needs post-hospital services based on physician/advanced practitioner order or complex needs related to functional status, cognitive ability, or social support system  3/15/2023 1425 by Nathan Adams LPN  Outcome: Adequate for Discharge  3/15/2023 0755 by Nathan Adams LPN  Outcome: Progressing     Problem: Knowledge Deficit  Goal: Patient/family/caregiver demonstrates understanding of disease process, treatment plan, medications, and discharge instructions  Description: Complete learning assessment and assess knowledge base    Interventions:  - Provide teaching at level of understanding  - Provide teaching via preferred learning methods  3/15/2023 1425 by Nathan Adams LPN  Outcome: Adequate for Discharge  3/15/2023 0755 by Nathan Adams LPN  Outcome: Progressing     Problem: GASTROINTESTINAL - ADULT  Goal: Minimal or absence of nausea and/or vomiting  Description: INTERVENTIONS:  - Administer IV fluids if ordered to ensure adequate hydration  - Maintain NPO status until nausea and vomiting are resolved  - Nasogastric tube if ordered  - Administer ordered antiemetic medications as needed  - Provide nonpharmacologic comfort measures as appropriate  - Advance diet as tolerated, if ordered  - Consider nutrition services referral to assist patient with adequate nutrition and appropriate food choices  3/15/2023 1425 by Raymundo Mendoza LPN  Outcome: Adequate for Discharge  3/15/2023 0755 by Raymundo Mendoza LPN  Outcome: Progressing  Goal: Maintains or returns to baseline bowel function  Description: INTERVENTIONS:  - Assess bowel function  - Encourage oral fluids to ensure adequate hydration  - Administer IV fluids if ordered to ensure adequate hydration  - Administer ordered medications as needed  - Encourage mobilization and activity  - Consider nutritional services referral to assist patient with adequate nutrition and appropriate food choices  3/15/2023 1425 by Raymundo Mendoza LPN  Outcome: Adequate for Discharge  3/15/2023 0755 by Raymundo Mendoza LPN  Outcome: Progressing  Goal: Maintains adequate nutritional intake  Description: INTERVENTIONS:  - Monitor percentage of each meal consumed  - Identify factors contributing to decreased intake, treat as appropriate  - Assist with meals as needed  - Monitor I&O, weight, and lab values if indicated  - Obtain nutrition services referral as needed  3/15/2023 1425 by Raymundo Mendoza LPN  Outcome: Adequate for Discharge  3/15/2023 0755 by Raymundo Mendoza LPN  Outcome: Progressing     Problem: Nutrition/Hydration-ADULT  Goal: Nutrient/Hydration intake appropriate for improving, restoring or maintaining nutritional needs  Description: Monitor and assess patient's nutrition/hydration status for malnutrition  Collaborate with interdisciplinary team and initiate plan and interventions as ordered  Monitor patient's weight and dietary intake as ordered or per policy  Utilize nutrition screening tool and intervene as necessary  Determine patient's food preferences and provide high-protein, high-caloric foods as appropriate       INTERVENTIONS:  - Monitor oral intake, urinary output, labs, and treatment plans  - Assess nutrition and hydration status and recommend course of action  - Evaluate amount of meals eaten  - Assist patient with eating if necessary   - Allow adequate time for meals  - Recommend/ encourage appropriate diets, oral nutritional supplements, and vitamin/mineral supplements  - Order, calculate, and assess calorie counts as needed  - Recommend, monitor, and adjust tube feedings and TPN/PPN based on assessed needs  - Assess need for intravenous fluids  - Provide specific nutrition/hydration education as appropriate  - Include patient/family/caregiver in decisions related to nutrition  Outcome: Adequate for Discharge

## 2023-03-15 NOTE — ASSESSMENT & PLAN NOTE
· Focal inflammation adjacent to the descending colon as described above  Differential diagnosis includes diverticulitis, or epiploic appendagitis  As mentioned above, the corresponding area of the colon shows focal decreased enhancement of uncertain etiology     · Recommend follow-up colonoscopy when the acute symptoms subside to exclude an underlying mass if one has not been done recently  · Outpatient referral to GI

## 2023-03-15 NOTE — ASSESSMENT & PLAN NOTE
· POA a/e/b leukocytosis and tachycardia   · 2/2 suspected diverticulitis vs epiploic appendagitis   · IV Zosyn   · Resolved

## 2023-03-15 NOTE — CASE MANAGEMENT
Case Management Discharge Planning Note    Patient name Amita Martínez  Location [address] [id number] MRN [id number]  : 1970 Date 3/15/2023       Current Admission Date: 3/14/2023  Current Admission Diagnosis:Diverticulitis   Patient Active Problem List    Diagnosis Date Noted   • URI (upper respiratory infection) 03/15/2023   • Abnormal CT of the abdomen 03/15/2023   • Diverticulitis 2023   • Morbid obesity with BMI of 40 0-44 9, adult ([facility] ) 2022   • Coronary artery disease involving native heart without angina pectoris 2021   • Tobacco use 2021   • Dyslipidemia 2021   • Type 1 myocardial infarction ([facility] ) 2021   • Acute ST elevation myocardial infarction (STEMI) due to occlusion of mid portion of left anterior descending (LAD) coronary artery ([place] [address] ) 2021   • Depression with anxiety 2014   • Tobacco dependence syndrome 2013   • Traumatic brain injury 01/15/2013   • Hyperglycemia 01/15/2013   • Fatigue 01/15/2013   • Family history of acute myocardial infarction 01/15/2013      LOS (days): 0  Geometric Mean LOS (GMLOS) (days):   Days to GMLOS:     OBJECTIVE:            Current admission status: Observation   Preferred Pharmacy:   [address] [place] [address] GUNJAN Grant [address] [address]  Phone: [phone] Fax: Rose Magana [address] [place] [address]  Phone: [phone] Fax: [phone]    Primary Care Provider: Judson Murillo MD    Primary Insurance: [address] [place] [address]  Secondary Insurance:     DISCHARGE DETAILS:  Pt is being dc'd home on this date with OP follow up

## 2023-03-15 NOTE — ASSESSMENT & PLAN NOTE
Background: Patient presents with LLQ abdominal pain  No nausea/vomiting, diarrhea  · CT shows focal inflammation adjacent to the descending colon as described above  Differential diagnosis includes diverticulitis, or epiploic appendagitis  · Abdominal pain, leukocytosis, and tachycardia has resolved   Patient is tolerating oral diet   · Currently on IV Zosyn, will discharge on oral Augmentin BID x 10 days  · Discussed with GI, given resolution of symptoms okay for follow up with GI in 2 weeks   · Recommend low fiber, low fat diet at discharge

## 2023-03-15 NOTE — NURSING NOTE
Patient left floor in stable condition during ambulation  Discharge instructions reviewed with verbal understanding obtained

## 2023-03-19 LAB
BACTERIA BLD CULT: NORMAL
BACTERIA BLD CULT: NORMAL

## 2023-03-20 LAB
BACTERIA BLD CULT: NORMAL
BACTERIA BLD CULT: NORMAL

## 2023-05-09 ENCOUNTER — TELEPHONE (OUTPATIENT)
Dept: PSYCHIATRY | Facility: CLINIC | Age: 53
End: 2023-05-09

## 2023-06-08 DIAGNOSIS — I21.9 TYPE 1 MYOCARDIAL INFARCTION (HCC): ICD-10-CM

## 2023-06-08 DIAGNOSIS — I21.02 ACUTE ST ELEVATION MYOCARDIAL INFARCTION (STEMI) DUE TO OCCLUSION OF MID PORTION OF LEFT ANTERIOR DESCENDING (LAD) CORONARY ARTERY (HCC): ICD-10-CM

## 2023-06-12 DIAGNOSIS — I21.02 ACUTE ST ELEVATION MYOCARDIAL INFARCTION (STEMI) DUE TO OCCLUSION OF MID PORTION OF LEFT ANTERIOR DESCENDING (LAD) CORONARY ARTERY (HCC): ICD-10-CM

## 2023-06-12 DIAGNOSIS — I21.9 TYPE 1 MYOCARDIAL INFARCTION (HCC): ICD-10-CM

## 2023-06-12 RX ORDER — CLOPIDOGREL BISULFATE 75 MG/1
TABLET ORAL
Qty: 90 TABLET | Refills: 3 | Status: SHIPPED | OUTPATIENT
Start: 2023-06-12

## 2023-06-12 RX ORDER — LISINOPRIL 5 MG/1
TABLET ORAL
Qty: 90 TABLET | Refills: 3 | Status: SHIPPED | OUTPATIENT
Start: 2023-06-12

## 2023-06-12 RX ORDER — ASPIRIN 81 MG/1
TABLET, COATED ORAL
Qty: 90 TABLET | Refills: 3 | Status: SHIPPED | OUTPATIENT
Start: 2023-06-12

## 2023-06-21 NOTE — PROGRESS NOTES
Cardiology Follow Up    Ligia Gan  1970  [address] [facility]  [address]  [facility] [address] [place] [address]  [phone]    1  Coronary artery disease involving native heart without angina pectoris, unspecified vessel or lesion type  Lipid Panel with Direct LDL reflex    metoprolol succinate (TOPROL-XL) 25 mg 24 hr tablet      2  Tobacco use        3  Dyslipidemia  Lipid Panel with Direct LDL reflex          Discussion/Summary:    CAD   Prior STEMI in June 2021 with ALVARADO to LAD  EST performed 8-2021 secondary to c/o SOB; no ischemic noted  Today he offers no c/o chest pain or anginal equivalent  continue metoprolol succinate 50 mg QD, aspirin 81 mg QD, Lipitor 40 mg QD and Plavix 75 mg QD    Hyperlipidemia  Last Lipid panel from December 2021  Triglycerides 262  HDL 31  LDL 98  He had previously been prescribed Lipitor 40 mg QD but has not been taking for over a year due to concerns of side effects  He states he will restart this med; he will recheck Lipids in the Fall     Tobacco abuse  Current smoker--cigars and vaping  Complete smoking cessation advised    He will return to the office in one year to follow up with Dr Brit Thurman    Interval History:   Ligia Gan is a 50 y o  male with PMH of CAD, HLD and tobacco abuse who returns to the office today as he is overdue for follow up visit  Today the patient has no acute cardiac complaints  He is a  with the department of transportation and does have to remove equipment from his truck and set it up and then subsequently placed the equipment back in the truck and he is able to carry this appointment with no complaints of chest discomfort, dyspnea on exertion or palpitations  He has no lower extremity edema or orthopnea and also has no issues with dizziness/lightheadedness or near syncope/syncope    The patient is overdue for his office visit and did run out of his metoprolol a few weeks ago but this has now been refilled  The patient also had discontinued his statin at least over a year ago secondary to concerns for side effects namely dementia as his family members had advised him not to take this medication secondary to those potential side effects  We did review the clinical implications of hyperlipidemia in the setting of his known CAD and he has agreed to restart this medication  I have provided him with a lipid panel and asked him to have this drawn in the fall a few months after restarting his cholesterol medication  I did advise the patient to begin to adopt a more regular cardiovascular exercise routine as he currently does not regularly exercise  I have also asked him to continue to follow a low-fat/low-cholesterol/low-sodium diet  He does continue to smoke cigars and has been vaping I have strongly encouraged him to abstain  Overall, he is stable from a cardiac standpoint we will follow-up in our office in 1 year      Medical Problems     Problem List     Type 1 myocardial infarction [facility])    Acute ST elevation myocardial infarction (STEMI) due to occlusion of mid portion of left anterior descending (LAD) coronary artery ([facility])    Coronary artery disease involving native heart without angina pectoris    Tobacco use    Dyslipidemia    Traumatic brain injury ([facility] )    Tobacco dependence syndrome    Morbid obesity with BMI of 40 0-44 9, adult (HCC)    Hyperglycemia    Fatigue    Family history of acute myocardial infarction    Depression with anxiety    Diverticulitis    URI (upper respiratory infection)    Abnormal CT of the abdomen        Past Medical History:   Diagnosis Date   • Heart attack ([facility] ) 06/28/2021     Social History     Socioeconomic History   • Marital status: Single     Spouse name: Not on file   • Number of children: Not on file   • Years of education: Not on file   • Highest education level: Not on file   Occupational History   • Not on file Tobacco Use   • Smoking status: Every Day     Packs/day: 1 00     Years: 22 00     Total pack years: 22 00     Types: Cigarettes     Last attempt to quit: 2021     Years since quittin 9   • Smokeless tobacco: Never   Vaping Use   • Vaping Use: Every day   • Substances: Nicotine   Substance and Sexual Activity   • Alcohol use: Not Currently   • Drug use: Not Currently     Types: Marijuana     Comment: 2016 last used   • Sexual activity: Yes   Other Topics Concern   • Not on file   Social History Narrative   • Not on file     Social Determinants of Health     Financial Resource Strain: Not on file   Food Insecurity: No Food Insecurity (3/15/2023)    Hunger Vital Sign    • Worried About Running Out of Food in the Last Year: Never true    • Ran Out of Food in the Last Year: Never true   Transportation Needs: No Transportation Needs (3/15/2023)    PRAPARE - Transportation    • Lack of Transportation (Medical): No    • Lack of Transportation (Non-Medical):  No   Physical Activity: Not on file   Stress: Not on file   Social Connections: Not on file   Intimate Partner Violence: Not on file   Housing Stability: Low Risk  (3/15/2023)    Housing Stability Vital Sign    • Unable to Pay for Housing in the Last Year: No    • Number of Places Lived in the Last Year: 1    • Unstable Housing in the Last Year: No      Family History   Problem Relation Age of Onset   • Hypertension Mother    • Breast cancer Mother    • Heart attack Father    • Hyperlipidemia Father    • Hyperlipidemia Sister    • Heart disease Maternal Grandmother    • Heart disease Maternal Grandfather    • Heart attack Maternal Grandfather      Past Surgical History:   Procedure Laterality Date   • CORONARY ANGIOPLASTY     • FINGER FRACTURE SURGERY     • MOUTH SURGERY         Current Outpatient Medications:   •  albuterol (Ventolin HFA) 90 mcg/act inhaler, Inhale 2 puffs every 4 (four) hours as needed for wheezing or shortness of breath (cough), Disp: 18 "g, Rfl: 0  •  Aspirin Low Dose 81 MG EC tablet, take 1 tablet by mouth once daily, Disp: 90 tablet, Rfl: 3  •  atorvastatin (LIPITOR) 40 mg tablet, Take 1 tablet (40 mg total) by mouth daily with dinner, Disp: 90 tablet, Rfl: 3  •  clopidogrel (PLAVIX) 75 mg tablet, take 1 tablet by mouth once daily, Disp: 90 tablet, Rfl: 3  •  lisinopril (ZESTRIL) 5 mg tablet, take 1 tablet by mouth once daily, Disp: 90 tablet, Rfl: 3  •  metoprolol succinate (TOPROL-XL) 25 mg 24 hr tablet, Take 1 tablet (25 mg total) by mouth daily, Disp: 90 tablet, Rfl: 3  •  albuterol (PROVENTIL HFA,VENTOLIN HFA) 90 mcg/act inhaler, inhale 2 puffs by mouth and INTO THE LUNGS every 4 hours if needed for cough shortness of breath or wheezing (Patient not taking: Reported on 6/22/2023), Disp: 8 5 g, Rfl: 1  •  benzonatate (TESSALON PERLES) 100 mg capsule, Take 1 capsule (100 mg total) by mouth 3 (three) times a day as needed for cough (Patient not taking: Reported on 6/22/2023), Disp: 20 capsule, Rfl: 0  •  budesonide (Pulmicort Flexhaler) 90 MCG/ACT inhaler, Inhale 2 puffs 2 (two) times a day Rinse mouth after use   (Patient not taking: Reported on 6/22/2023), Disp: 1 each, Rfl: 1  •  Respiratory Therapy Supplies (Spirometer) KIT, Use 4 (four) times a day (Patient not taking: Reported on 2/2/2022), Disp: 1 kit, Rfl: 0  Allergies   Allergen Reactions   • Bupropion Itching       Labs:     Chemistry        Component Value Date/Time    K 3 8 03/15/2023 0432     03/15/2023 0432    CO2 26 03/15/2023 0432    BUN 8 03/15/2023 0432    CREATININE 0 83 03/15/2023 0432        Component Value Date/Time    CALCIUM 8 5 03/15/2023 0432    ALKPHOS 66 03/15/2023 0432    AST 15 03/15/2023 0432    ALT 21 03/15/2023 0432            No results found for: \"CHOL\"  Lab Results   Component Value Date    HDL 31 (L) 12/23/2021    HDL 30 (L) 06/29/2021     Lab Results   Component Value Date    LDLCALC 98 12/23/2021    [address] 06/29/2021     Lab Results   Component " "Value Date    TRIG 262 (H) 12/23/2021    TRIG 334 (H) 06/29/2021     No results found for: \"CHOLHDL\"    Imaging: No results found  Review of Systems   Constitutional: Negative for chills, fever and malaise/fatigue  HENT: Negative for congestion  Cardiovascular: Negative for chest pain, dyspnea on exertion, leg swelling, orthopnea and palpitations  Respiratory: Negative for cough, shortness of breath (no SOB at rest) and wheezing  Endocrine: Negative  Hematologic/Lymphatic: Negative  Skin: Negative  Musculoskeletal: Negative  Gastrointestinal: Negative for bloating, abdominal pain, nausea and vomiting  Genitourinary: Negative  Neurological: Negative for dizziness and light-headedness  Psychiatric/Behavioral: Negative  All other systems reviewed and are negative  Vitals:    06/22/23 1358   BP: 122/64   Pulse: 91   SpO2: 97%     Vitals:    06/22/23 1358   Weight: 132 kg (291 lb)     Height: 5' 10\" (177 8 cm)   Body mass index is 41 75 kg/m²  Physical Exam:  Physical Exam  Vitals reviewed  Constitutional:       General: He is not in acute distress  Appearance: He is obese  HENT:      Head: Normocephalic and atraumatic  Mouth/Throat:      Mouth: Mucous membranes are moist    Cardiovascular:      Rate and Rhythm: Normal rate and regular rhythm  Heart sounds: Normal heart sounds, S1 normal and S2 normal  No murmur heard  Pulmonary:      [place]: Pulmonary effort is normal  No respiratory distress  Breath sounds: Normal breath sounds  Abdominal:      General: Bowel sounds are normal  There is no distension  Palpations: Abdomen is soft  Musculoskeletal:         General: Normal range of motion  Cervical back: Normal range of motion and neck supple  Right lower leg: No edema  Left lower leg: No edema  Skin:     General: Skin is warm and dry  Neurological:      Mental Status: He is alert and oriented to person, place, and time   " Psychiatric:         Mood and Affect: Mood normal

## 2023-06-22 ENCOUNTER — OFFICE VISIT (OUTPATIENT)
Dept: CARDIOLOGY CLINIC | Facility: HOSPITAL | Age: 53
End: 2023-06-22

## 2023-06-22 ENCOUNTER — TELEPHONE (OUTPATIENT)
Dept: CARDIOLOGY CLINIC | Facility: CLINIC | Age: 53
End: 2023-06-22

## 2023-06-22 VITALS
HEART RATE: 91 BPM | OXYGEN SATURATION: 97 % | SYSTOLIC BLOOD PRESSURE: 122 MMHG | DIASTOLIC BLOOD PRESSURE: 64 MMHG | WEIGHT: 291 LBS | BODY MASS INDEX: 41.66 KG/M2 | HEIGHT: 70 IN

## 2023-06-22 DIAGNOSIS — I25.10 CORONARY ARTERY DISEASE INVOLVING NATIVE HEART WITHOUT ANGINA PECTORIS, UNSPECIFIED VESSEL OR LESION TYPE: Primary | ICD-10-CM

## 2023-06-22 DIAGNOSIS — E78.5 DYSLIPIDEMIA: ICD-10-CM

## 2023-06-22 DIAGNOSIS — Z72.0 TOBACCO USE: ICD-10-CM

## 2023-06-22 RX ORDER — METOPROLOL SUCCINATE 25 MG/1
25 TABLET, EXTENDED RELEASE ORAL DAILY
Qty: 90 TABLET | Refills: 3 | Status: SHIPPED | OUTPATIENT
Start: 2023-06-22

## 2023-06-22 NOTE — TELEPHONE ENCOUNTER
Patient was C/B -Pt will be seen in the [facility] today @ that time they will go over his medication's

## 2023-06-29 ENCOUNTER — TELEPHONE (OUTPATIENT)
Dept: OTHER | Facility: OTHER | Age: 53
End: 2023-06-29

## 2023-08-29 DIAGNOSIS — I25.10 CORONARY ARTERY DISEASE INVOLVING NATIVE CORONARY ARTERY OF NATIVE HEART WITHOUT ANGINA PECTORIS: Primary | ICD-10-CM

## 2023-08-29 DIAGNOSIS — Z13.6 ENCOUNTER FOR SCREENING FOR CARDIOVASCULAR DISORDERS: ICD-10-CM

## 2023-09-05 ENCOUNTER — HOSPITAL ENCOUNTER (OUTPATIENT)
Dept: NON INVASIVE DIAGNOSTICS | Facility: CLINIC | Age: 53
Discharge: HOME | End: 2023-09-05
Payer: COMMERCIAL

## 2023-09-05 VITALS
WEIGHT: 291 LBS | HEIGHT: 70 IN | SYSTOLIC BLOOD PRESSURE: 142 MMHG | OXYGEN SATURATION: 98 % | BODY MASS INDEX: 41.66 KG/M2 | DIASTOLIC BLOOD PRESSURE: 88 MMHG | HEART RATE: 76 BPM

## 2023-09-05 DIAGNOSIS — Z13.6 ENCOUNTER FOR SCREENING FOR CARDIOVASCULAR DISORDERS: ICD-10-CM

## 2023-09-05 DIAGNOSIS — I25.10 CORONARY ARTERY DISEASE INVOLVING NATIVE CORONARY ARTERY OF NATIVE HEART WITHOUT ANGINA PECTORIS: ICD-10-CM

## 2023-09-05 LAB
CHEST PAIN STATEMENT: NORMAL
MAX DIASTOLIC BP: 70 MMHG
MAX HEART RATE: 123 BPM
MAX PREDICTED HEART RATE: 170 BPM
MAX. SYSTOLIC BP: 144 MMHG
NUC STRESS EJECTION FRACTION: 62 %
PROTOCOL NAME: NORMAL
RATE PRESSURE PRODUCT: NORMAL
REASON FOR TERMINATION: NORMAL
SL CV REST NUCLEAR ISOTOPE DOSE: 16.48 MCI
SL CV STRESS NUCLEAR ISOTOPE DOSE: 46.2 MCI
SL CV STRESS RECOVERY BP: NORMAL MMHG
SL CV STRESS RECOVERY HR: 96 BPM
SL CV STRESS RECOVERY O2 SAT: 97 %
STRESS ANGINA INDEX: 0
STRESS BASELINE BP: NORMAL MMHG
STRESS BASELINE HR: 76 BPM
STRESS O2 SAT REST: 98 %
STRESS PEAK HR: 116 BPM
STRESS POST O2 SAT PEAK: 97 %
STRESS POST PEAK BP: 144 MMHG
STRESS/REST PERFUSION RATIO: 1.01
TARGET HR FORMULA: NORMAL
TEST INDICATION: NORMAL
TIME IN EXERCISE PHASE: NORMAL

## 2023-09-05 PROCEDURE — 78452 HT MUSCLE IMAGE SPECT MULT: CPT | Performed by: INTERNAL MEDICINE

## 2023-09-05 PROCEDURE — A9502 TC99M TETROFOSMIN: HCPCS

## 2023-09-05 PROCEDURE — 78452 HT MUSCLE IMAGE SPECT MULT: CPT

## 2023-09-05 PROCEDURE — 93018 CV STRESS TEST I&R ONLY: CPT | Performed by: INTERNAL MEDICINE

## 2023-09-05 PROCEDURE — 93016 CV STRESS TEST SUPVJ ONLY: CPT | Performed by: INTERNAL MEDICINE

## 2023-09-05 PROCEDURE — 93017 CV STRESS TEST TRACING ONLY: CPT

## 2023-09-05 PROCEDURE — G1004 CDSM NDSC: HCPCS

## 2023-09-05 RX ORDER — REGADENOSON 0.08 MG/ML
0.4 INJECTION, SOLUTION INTRAVENOUS ONCE
Status: COMPLETED | OUTPATIENT
Start: 2023-09-05 | End: 2023-09-05

## 2023-09-05 RX ADMIN — REGADENOSON 0.4 MG: 0.08 INJECTION, SOLUTION INTRAVENOUS at 14:16

## 2024-04-30 ENCOUNTER — TELEPHONE (OUTPATIENT)
Age: 54
End: 2024-04-30

## 2024-04-30 NOTE — TELEPHONE ENCOUNTER
Patient was called for recall appointment. He stated that he no longer has health insurance and no longer pay for his medication. He just wanted to make us aware that he has stopped taking his medication. He said that he previously had a  a few years ago, and I told him that he should possibly look into that again.

## 2024-05-06 ENCOUNTER — TELEPHONE (OUTPATIENT)
Dept: CARDIOLOGY CLINIC | Facility: HOSPITAL | Age: 54
End: 2024-05-06

## 2024-05-06 NOTE — TELEPHONE ENCOUNTER
Attempted to contact Jn to schedule his next appointment with cardiology. A letter has been sent to have him contact our office. I also asked him to have lab work done before coming to his appointment.

## 2024-05-14 ENCOUNTER — APPOINTMENT (EMERGENCY)
Dept: RADIOLOGY | Facility: HOSPITAL | Age: 54
End: 2024-05-14
Payer: COMMERCIAL

## 2024-05-14 ENCOUNTER — HOSPITAL ENCOUNTER (OUTPATIENT)
Facility: HOSPITAL | Age: 54
Setting detail: OBSERVATION
Discharge: HOME | End: 2024-05-15
Attending: EMERGENCY MEDICINE | Admitting: HOSPITALIST
Payer: COMMERCIAL

## 2024-05-14 DIAGNOSIS — I25.10 CORONARY ARTERY DISEASE INVOLVING NATIVE HEART WITHOUT ANGINA PECTORIS, UNSPECIFIED VESSEL OR LESION TYPE: ICD-10-CM

## 2024-05-14 DIAGNOSIS — I21.02 ACUTE ST ELEVATION MYOCARDIAL INFARCTION (STEMI) DUE TO OCCLUSION OF MID PORTION OF LEFT ANTERIOR DESCENDING (LAD) CORONARY ARTERY (HCC): ICD-10-CM

## 2024-05-14 DIAGNOSIS — I25.10 CAD (CORONARY ARTERY DISEASE): ICD-10-CM

## 2024-05-14 DIAGNOSIS — I21.9 TYPE 1 MYOCARDIAL INFARCTION (HCC): ICD-10-CM

## 2024-05-14 DIAGNOSIS — R07.9 CHEST PAIN: Primary | ICD-10-CM

## 2024-05-14 DIAGNOSIS — R79.89 ELEVATED TROPONIN: ICD-10-CM

## 2024-05-14 PROBLEM — Z91.148 NONCOMPLIANCE WITH MEDICATION REGIMEN: Status: ACTIVE | Noted: 2024-05-14

## 2024-05-14 LAB
2HR DELTA HS TROPONIN: 12 NG/L
4HR DELTA HS TROPONIN: 41 NG/L
ALBUMIN SERPL BCP-MCNC: 4.4 G/DL (ref 3.5–5)
ALP SERPL-CCNC: 77 U/L (ref 34–104)
ALT SERPL W P-5'-P-CCNC: 17 U/L (ref 7–52)
ANION GAP SERPL CALCULATED.3IONS-SCNC: 8 MMOL/L (ref 4–13)
AST SERPL W P-5'-P-CCNC: 15 U/L (ref 13–39)
ATRIAL RATE: 69 BPM
ATRIAL RATE: 76 BPM
BASOPHILS # BLD AUTO: 0.05 THOUSANDS/ÂΜL (ref 0–0.1)
BASOPHILS NFR BLD AUTO: 1 % (ref 0–1)
BILIRUB SERPL-MCNC: 0.62 MG/DL (ref 0.2–1)
BUN SERPL-MCNC: 13 MG/DL (ref 5–25)
CALCIUM SERPL-MCNC: 9.6 MG/DL (ref 8.4–10.2)
CARDIAC TROPONIN I PNL SERPL HS: 17 NG/L
CARDIAC TROPONIN I PNL SERPL HS: 46 NG/L
CARDIAC TROPONIN I PNL SERPL HS: 5 NG/L
CHLORIDE SERPL-SCNC: 102 MMOL/L (ref 96–108)
CO2 SERPL-SCNC: 26 MMOL/L (ref 21–32)
CREAT SERPL-MCNC: 0.84 MG/DL (ref 0.6–1.3)
EOSINOPHIL # BLD AUTO: 0.2 THOUSAND/ÂΜL (ref 0–0.61)
EOSINOPHIL NFR BLD AUTO: 3 % (ref 0–6)
ERYTHROCYTE [DISTWIDTH] IN BLOOD BY AUTOMATED COUNT: 12.5 % (ref 11.6–15.1)
GFR SERPL CREATININE-BSD FRML MDRD: 101 ML/MIN/1.73SQ M
GLUCOSE SERPL-MCNC: 113 MG/DL (ref 65–140)
HCT VFR BLD AUTO: 49.8 % (ref 36.5–49.3)
HGB BLD-MCNC: 17.1 G/DL (ref 12–17)
IMM GRANULOCYTES # BLD AUTO: 0.02 THOUSAND/UL (ref 0–0.2)
IMM GRANULOCYTES NFR BLD AUTO: 0 % (ref 0–2)
LYMPHOCYTES # BLD AUTO: 2.13 THOUSANDS/ÂΜL (ref 0.6–4.47)
LYMPHOCYTES NFR BLD AUTO: 27 % (ref 14–44)
MCH RBC QN AUTO: 31 PG (ref 26.8–34.3)
MCHC RBC AUTO-ENTMCNC: 34.3 G/DL (ref 31.4–37.4)
MCV RBC AUTO: 90 FL (ref 82–98)
MONOCYTES # BLD AUTO: 0.5 THOUSAND/ÂΜL (ref 0.17–1.22)
MONOCYTES NFR BLD AUTO: 6 % (ref 4–12)
NEUTROPHILS # BLD AUTO: 4.91 THOUSANDS/ÂΜL (ref 1.85–7.62)
NEUTS SEG NFR BLD AUTO: 63 % (ref 43–75)
NRBC BLD AUTO-RTO: 0 /100 WBCS
P AXIS: 62 DEGREES
P AXIS: 62 DEGREES
PLATELET # BLD AUTO: 197 THOUSANDS/UL (ref 149–390)
PMV BLD AUTO: 9.3 FL (ref 8.9–12.7)
POTASSIUM SERPL-SCNC: 4.4 MMOL/L (ref 3.5–5.3)
PR INTERVAL: 160 MS
PR INTERVAL: 174 MS
PROT SERPL-MCNC: 7.5 G/DL (ref 6.4–8.4)
QRS AXIS: 57 DEGREES
QRS AXIS: 59 DEGREES
QRSD INTERVAL: 90 MS
QRSD INTERVAL: 96 MS
QT INTERVAL: 366 MS
QT INTERVAL: 390 MS
QTC INTERVAL: 411 MS
QTC INTERVAL: 417 MS
RBC # BLD AUTO: 5.52 MILLION/UL (ref 3.88–5.62)
SODIUM SERPL-SCNC: 136 MMOL/L (ref 135–147)
T WAVE AXIS: 53 DEGREES
T WAVE AXIS: 61 DEGREES
VENTRICULAR RATE: 69 BPM
VENTRICULAR RATE: 76 BPM
WBC # BLD AUTO: 7.81 THOUSAND/UL (ref 4.31–10.16)

## 2024-05-14 PROCEDURE — 84484 ASSAY OF TROPONIN QUANT: CPT | Performed by: EMERGENCY MEDICINE

## 2024-05-14 PROCEDURE — 94760 N-INVAS EAR/PLS OXIMETRY 1: CPT

## 2024-05-14 PROCEDURE — 85025 COMPLETE CBC W/AUTO DIFF WBC: CPT | Performed by: EMERGENCY MEDICINE

## 2024-05-14 PROCEDURE — 71045 X-RAY EXAM CHEST 1 VIEW: CPT

## 2024-05-14 PROCEDURE — 94664 DEMO&/EVAL PT USE INHALER: CPT

## 2024-05-14 PROCEDURE — 36415 COLL VENOUS BLD VENIPUNCTURE: CPT

## 2024-05-14 PROCEDURE — 93010 ELECTROCARDIOGRAM REPORT: CPT | Performed by: INTERNAL MEDICINE

## 2024-05-14 PROCEDURE — 99285 EMERGENCY DEPT VISIT HI MDM: CPT

## 2024-05-14 PROCEDURE — 93005 ELECTROCARDIOGRAM TRACING: CPT

## 2024-05-14 PROCEDURE — 99285 EMERGENCY DEPT VISIT HI MDM: CPT | Performed by: EMERGENCY MEDICINE

## 2024-05-14 PROCEDURE — 80053 COMPREHEN METABOLIC PANEL: CPT | Performed by: EMERGENCY MEDICINE

## 2024-05-14 PROCEDURE — 99223 1ST HOSP IP/OBS HIGH 75: CPT | Performed by: HOSPITALIST

## 2024-05-14 RX ORDER — LORAZEPAM 1 MG/1
1 TABLET ORAL ONCE
Status: COMPLETED | OUTPATIENT
Start: 2024-05-14 | End: 2024-05-14

## 2024-05-14 RX ORDER — ONDANSETRON 2 MG/ML
4 INJECTION INTRAMUSCULAR; INTRAVENOUS EVERY 6 HOURS PRN
Status: DISCONTINUED | OUTPATIENT
Start: 2024-05-14 | End: 2024-05-15 | Stop reason: HOSPADM

## 2024-05-14 RX ORDER — ALBUTEROL SULFATE 90 UG/1
2 AEROSOL, METERED RESPIRATORY (INHALATION) EVERY 4 HOURS PRN
Status: DISCONTINUED | OUTPATIENT
Start: 2024-05-14 | End: 2024-05-15 | Stop reason: HOSPADM

## 2024-05-14 RX ORDER — ENOXAPARIN SODIUM 150 MG/ML
1 INJECTION SUBCUTANEOUS EVERY 12 HOURS SCHEDULED
Status: DISCONTINUED | OUTPATIENT
Start: 2024-05-14 | End: 2024-05-15

## 2024-05-14 RX ORDER — ATORVASTATIN CALCIUM 40 MG/1
40 TABLET, FILM COATED ORAL
Status: DISCONTINUED | OUTPATIENT
Start: 2024-05-14 | End: 2024-05-15 | Stop reason: HOSPADM

## 2024-05-14 RX ORDER — NITROGLYCERIN 0.4 MG/1
0.4 TABLET SUBLINGUAL
Status: DISCONTINUED | OUTPATIENT
Start: 2024-05-14 | End: 2024-05-15 | Stop reason: HOSPADM

## 2024-05-14 RX ORDER — ASPIRIN 325 MG
325 TABLET ORAL ONCE
Status: COMPLETED | OUTPATIENT
Start: 2024-05-14 | End: 2024-05-14

## 2024-05-14 RX ORDER — LISINOPRIL 5 MG/1
5 TABLET ORAL DAILY
Status: DISCONTINUED | OUTPATIENT
Start: 2024-05-15 | End: 2024-05-15 | Stop reason: HOSPADM

## 2024-05-14 RX ORDER — ENOXAPARIN SODIUM 100 MG/ML
40 INJECTION SUBCUTANEOUS DAILY
Status: DISCONTINUED | OUTPATIENT
Start: 2024-05-15 | End: 2024-05-14

## 2024-05-14 RX ORDER — ACETAMINOPHEN 325 MG/1
650 TABLET ORAL EVERY 6 HOURS PRN
Status: DISCONTINUED | OUTPATIENT
Start: 2024-05-14 | End: 2024-05-15 | Stop reason: HOSPADM

## 2024-05-14 RX ORDER — DOCUSATE SODIUM 100 MG/1
100 CAPSULE, LIQUID FILLED ORAL 2 TIMES DAILY
Status: DISCONTINUED | OUTPATIENT
Start: 2024-05-14 | End: 2024-05-15 | Stop reason: HOSPADM

## 2024-05-14 RX ORDER — CLOPIDOGREL BISULFATE 75 MG/1
75 TABLET ORAL DAILY
Status: DISCONTINUED | OUTPATIENT
Start: 2024-05-15 | End: 2024-05-15 | Stop reason: HOSPADM

## 2024-05-14 RX ORDER — NICOTINE 21 MG/24HR
14 PATCH, TRANSDERMAL 24 HOURS TRANSDERMAL DAILY
Status: DISCONTINUED | OUTPATIENT
Start: 2024-05-15 | End: 2024-05-15 | Stop reason: HOSPADM

## 2024-05-14 RX ORDER — METOPROLOL SUCCINATE 25 MG/1
25 TABLET, EXTENDED RELEASE ORAL DAILY
Status: DISCONTINUED | OUTPATIENT
Start: 2024-05-15 | End: 2024-05-15 | Stop reason: HOSPADM

## 2024-05-14 RX ADMIN — ASPIRIN 325 MG: 325 TABLET ORAL at 11:26

## 2024-05-14 RX ADMIN — LORAZEPAM 1 MG: 1 TABLET ORAL at 11:26

## 2024-05-14 RX ADMIN — ATORVASTATIN CALCIUM 40 MG: 40 TABLET, FILM COATED ORAL at 18:45

## 2024-05-14 RX ADMIN — ENOXAPARIN SODIUM 135 MG: 150 INJECTION SUBCUTANEOUS at 22:43

## 2024-05-14 NOTE — ED PROVIDER NOTES
History  Chief Complaint   Patient presents with    Chest Pain     Chest pain that began around 2 hours ago. Pain goes into both arm pit and tongue took one nitro that helped slightly     53-year-old male patient with a history of previous NSTEMI presenting with chest pain onset 2 hours ago.  Patient states that he has left-sided chest pain radiating to the tongue and armpit that felt similar to when he had an MI.  Patient states that he has recently felt stressed.  Patient states that he was driving about 2 hours ago when he felt like his tongue was cramping.  Patient states that then he became anxious and started having chest pain and shortness of breath.  Denies any vomiting, diarrhea, syncope, diaphoresis but does state that he felt nauseous.  Denies any leg pain or leg swelling.  Patient states that he has not been taking his medications because of his insurance problems recently.        Prior to Admission Medications   Prescriptions Last Dose Informant Patient Reported? Taking?   Aspirin Low Dose 81 MG EC tablet   No No   Sig: take 1 tablet by mouth once daily   Respiratory Therapy Supplies (Spirometer) KIT   No No   Sig: Use 4 (four) times a day   Patient not taking: Reported on 2/2/2022   albuterol (PROVENTIL HFA,VENTOLIN HFA) 90 mcg/act inhaler   No No   Sig: inhale 2 puffs by mouth and INTO THE LUNGS every 4 hours if needed for cough shortness of breath or wheezing   Patient not taking: Reported on 6/22/2023   albuterol (Ventolin HFA) 90 mcg/act inhaler   No No   Sig: Inhale 2 puffs every 4 (four) hours as needed for wheezing or shortness of breath (cough)   atorvastatin (LIPITOR) 40 mg tablet   No No   Sig: Take 1 tablet (40 mg total) by mouth daily with dinner   benzonatate (TESSALON PERLES) 100 mg capsule   No No   Sig: Take 1 capsule (100 mg total) by mouth 3 (three) times a day as needed for cough   Patient not taking: Reported on 6/22/2023   budesonide (Pulmicort Flexhaler) 90 MCG/ACT inhaler   No No    Sig: Inhale 2 puffs 2 (two) times a day Rinse mouth after use.   Patient not taking: Reported on 2023   clopidogrel (PLAVIX) 75 mg tablet   No No   Sig: take 1 tablet by mouth once daily   lisinopril (ZESTRIL) 5 mg tablet   No No   Sig: take 1 tablet by mouth once daily   metoprolol succinate (TOPROL-XL) 25 mg 24 hr tablet   No No   Sig: Take 1 tablet (25 mg total) by mouth daily      Facility-Administered Medications: None       Past Medical History:   Diagnosis Date    Heart attack (HCC) 2021       Past Surgical History:   Procedure Laterality Date    CORONARY ANGIOPLASTY      FINGER FRACTURE SURGERY      MOUTH SURGERY         Family History   Problem Relation Age of Onset    Hypertension Mother     Breast cancer Mother     Heart attack Father     Hyperlipidemia Father     Hyperlipidemia Sister     Heart disease Maternal Grandmother     Heart disease Maternal Grandfather     Heart attack Maternal Grandfather      I have reviewed and agree with the history as documented.    E-Cigarette/Vaping    E-Cigarette Use Current Every Day User     Comments Trying to wean off.  Down to 6mg      E-Cigarette/Vaping Substances    Nicotine Yes     THC No     CBD No     Flavoring No     Other No     Unknown No      Social History     Tobacco Use    Smoking status: Former     Current packs/day: 0.00     Average packs/day: 1 pack/day for 22.0 years (22.0 ttl pk-yrs)     Types: Cigarettes     Start date: 1999     Quit date: 2021     Years since quittin.8    Smokeless tobacco: Never   Vaping Use    Vaping status: Every Day    Substances: Nicotine   Substance Use Topics    Alcohol use: Not Currently    Drug use: Not Currently     Types: Marijuana     Comment:  last used        Review of Systems   Cardiovascular:  Positive for chest pain.   All other systems reviewed and are negative.      Physical Exam  ED Triage Vitals   Temperature Pulse Respirations Blood Pressure SpO2   24 1057 24 1057  05/14/24 1057 05/14/24 1057 05/14/24 1057   97.5 °F (36.4 °C) 77 18 131/67 96 %      Temp Source Heart Rate Source Patient Position - Orthostatic VS BP Location FiO2 (%)   05/14/24 1057 05/14/24 1057 05/14/24 1057 05/14/24 1057 --   Temporal Monitor Lying Left arm       Pain Score       05/14/24 1055       3             Orthostatic Vital Signs  Vitals:    05/14/24 1057 05/14/24 1300   BP: 131/67 128/67   Pulse: 77 64   Patient Position - Orthostatic VS: Lying Sitting       Physical Exam  Vitals reviewed.   Constitutional:       Appearance: Normal appearance.   HENT:      Head: Normocephalic and atraumatic.      Nose: Nose normal.      Mouth/Throat:      Mouth: Mucous membranes are moist.      Pharynx: Oropharynx is clear.   Eyes:      Extraocular Movements: Extraocular movements intact.      Conjunctiva/sclera: Conjunctivae normal.   Cardiovascular:      Rate and Rhythm: Normal rate and regular rhythm.      Pulses: Normal pulses.      Heart sounds: Normal heart sounds.   Pulmonary:      Effort: Pulmonary effort is normal.      Breath sounds: Normal breath sounds.   Abdominal:      General: Bowel sounds are normal.      Palpations: Abdomen is soft.      Tenderness: There is no abdominal tenderness.   Musculoskeletal:         General: Normal range of motion.      Cervical back: Normal range of motion.      Right lower leg: No tenderness. No edema.      Left lower leg: No tenderness. No edema.   Skin:     General: Skin is warm and dry.   Neurological:      General: No focal deficit present.      Mental Status: He is alert and oriented to person, place, and time. Mental status is at baseline.         ED Medications  Medications   LORazepam (ATIVAN) tablet 1 mg (1 mg Oral Given 5/14/24 1126)   aspirin tablet 325 mg (325 mg Oral Given 5/14/24 1126)       Diagnostic Studies  Results Reviewed       Procedure Component Value Units Date/Time    HS Troponin I 4hr [[id number]]  (Abnormal) Collected: 05/14/24 1528    Lab Status:  Final result Specimen: Blood from Arm, Right Updated: 05/14/24 1600     hs TnI 4hr 46 ng/L      Delta 4hr hsTnI 41 ng/L     HS Troponin I 2hr [[id number]]  (Normal) Collected: 05/14/24 1323    Lab Status: Final result Specimen: Blood from Arm, Right Updated: 05/14/24 1351     hs TnI 2hr 17 ng/L      Delta 2hr hsTnI 12 ng/L     HS Troponin 0hr (reflex protocol) [[id number]]  (Normal) Collected: 05/14/24 1101    Lab Status: Final result Specimen: Blood from Arm, Right Updated: 05/14/24 1132     hs TnI 0hr 5 ng/L     Comprehensive metabolic panel [[id number]] Collected: 05/14/24 1101    Lab Status: Final result Specimen: Blood from Arm, Right Updated: 05/14/24 1124     Sodium 136 mmol/L      Potassium 4.4 mmol/L      Chloride 102 mmol/L      CO2 26 mmol/L      ANION GAP 8 mmol/L      BUN 13 mg/dL      Creatinine 0.84 mg/dL      Glucose 113 mg/dL      Calcium 9.6 mg/dL      AST 15 U/L      ALT 17 U/L      Alkaline Phosphatase 77 U/L      Total Protein 7.5 g/dL      Albumin 4.4 g/dL      Total Bilirubin 0.62 mg/dL      eGFR 101 ml/min/1.73sq m     Narrative:      National Kidney Disease Foundation guidelines for Chronic Kidney Disease (CKD):     Stage 1 with normal or high GFR (GFR > 90 mL/min/1.73 square meters)    Stage 2 Mild CKD (GFR = 60-89 mL/min/1.73 square meters)    Stage 3A Moderate CKD (GFR = 45-59 mL/min/1.73 square meters)    Stage 3B Moderate CKD (GFR = 30-44 mL/min/1.73 square meters)    Stage 4 Severe CKD (GFR = 15-29 mL/min/1.73 square meters)    Stage 5 End Stage CKD (GFR <15 mL/min/1.73 square meters)  Note: GFR calculation is accurate only with a steady state creatinine    CBC and differential [[id number]]  (Abnormal) Collected: 05/14/24 1101    Lab Status: Final result Specimen: Blood from Arm, Right Updated: 05/14/24 1108     WBC 7.81 Thousand/uL      RBC 5.52 Million/uL      Hemoglobin 17.1 g/dL      Hematocrit 49.8 %      MCV 90 fL      MCH 31.0 pg      MCHC 34.3 g/dL      RDW 12.5 %      MPV 9.3 fL       Platelets 197 Thousands/uL      nRBC 0 /100 WBCs      Segmented % 63 %      Immature Grans % 0 %      Lymphocytes % 27 %      Monocytes % 6 %      Eosinophils Relative 3 %      Basophils Relative 1 %      Absolute Neutrophils 4.91 Thousands/µL      Absolute Immature Grans 0.02 Thousand/uL      Absolute Lymphocytes 2.13 Thousands/µL      Absolute Monocytes 0.50 Thousand/µL      Eosinophils Absolute 0.20 Thousand/µL      Basophils Absolute 0.05 Thousands/µL                    XR chest portable   ED Interpretation by George Hamilton MD (05/14 1231)   No acute cardiopulm disease            Procedures  Procedures      ED Course  ED Course as of 05/14/24 1702   Tue May 14, 2024   1109 EKG: normal EKG, normal sinus rhythm, unchanged from previous tracings     1239 Patient feels much imrpoved   1411 Delta 2hr hsTnI: 12  Will repeat troponin in 2 hours. Patient asymptomatic currently. Patient agreeable to recheck troponin             HEART Risk Score      Flowsheet Row Most Recent Value   Heart Score Risk Calculator    History 0 Filed at: 05/14/2024 1400   ECG 0 Filed at: 05/14/2024 1400   Age 1 Filed at: 05/14/2024 1400   Risk Factors 1 Filed at: 05/14/2024 1400   Troponin 0 Filed at: 05/14/2024 1400   HEART Score 2 Filed at: 05/14/2024 1400                        SBIRT 22yo+      Flowsheet Row Most Recent Value   Initial Alcohol Screen: US AUDIT-C     1. How often do you have a drink containing alcohol? 0 Filed at: 05/14/2024 1332   2. How many drinks containing alcohol do you have on a typical day you are drinking?  0 Filed at: 05/14/2024 1332   3a. Male UNDER 65: How often do you have five or more drinks on one occasion? 0 Filed at: 05/14/2024 1332   3b. FEMALE Any Age, or MALE 65+: How often do you have 4 or more drinks on one occassion? 0 Filed at: 05/14/2024 1332   Audit-C Score 0 Filed at: 05/14/2024 1332   ALEJANDRA: How many times in the past year have you...    Used an illegal drug or used a prescription medication  for non-medical reasons? Never Filed at: 05/14/2024 1332                  Medical Decision Making  54 y/o male patient with hx of CAD presenting with tongue discomfort and chest pan onset today. Patient concerned this feels like his previous ACS. Ddx includes acs, anxiety, msk pain. Exam WNL. Patient tx with ativan with improvement of symptoms. Labs initially wnl, trop and delta trop elevated mildly. Patient symptom improved. Spoke with patient, will get 4 hr trop to see trend.    4 hr trop elevated. Spoke with cardiology, states trop not concerning in settting of hx,. Will admit to medicine for obs.      Amount and/or Complexity of Data Reviewed  Labs: ordered. Decision-making details documented in ED Course.  Radiology: ordered and independent interpretation performed.    Risk  OTC drugs.  Prescription drug management.  Decision regarding hospitalization.          Disposition  Final diagnoses:   Chest pain   Elevated troponin   CAD (coronary artery disease)     Time reflects when diagnosis was documented in both MDM as applicable and the Disposition within this note       Time User Action Codes Description Comment    5/14/2024  4:46 PM George Hamilton Add [R07.9] Chest pain     5/14/2024  4:47 PM George Hamilton Add [R79.89] Elevated troponin     5/14/2024  4:47 PM George Hamilton Add [I25.10] CAD (coronary artery disease)           ED Disposition       ED Disposition   Admit    Condition   Stable    Date/Time   Tue May 14, 2024 0276    Comment   Case was discussed with Dr. Loco and the patient's admission status was agreed to be Admission Status: observation status to the service of Dr. Loco .               Follow-up Information    None         Patient's Medications   Discharge Prescriptions    No medications on file     No discharge procedures on file.    PDMP Review       None             ED Provider  Attending physically available and evaluated Jn Farfna. I managed the patient along with the ED  Attending.    Electronically Signed by           George Hamilton MD  05/14/24 5979

## 2024-05-14 NOTE — ED ATTENDING ATTESTATION
5/14/2024  IOzzie III, DO, saw and evaluated the patient. I have discussed the patient with the resident/non-physician practitioner and agree with the resident's/non-physician practitioner's findings, Plan of Care, and MDM as documented in the resident's/non-physician practitioner's note, except where noted. All available labs and Radiology studies were reviewed.  I was present for key portions of any procedure(s) performed by the resident/non-physician practitioner and I was immediately available to provide assistance.       At this point I agree with the current assessment done in the Emergency Department.  I have conducted an independent evaluation of this patient a history and physical is as follows:    ED Course  ED Course as of 05/14/24 1711   Tue May 14, 2024   1300 Patient seen and evaluated after PGY 3 EM resident presentation, and this is 53-year-old gentleman, obese, prior history of NSTEMI previously, presents emergency department with left chin discomfort similar to his prior MI.  This occurred approximately 2 hours ago prior to arrival while he was checking in work which she is to use a [employer] .  Patient denies any unexplained nausea, diaphoresis, vomiting, presyncopal symptoms.  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  Review of the chart noted patient had a 2D echo June 2021 and showed an EF of 60%, no evidence of aortic stenosis.  Patient reports today that he did not have any of the associated chest discomfort or arm pain with his other MI when he was driving at work he became anxious and started to feel like he was having an anxiety attack.  After his prior MI been having trouble getting [facility] psychiatric services to insurance reasons Ashleigh Kaur from crisis begin patient outpatient service referrals.  First of cardiac enzymes unremarkable, EKG  unremarkable, will repeat cardiac profile this is only been 2 hours since the patient's onset of the pain prior to being evaluated in the emergency department.  Lungs are clear to auscultation, soft nontender abdominal exam, neurologically patient has a nonfocal exam.         Critical Care Time  Procedures

## 2024-05-14 NOTE — RESPIRATORY THERAPY NOTE
RT Protocol Note  Jn Farfan 53 y.o. male MRN: [id number]  Unit/Bed#: -01 Encounter: [id number]    Assessment    Principal Problem:    Chest pain  Active Problems:    Tobacco user    Traumatic brain injury (HCC)    Depression with anxiety    Noncompliance with medication regimen      Home Pulmonary Medications:  2 puffs albuterol MDI prn       Past Medical History:   Diagnosis Date    Heart attack (HCC) 2021     Social History     Socioeconomic History    Marital status: Single     Spouse name: None    Number of children: None    Years of education: None    Highest education level: None   Occupational History    None   Tobacco Use    Smoking status: Former     Current packs/day: 0.00     Average packs/day: 1 pack/day for 22.0 years (22.0 ttl pk-yrs)     Types: Cigarettes     Start date: 1999     Quit date: 2021     Years since quittin.8    Smokeless tobacco: Never   Vaping Use    Vaping status: Every Day    Substances: Nicotine   Substance and Sexual Activity    Alcohol use: Not Currently    Drug use: Not Currently     Types: Marijuana     Comment:  last used    Sexual activity: Yes   Other Topics Concern    None   Social History Narrative    None     Social Determinants of Health     Financial Resource Strain: Not on file   Food Insecurity: No Food Insecurity (3/15/2023)    Hunger Vital Sign     Worried About Running Out of Food in the Last Year: Never true     Ran Out of Food in the Last Year: Never true   Transportation Needs: No Transportation Needs (3/15/2023)    PRAPARE - Transportation     Lack of Transportation (Medical): No     Lack of Transportation (Non-Medical): No   Physical Activity: Not on file   Stress: Not on file   Social Connections: Not on file   Intimate Partner Violence: Not on file   Housing Stability: Low Risk  (3/15/2023)    Housing Stability Vital Sign     Unable to Pay for Housing in the Last Year: No     Number of Places Lived in the Last Year: 1      "Unstable Housing in the Last Year: No       Subjective         Objective    Physical Exam:   Assessment Type: (P) Assess only  General Appearance: (P) Awake, Alert  Respiratory Pattern: (P) Normal  Chest Assessment: (P) Chest expansion symmetrical  Bilateral Breath Sounds: (P) Clear    Vitals:  Blood pressure 134/93, pulse 67, temperature 97.5 °F (36.4 °C), resp. rate 18, height 5' 10\" (1.778 m), weight 126 kg (278 lb 14.1 oz), SpO2 97%.          Imaging and other studies: I have personally reviewed pertinent reports.            Plan    Respiratory Plan: (P) Home Bronchodilator Patient pathway        Resp Comments: (P) Pt evaluated per respiraotry protocal. Pt admitted with chest pain. Pt uses albuterol MDI prn for a recent bronchitus. Pt states he used it about a month ago. Pt is a current smoker. Pt seen today on rma with clear bs and no sob. Will cont. MDI prn.   "

## 2024-05-14 NOTE — ASSESSMENT & PLAN NOTE
Admit to [facility] telemetry observation  Most likely secondary to stable angina in the setting of the patient being medically noncompliant with his cardiac based medications  Arrival ELOISE score 5  Patient has a history of a STEMI in July 2021 at which time he had a cardiac catheterization completed which resulted in a stent placement to the LAD  Troponin testing x 3-5, 17, 46  EKG testing revealed a heart rate of 76 bpm, nonischemic otherwise  Restart patient's home medications which include aspirin, Plavix, metoprolol, lisinopril, and atorvastatin  Will add full dose Lovenox therapy until seen by cardiology  Consult cardiology  Defer additional imaging and/or testing to cardiology

## 2024-05-14 NOTE — ASSESSMENT & PLAN NOTE
Patient has had issues with paying for his medications  Will get case management involved to see what help they can provide at time of discharge so the patient can avoid missing medications in the future

## 2024-05-14 NOTE — PLAN OF CARE
Problem: CARDIOVASCULAR - ADULT  Goal: Maintains optimal cardiac output and hemodynamic stability  Description: INTERVENTIONS:  - Monitor I/O, vital signs and rhythm  - Monitor for S/S and trends of decreased cardiac output  - Administer and titrate ordered vasoactive medications to optimize hemodynamic stability  - Assess quality of pulses, skin color and temperature  - Assess for signs of decreased coronary artery perfusion  - Instruct patient to report change in severity of symptoms  Outcome: Not Progressing  Goal: Absence of cardiac dysrhythmias or at baseline rhythm  Description: INTERVENTIONS:  - Continuous cardiac monitoring, vital signs, obtain 12 lead EKG if ordered  - Administer antiarrhythmic and heart rate control medications as ordered  - Monitor electrolytes and administer replacement therapy as ordered  Outcome: Not Progressing

## 2024-05-14 NOTE — H&P
[facility]  H&P  Name: Jn Farfan 53 y.o. male I MRN: [id number]  Unit/Bed#: -01 I Date of Admission: 5/14/2024   Date of Service: 5/14/2024 I Hospital Day: 0      Assessment/Plan   * Chest pain  Assessment & Plan  Admit to [facility] telemetry observation  Most likely secondary to stable angina in the setting of the patient being medically noncompliant with his cardiac based medications  Arrival ELOISE score 5  Patient has a history of a STEMI in July 2021 at which time he had a cardiac catheterization completed which resulted in a stent placement to the LAD  Troponin testing x 3-5, 17, 46  EKG testing revealed a heart rate of 76 bpm, nonischemic otherwise  Restart patient's home medications which include aspirin, Plavix, metoprolol, lisinopril, and atorvastatin  Will add full dose Lovenox therapy until seen by cardiology  Consult cardiology  Defer additional imaging and/or testing to cardiology    Depression with anxiety  Assessment & Plan  Will use as needed Ativan    Traumatic brain injury (HCC)  Assessment & Plan  Stable, continue supportive therapy    Noncompliance with medication regimen  Assessment & Plan  Patient has had issues with paying for his medications  Will get case management involved to see what help they can provide at time of discharge so the patient can avoid missing medications in the future    Tobacco user  Assessment & Plan  Cessation counseling provided  Will treat with a nicotine patch         VTE Pharmacologic Prophylaxis: VTE Score: 5 High Risk (Score >/= 5) - Pharmacological DVT Prophylaxis Ordered: enoxaparin (Lovenox). Sequential Compression Devices Ordered.  Code Status: Level 1 - Full Code reviewed with the patient in the presence of his son  Discussion with family: Updated  (son) at bedside.    Anticipated Length of Stay: Patient will be admitted on an observation basis with an anticipated length of stay of less than 2 midnights secondary to  management of chest pain.    Total Time Spent on Date of Encounter in care of patient: 65 mins. This time was spent on one or more of the following: performing physical exam; counseling and coordination of care; obtaining or reviewing history; documenting in the medical record; reviewing/ordering tests, medications or procedures; communicating with other healthcare professionals and discussing with patient's family/caregivers.    Chief Complaint: Tongue pain, upper chest pain, and bilateral arm pain x 1 day    History of Present Illness:  Jn Farfan is a 53 y.o. male with a PMH of medical conditions as outlined below who presents with the aforementioned chief complaint.    In brief, the patient reports that he was in his usual state of health up until approximately earlier this morning.  He states that earlier this morning he had the sudden onset of tongue pain.  The tongue pain was accompanied by upper chest pain, and bilateral arm pain, left greater than right.  He reports that the symptoms were nearly reminiscent of when he had a myocardial infarction during the summer 2021.    Pain characteristics for chest pain as follows:  Location: Upper chest/tongue  Intensity: 5 out of 10  Quality: Described as heaviness  Onset: Acute while talking to his son about the need for his son to clean his room  Radiation: Bilateral arms, left greater than right  Aggravating factors: No clear-cut aggravating factors  Alleviating factors: He reports that the Ativan he received in the emergency room helped him the most  Associated symptoms: Patient admits to some diaphoresis, shortness of breath, palpitations, and nausea earlier today.  He denies any fevers, chills, palpitations, numbness, tingling and/or weakness otherwise    It must also be noted, that the patient has been medically noncompliant with his medications since he ran out of insurance and his medications over 6 months ago.    After coming into the emergency room for  further evaluation, he was found to have uptrending troponins hence the reason for admission.    Review of Systems:  Review of Systems   Constitutional:  Positive for fatigue.   Cardiovascular:  Positive for chest pain.   Gastrointestinal:  Positive for nausea.   Neurological:  Positive for weakness.   All other systems reviewed and are negative.      Past Medical and Surgical History:   Past Medical History:   Diagnosis Date    Heart attack (HCC) 06/28/2021       Past Surgical History:   Procedure Laterality Date    CORONARY ANGIOPLASTY      FINGER FRACTURE SURGERY      MOUTH SURGERY         Meds/Allergies:  Prior to Admission medications    Medication Sig Start Date End Date Taking? Authorizing Provider   albuterol (PROVENTIL HFA,VENTOLIN HFA) 90 mcg/act inhaler inhale 2 puffs by mouth and INTO THE LUNGS every 4 hours if needed for cough shortness of breath or wheezing  Patient not taking: Reported on 6/22/2023 8/7/22   ADELSO Cullen   albuterol (Ventolin HFA) 90 mcg/act inhaler Inhale 2 puffs every 4 (four) hours as needed for wheezing or shortness of breath (cough) 11/2/22   Michelle Behler, PA-C   Aspirin Low Dose 81 MG EC tablet take 1 tablet by mouth once daily 6/12/23   Gopal Scales MD   atorvastatin (LIPITOR) 40 mg tablet Take 1 tablet (40 mg total) by mouth daily with dinner 7/1/21   Jasmeet Hanks MD   benzonatate (TESSALON PERLES) 100 mg capsule Take 1 capsule (100 mg total) by mouth 3 (three) times a day as needed for cough  Patient not taking: Reported on 6/22/2023 3/15/23   Nichole Jean-Baptiste PA-C   budesonide (Pulmicort Flexhaler) 90 MCG/ACT inhaler Inhale 2 puffs 2 (two) times a day Rinse mouth after use.  Patient not taking: Reported on 6/22/2023 6/12/22   ADELSO Cullen   clopidogrel (PLAVIX) 75 mg tablet take 1 tablet by mouth once daily 6/12/23   Gopal Scales MD   lisinopril (ZESTRIL) 5 mg tablet take 1 tablet by mouth once daily 6/12/23   Gopal Scales MD  "  metoprolol succinate (TOPROL-XL) 25 mg 24 hr tablet Take 1 tablet (25 mg total) by mouth daily 23   ADELSO Wang   Respiratory Therapy Supplies (Spirometer) KIT Use 4 (four) times a day  Patient not taking: Reported on 2022 1/15/22   Johnny Bob III, DO     I have reviewed home medications with patient personally.    Allergies:   Allergies   Allergen Reactions    Bupropion Itching       Social History:  Marital Status: Single   Occupation: Works as a [employer]   Patient Pre-hospital Living Situation: Home  Patient Pre-hospital Level of Mobility: walks  Patient Pre-hospital Diet Restrictions: None  Substance Use History:   Social History     Substance and Sexual Activity   Alcohol Use Not Currently     Social History     Tobacco Use   Smoking Status Former    Current packs/day: 0.00    Average packs/day: 1 pack/day for 22.0 years (22.0 ttl pk-yrs)    Types: Cigarettes    Start date: 1999    Quit date: 2021    Years since quittin.8   Smokeless Tobacco Never     Social History     Substance and Sexual Activity   Drug Use Not Currently    Types: Marijuana    Comment: 2016 last used       Family History:  Family History   Problem Relation Age of Onset    Hypertension Mother     Breast cancer Mother     Heart attack Father     Hyperlipidemia Father     Hyperlipidemia Sister     Heart disease Maternal Grandmother     Heart disease Maternal Grandfather     Heart attack Maternal Grandfather        Physical Exam:     Vitals:   Blood Pressure: 128/67 (24 1300)  Pulse: 64 (24 1300)  Temperature: 97.5 °F (36.4 °C) (24 1057)  Temp Source: Temporal (24 1057)  Respirations: 18 (24 1300)  Height: 5' 10\" (177.8 cm) (24 1803)  Weight - Scale: 126 kg (278 lb 14.1 oz) (24 1803)  SpO2: 94 % (24 1300)    Physical Exam  Vitals and nursing note reviewed.   Constitutional:       General: He is not in acute distress.     Appearance: " Normal appearance. He is not ill-appearing.   HENT:      Head: Normocephalic and atraumatic.      Nose: Nose normal.   Eyes:      Extraocular Movements: Extraocular movements intact.      Pupils: Pupils are equal, round, and reactive to light.   Cardiovascular:      Rate and Rhythm: Normal rate and regular rhythm.      Pulses: Normal pulses.      Heart sounds: Normal heart sounds. No murmur heard.     No friction rub. No gallop.   Pulmonary:      Effort: Pulmonary effort is normal.      Breath sounds: Normal breath sounds.   Abdominal:      General: There is no distension.      Palpations: Abdomen is soft. There is no mass.      Tenderness: There is no abdominal tenderness. There is no guarding or rebound.   Musculoskeletal:         General: No swelling or tenderness. Normal range of motion.      Cervical back: Normal range of motion and neck supple. No rigidity. No muscular tenderness.      Right lower leg: No edema.      Left lower leg: No edema.   Skin:     General: Skin is warm.      Capillary Refill: Capillary refill takes less than 2 seconds.      Findings: No erythema or rash.   Neurological:      General: No focal deficit present.      Mental Status: He is alert and oriented to person, place, and time. Mental status is at baseline.   Psychiatric:         Mood and Affect: Mood normal.         Behavior: Behavior normal.          Additional Data:     Lab Results:  Results from last 7 days   Lab Units 05/14/24  1101   WBC Thousand/uL 7.81   HEMOGLOBIN g/dL 17.1*   HEMATOCRIT % 49.8*   PLATELETS Thousands/uL 197   SEGS PCT % 63   LYMPHO PCT % 27   MONO PCT % 6   EOS PCT % 3     Results from last 7 days   Lab Units 05/14/24  1101   SODIUM mmol/L 136   POTASSIUM mmol/L 4.4   CHLORIDE mmol/L 102   CO2 mmol/L 26   BUN mg/dL 13   CREATININE mg/dL 0.84   ANION GAP mmol/L 8   CALCIUM mg/dL 9.6   ALBUMIN g/dL 4.4   TOTAL BILIRUBIN mg/dL 0.62   ALK PHOS U/L 77   ALT U/L 17   AST U/L 15   GLUCOSE RANDOM mg/dL 113                        Lines/Drains:  Invasive Devices       Peripheral Intravenous Line  Duration             Peripheral IV 05/14/24 Right;Ventral (anterior) Forearm <1 day                        Imaging: Reviewed radiology reports from this admission including: chest xray  XR chest portable   ED Interpretation by George Hamilton MD (05/14 1231)   No acute cardiopulm disease          EKG and Other Studies Reviewed on Admission:   EKG: NSR. HR 76 bpm.    ** Please Note: This note has been constructed using a voice recognition system. **

## 2024-05-15 VITALS
DIASTOLIC BLOOD PRESSURE: 91 MMHG | OXYGEN SATURATION: 93 % | TEMPERATURE: 98.1 F | RESPIRATION RATE: 18 BRPM | BODY MASS INDEX: 39.93 KG/M2 | SYSTOLIC BLOOD PRESSURE: 146 MMHG | WEIGHT: 278.88 LBS | HEIGHT: 70 IN | HEART RATE: 84 BPM

## 2024-05-15 LAB
ALBUMIN SERPL BCP-MCNC: 4 G/DL (ref 3.5–5)
ALP SERPL-CCNC: 67 U/L (ref 34–104)
ALT SERPL W P-5'-P-CCNC: 18 U/L (ref 7–52)
ANION GAP SERPL CALCULATED.3IONS-SCNC: 9 MMOL/L (ref 4–13)
AST SERPL W P-5'-P-CCNC: 19 U/L (ref 13–39)
ATRIAL RATE: 68 BPM
ATRIAL RATE: 68 BPM
BASOPHILS # BLD AUTO: 0.04 THOUSANDS/ÂΜL (ref 0–0.1)
BASOPHILS NFR BLD AUTO: 1 % (ref 0–1)
BILIRUB SERPL-MCNC: 0.64 MG/DL (ref 0.2–1)
BUN SERPL-MCNC: 13 MG/DL (ref 5–25)
CALCIUM SERPL-MCNC: 9 MG/DL (ref 8.4–10.2)
CHLORIDE SERPL-SCNC: 103 MMOL/L (ref 96–108)
CO2 SERPL-SCNC: 24 MMOL/L (ref 21–32)
CREAT SERPL-MCNC: 0.83 MG/DL (ref 0.6–1.3)
EOSINOPHIL # BLD AUTO: 0.27 THOUSAND/ÂΜL (ref 0–0.61)
EOSINOPHIL NFR BLD AUTO: 4 % (ref 0–6)
ERYTHROCYTE [DISTWIDTH] IN BLOOD BY AUTOMATED COUNT: 12.8 % (ref 11.6–15.1)
GFR SERPL CREATININE-BSD FRML MDRD: 101 ML/MIN/1.73SQ M
GLUCOSE SERPL-MCNC: 97 MG/DL (ref 65–140)
HCT VFR BLD AUTO: 46.6 % (ref 36.5–49.3)
HGB BLD-MCNC: 15.8 G/DL (ref 12–17)
IMM GRANULOCYTES # BLD AUTO: 0.02 THOUSAND/UL (ref 0–0.2)
IMM GRANULOCYTES NFR BLD AUTO: 0 % (ref 0–2)
LYMPHOCYTES # BLD AUTO: 2.01 THOUSANDS/ÂΜL (ref 0.6–4.47)
LYMPHOCYTES NFR BLD AUTO: 32 % (ref 14–44)
MAGNESIUM SERPL-MCNC: 2.1 MG/DL (ref 1.9–2.7)
MCH RBC QN AUTO: 30.6 PG (ref 26.8–34.3)
MCHC RBC AUTO-ENTMCNC: 33.9 G/DL (ref 31.4–37.4)
MCV RBC AUTO: 90 FL (ref 82–98)
MONOCYTES # BLD AUTO: 0.4 THOUSAND/ÂΜL (ref 0.17–1.22)
MONOCYTES NFR BLD AUTO: 6 % (ref 4–12)
NEUTROPHILS # BLD AUTO: 3.57 THOUSANDS/ÂΜL (ref 1.85–7.62)
NEUTS SEG NFR BLD AUTO: 57 % (ref 43–75)
NRBC BLD AUTO-RTO: 0 /100 WBCS
P AXIS: 60 DEGREES
P AXIS: 63 DEGREES
PHOSPHATE SERPL-MCNC: 4.3 MG/DL (ref 2.7–4.5)
PLATELET # BLD AUTO: 180 THOUSANDS/UL (ref 149–390)
PMV BLD AUTO: 9.7 FL (ref 8.9–12.7)
POTASSIUM SERPL-SCNC: 3.8 MMOL/L (ref 3.5–5.3)
PR INTERVAL: 166 MS
PR INTERVAL: 178 MS
PROT SERPL-MCNC: 6.7 G/DL (ref 6.4–8.4)
QRS AXIS: 46 DEGREES
QRS AXIS: 48 DEGREES
QRSD INTERVAL: 100 MS
QRSD INTERVAL: 102 MS
QT INTERVAL: 396 MS
QT INTERVAL: 408 MS
QTC INTERVAL: 421 MS
QTC INTERVAL: 433 MS
RBC # BLD AUTO: 5.16 MILLION/UL (ref 3.88–5.62)
SODIUM SERPL-SCNC: 136 MMOL/L (ref 135–147)
T WAVE AXIS: 50 DEGREES
T WAVE AXIS: 55 DEGREES
VENTRICULAR RATE: 68 BPM
VENTRICULAR RATE: 68 BPM
WBC # BLD AUTO: 6.31 THOUSAND/UL (ref 4.31–10.16)

## 2024-05-15 PROCEDURE — 99239 HOSP IP/OBS DSCHRG MGMT >30: CPT | Performed by: HOSPITALIST

## 2024-05-15 PROCEDURE — 99244 OFF/OP CNSLTJ NEW/EST MOD 40: CPT | Performed by: INTERNAL MEDICINE

## 2024-05-15 PROCEDURE — 93010 ELECTROCARDIOGRAM REPORT: CPT | Performed by: INTERNAL MEDICINE

## 2024-05-15 PROCEDURE — 85025 COMPLETE CBC W/AUTO DIFF WBC: CPT | Performed by: HOSPITALIST

## 2024-05-15 PROCEDURE — 94760 N-INVAS EAR/PLS OXIMETRY 1: CPT

## 2024-05-15 PROCEDURE — 84100 ASSAY OF PHOSPHORUS: CPT | Performed by: HOSPITALIST

## 2024-05-15 PROCEDURE — 80053 COMPREHEN METABOLIC PANEL: CPT | Performed by: HOSPITALIST

## 2024-05-15 PROCEDURE — 94664 DEMO&/EVAL PT USE INHALER: CPT

## 2024-05-15 PROCEDURE — 83735 ASSAY OF MAGNESIUM: CPT | Performed by: HOSPITALIST

## 2024-05-15 RX ORDER — METOPROLOL SUCCINATE 25 MG/1
25 TABLET, EXTENDED RELEASE ORAL DAILY
Qty: 30 TABLET | Refills: 0 | Status: SHIPPED | OUTPATIENT
Start: 2024-05-15 | End: 2024-05-21 | Stop reason: SDUPTHER

## 2024-05-15 RX ORDER — LISINOPRIL 5 MG/1
5 TABLET ORAL DAILY
Qty: 30 TABLET | Refills: 0 | Status: SHIPPED | OUTPATIENT
Start: 2024-05-15 | End: 2024-05-21 | Stop reason: SDUPTHER

## 2024-05-15 RX ORDER — CLOPIDOGREL BISULFATE 75 MG/1
75 TABLET ORAL DAILY
Qty: 30 TABLET | Refills: 0 | Status: SHIPPED | OUTPATIENT
Start: 2024-05-15 | End: 2024-05-21 | Stop reason: SDUPTHER

## 2024-05-15 RX ORDER — ASPIRIN 81 MG/1
81 TABLET ORAL DAILY
Qty: 30 TABLET | Refills: 0 | Status: SHIPPED | OUTPATIENT
Start: 2024-05-15 | End: 2024-05-21 | Stop reason: SDUPTHER

## 2024-05-15 RX ORDER — ATORVASTATIN CALCIUM 40 MG/1
40 TABLET, FILM COATED ORAL
Qty: 30 TABLET | Refills: 0 | Status: SHIPPED | OUTPATIENT
Start: 2024-05-15 | End: 2024-05-21 | Stop reason: SDUPTHER

## 2024-05-15 RX ADMIN — CLOPIDOGREL 75 MG: 75 TABLET ORAL at 09:26

## 2024-05-15 RX ADMIN — NICOTINE 14 MG: 14 PATCH, EXTENDED RELEASE TRANSDERMAL at 09:34

## 2024-05-15 RX ADMIN — ENOXAPARIN SODIUM 135 MG: 150 INJECTION SUBCUTANEOUS at 09:26

## 2024-05-15 RX ADMIN — ASPIRIN 81 MG: 81 TABLET, COATED ORAL at 09:26

## 2024-05-15 RX ADMIN — LISINOPRIL 5 MG: 5 TABLET ORAL at 09:26

## 2024-05-15 RX ADMIN — METOPROLOL SUCCINATE 25 MG: 25 TABLET, EXTENDED RELEASE ORAL at 09:26

## 2024-05-15 NOTE — NURSING NOTE
Discharge instructions reviewed with Pt. He verbalized understanding. Medications reviewed. Questions answered. Pt stable for discharge home. Cigarettes, lighter, and nicotine lozenges returned. Smoking cessation handouts provided to Pt.

## 2024-05-15 NOTE — ASSESSMENT & PLAN NOTE
Resolved  No further episodes since prior to arrival  High-sensitivity troponin testing x 3-5, 17, 46  Most likely secondary to stable angina, however a component of anxiety is also an underlying possible factor  Status post a cardiology evaluation  No further inpatient testing, treatment, and/or workup is needed  Patient was restarted on all of his home cardiac based medications that he had stopped late last year for unspecified reasons -possibly related to insurance and financial issues  All medications have been represcribed inclusive of aspirin, Lipitor, Plavix, lisinopril, and Toprol-XL  Cardiology will be following up in the near future in the outpatient setting for potential stress testing  Additional outpatient follow-up with PCP.

## 2024-05-15 NOTE — DISCHARGE SUMMARY
[facility]  Discharge- Jn Farfan 1970, 53 y.o. male MRN: [id number]  Unit/Bed#: -Mikal Encounter: [id number]  Primary Care Provider: Franki Cook MD   Date and time admitted to hospital: 5/14/2024 10:52 AM    * Chest pain  Assessment & Plan  Resolved  No further episodes since prior to arrival  High-sensitivity troponin testing x 3-5, 17, 46  Most likely secondary to stable angina, however a component of anxiety is also an underlying possible factor  Status post a cardiology evaluation  No further inpatient testing, treatment, and/or workup is needed  Patient was restarted on all of his home cardiac based medications that he had stopped late last year for unspecified reasons -possibly related to insurance and financial issues  All medications have been represcribed inclusive of aspirin, Lipitor, Plavix, lisinopril, and Toprol-XL  Cardiology will be following up in the near future in the outpatient setting for potential stress testing  Additional outpatient follow-up with PCP.    Depression with anxiety  Assessment & Plan  Stable  Outpatient follow-up with PCP to start medications    Traumatic brain injury (HCC)  Assessment & Plan  Patient with a remote history of a traumatic brain injury-stable    Noncompliance with medication regimen  Assessment & Plan  Compliance counseling provided  All medications have been represcribed with the assistance of case management    Coronary artery disease involving native heart without angina pectoris  Assessment & Plan  Status post a history of an MI and stent placement back in December 2021  See the management as outlined above  Outpatient follow-up with cards        Medical Problems       Resolved Problems  Date Reviewed: 5/15/2024   None       Discharging Physician / Practitioner: Robles Loco MD  PCP: Frnaki Cook MD  Admission Date:   Admission Orders (From admission, onward)       Ordered        05/14/24 1647  Place  in Observation  Once                          Discharge Date: 05/15/24    Consultations During Hospital Stay:  Cardiology    Procedures Performed:   None    Significant Findings / Test Results:   Chest x-ray-no acute cardiopulmonary disease    Incidental Findings:   None    Test Results Pending at Discharge (will require follow up):   None     Outpatient Tests Requested:  None    Complications: None    Reason for Admission: Chest pain    Hospital Course:   Jn Farfan is a 53 y.o. male patient who originally presented to the hospital on 5/14/2024 due to [place] and chest pain.  Please refer to my initial history and physical examination for the initial presenting features and complaints.  In brief, the patient is a 53-year-old male, with a known history of CAD, and is a patient that was admitted to the hospital after he came in with tongue and chest pain.  He was monitored overnight on telemetry.  Troponin testing was completed with results as outlined above.  Patient was seen in conjunction with cardiology.  Patient was cleared by cardiology for discharge with no further inpatient testing, treatment, and/or workup.  It was felt that his chest pain was possibly secondary to stable angina, however a component of underlying anxiety is not ruled out in its entirety either.  Cardiology recommended that the patient be restarted on all of his medications that he had stopped a few months ago.  Prescriptions were provided for the same meds, and case management helped in the situation.  Patient is deemed medically stable for discharge.  He will be following up with cardiology in the near future in the outpatient setting for further management.  Please refer to the assessment/plan portion of this discharge summary as outlined above for additional details regarding his stay.    Please see above list of diagnoses and related plan for additional information.     Condition at Discharge: good    Discharge Day Visit / Exam:  "  Subjective: Patient seen, looks and feels well, is ready to go home  Vitals: Blood Pressure: 146/91 (05/15/24 1113)  Pulse: 84 (05/15/24 1113)  Temperature: 98.1 °F (36.7 °C) (05/15/24 1113)  Temp Source: Oral (05/15/24 1113)  Respirations: 18 (05/15/24 1113)  Height: 5' 10\" (177.8 cm) (05/14/24 1803)  Weight - Scale: 126 kg (278 lb 14.1 oz) (05/14/24 1803)  SpO2: 93 % (05/15/24 1113)  Exam:   Physical Exam  Vitals and nursing note reviewed.   Constitutional:       General: He is not in acute distress.     Appearance: Normal appearance. He is not ill-appearing.   HENT:      Head: Normocephalic and atraumatic.      Nose: Nose normal.   Eyes:      Extraocular Movements: Extraocular movements intact.      Pupils: Pupils are equal, round, and reactive to light.   Cardiovascular:      Rate and Rhythm: Normal rate and regular rhythm.      Pulses: Normal pulses.      Heart sounds: Normal heart sounds. No murmur heard.     No friction rub. No gallop.   Pulmonary:      Effort: Pulmonary effort is normal.      Breath sounds: Normal breath sounds.   Abdominal:      General: There is no distension.      Palpations: Abdomen is soft. There is no mass.      Tenderness: There is no abdominal tenderness. There is no guarding or rebound.   Musculoskeletal:         General: No swelling or tenderness. Normal range of motion.      Cervical back: Normal range of motion and neck supple. No rigidity. No muscular tenderness.      Right lower leg: No edema.      Left lower leg: No edema.   Skin:     General: Skin is warm.      Capillary Refill: Capillary refill takes less than 2 seconds.      Findings: No erythema or rash.   Neurological:      General: No focal deficit present.      Mental Status: He is alert and oriented to person, place, and time. Mental status is at baseline.   Psychiatric:         Mood and Affect: Mood normal.         Behavior: Behavior normal.          Discussion with Family: Patient declined call to . "     Discharge instructions/Information to patient and family:   See after visit summary for information provided to patient and family.      Provisions for Follow-Up Care:  See after visit summary for information related to follow-up care and any pertinent home health orders.      Mobility at time of Discharge:   Basic Mobility Inpatient Raw Score: 24  JH-HLM Goal: 8: Walk 250 feet or more  JH-HLM Achieved: 8: Walk 250 feet ot more  HLM Goal achieved. Continue to encourage appropriate mobility.     Disposition:   Home    Planned Readmission: None     Discharge Statement:  I spent 45 minutes discharging the patient. This time was spent on the day of discharge. I had direct contact with the patient on the day of discharge. Greater than 50% of the total time was spent examining patient, answering all patient questions, arranging and discussing plan of care with patient as well as directly providing post-discharge instructions.  Additional time then spent on discharge activities.    Discharge Medications:  See after visit summary for reconciled discharge medications provided to patient and/or family.      **Please Note: This note may have been constructed using a voice recognition system**

## 2024-05-15 NOTE — PLAN OF CARE
Problem: DISCHARGE PLANNING  Goal: Discharge to home or other facility with appropriate resources  Description: INTERVENTIONS:  - Identify barriers to discharge w/patient and caregiver  - Arrange for needed discharge resources and transportation as appropriate  - Identify discharge learning needs (meds, wound care, etc.)  - Arrange for interpretive services to assist at discharge as needed  - Refer to Case Management Department for coordinating discharge planning if the patient needs post-hospital services based on physician/advanced practitioner order or complex needs related to functional status, cognitive ability, or social support system  Outcome: Progressing     Problem: CARDIOVASCULAR - ADULT  Goal: Maintains optimal cardiac output and hemodynamic stability  Description: INTERVENTIONS:  - Monitor I/O, vital signs and rhythm  - Monitor for S/S and trends of decreased cardiac output  - Administer and titrate ordered vasoactive medications to optimize hemodynamic stability  - Assess quality of pulses, skin color and temperature  - Assess for signs of decreased coronary artery perfusion  - Instruct patient to report change in severity of symptoms  Outcome: Progressing  Goal: Absence of cardiac dysrhythmias or at baseline rhythm  Description: INTERVENTIONS:  - Continuous cardiac monitoring, vital signs, obtain 12 lead EKG if ordered  - Administer antiarrhythmic and heart rate control medications as ordered  - Monitor electrolytes and administer replacement therapy as ordered  Outcome: Progressing

## 2024-05-15 NOTE — ASSESSMENT & PLAN NOTE
Symptoms improved-may be related to anxiety vs angina, though he has no experienced symptoms recently, nor has he had recurrence with activity.  EKG without ischemic changes  Restart cardiac meds  Follow up in outpatient setting to discuss ischemic testing

## 2024-05-15 NOTE — ASSESSMENT & PLAN NOTE
Compliance counseling provided  All medications have been represcribed with the assistance of case management

## 2024-05-15 NOTE — CONSULTS
"Formerly [facility], [facility]  Consult  Name: Jn Farfan 53 y.o. male I MRN: [id number]  Unit/Bed#: -01 I Date of Admission: 5/14/2024   Date of Service: 5/15/2024 I Hospital Day: 0    Inpatient consult to Cardiology  Consult performed by: ADELSO Carrasco  Consult ordered by: Robles Loco MD          Assessment & Plan   Tobacco user  Assessment & Plan  Cessation encouraged    Coronary artery disease involving native heart without angina pectoris  Assessment & Plan  S/p ALVARADO mid LAD, 7/2021  Continue asa, plavix, lipitor, metoprolol, lisinopril    * Chest pain  Assessment & Plan  Symptoms improved-may be related to anxiety vs angina, though he has no experienced symptoms recently, nor has he had recurrence with activity.  EKG without ischemic changes  Restart cardiac meds  Follow up in outpatient setting to discuss ischemic testing              Other summary comments:   Jn attributes his symptoms to anxiety after experiencing a stressful situation at work.  He noted improvement with deep breathing and relaxation.      Trop levels are mildly abnormal though no EKG changes noted.  No recurrent symptoms.  Non-ischemic stress 9/2023.    Resume aspirin, plavix, lisinopril, toprol, and atorvastatin.    Recommend outpatient follow up with primary cardiologist.         Outpatient Cardiologist: Dr. Scales, ADELSO Claire    HPI: Jn Farfan is a 53 y.o. year old male who presented with tongue pain and chest discomfort.      Jn reports that he was on his way to work after a stressful conversation with a coworker.  While he was driving, he began to feel a \"charley horse\" type discomfort under his tongue.  He recalls feeling similar symptoms at the time of his heart attack several years ago.  This caused him to feel increased anxiety and he feels as though he was having a panic attack.  He then began to experience a mild discomfort across his upper chest.  He did some deep breathing and " noted some improvement after approximately 15 minutes.    He decided to come in for further evaluation due to his symptoms.    Jn denies any recurrent symptoms since his presentation.  He is ambulating with a beyer without symptoms.  He denies experiencing any symptoms with any exertion prior to yesterday.    Cardiac history is significant for STEMI 7/20/2021 s/p ALVARADO to a 95% mid LAD lesion.  Due to insurance issues, he has not taken his prescribed medications since December.    EKG: Normal sinus rhythm      MOST  RECENT CARDIAC IMAGING:   Nuclear stress 9/5/2023  No evidence of stress-induced myocardial ischemia    Echo 6/30/2021  EF 60% no wall motion abnormalities    Cardiac catheterization 6/30/2021  Left main: Normal.  LAD: The vessel was medium sized.  Mid LAD: There was a tubular 95 % stenosis at the origin of D1. The lesion was irregularly contoured and without evidence of thrombus. There was ELOISE grade 2 flow through the vessel (partial perfusion). This is a likely culprit for the  patient's clinical presentation. An intervention was performed.  Circumflex: The vessel was medium to large sized.  1st obtuse marginal: The vessel was medium sized. Angiography showed mild atherosclerosis.  RCA: The vessel was medium sized. Angiography showed moderate atherosclerosis.    Review of Systems: a 10 point review of systems was conducted and is negative except for as mentioned in the HPI or as below.        Historical Information   Past Medical History:   Diagnosis Date    Heart attack (HCC) 06/28/2021     Past Surgical History:   Procedure Laterality Date    CORONARY ANGIOPLASTY      FINGER FRACTURE SURGERY      MOUTH SURGERY       Social History     Substance and Sexual Activity   Alcohol Use Not Currently     Social History     Substance and Sexual Activity   Drug Use Not Currently    Types: Marijuana    Comment: 2016 last used     Social History     Tobacco Use   Smoking Status Every Day    Current packs/day:  "0.00    Average packs/day: 1 pack/day for 22.0 years (22.0 ttl pk-yrs)    Types: Cigarettes    Start date: 1999    Last attempt to quit: 2021    Years since quittin.8   Smokeless Tobacco Never       Family History: Significant for heart disease in his father and multiple grandparents      Meds/Allergies   all current active meds have been reviewed    Medications Prior to Admission:     albuterol (PROVENTIL HFA,VENTOLIN HFA) 90 mcg/act inhaler    albuterol (Ventolin HFA) 90 mcg/act inhaler    Aspirin Low Dose 81 MG EC tablet    atorvastatin (LIPITOR) 40 mg tablet    benzonatate (TESSALON PERLES) 100 mg capsule    budesonide (Pulmicort Flexhaler) 90 MCG/ACT inhaler    clopidogrel (PLAVIX) 75 mg tablet    lisinopril (ZESTRIL) 5 mg tablet    metoprolol succinate (TOPROL-XL) 25 mg 24 hr tablet    Respiratory Therapy Supplies (Spirometer) KIT    Allergies   Allergen Reactions    Bupropion Itching       Objective   Vitals: Blood pressure 133/89, pulse 75, temperature 97.7 °F (36.5 °C), temperature source Oral, resp. rate 19, height 5' 10\" (1.778 m), weight 126 kg (278 lb 14.1 oz), SpO2 95%., Body mass index is 40.02 kg/m².,   Orthostatic Blood Pressures      Flowsheet Row Most Recent Value   Blood Pressure 133/89 filed at 05/15/2024 0740   Patient Position - Orthostatic VS Lying filed at 05/15/2024 0740            Systolic (24hrs), Av , Min:128 , Max:156     Diastolic (24hrs), Av, Min:67, Max:97      Physical Exam:    General:  Normal appearance in no distress.  Eyes:  Anicteric.  Oral mucosa:  Moist.  Neck:  No JVD. Carotid upstrokes are brisk without bruits.  No masses.  Chest:  Clear to auscultation.  Cardiac:  No palpable PMI.  Normal S1 and S2.  No murmur gallop or rub.  Abdomen:  Soft and nontender. No palpable organomegaly or aortic enlargement.  Extremities:  No peripheral edema.  Musculoskeletal:  Symmetric.   Vascular:  Pedal pulses are intact.  Neuro:  Grossly symmetric.  Psych:  Alert and " oriented x3.        Lab Results:   Labs reviewed and prominent abnormalities reviewed above and/or below.    Troponins:    Results from last 7 days   Lab Units 05/14/24  1528 05/14/24  1323 05/14/24  1101   HS TNI 0HR ng/L  --   --  5   HS TNI 2HR ng/L  --  17  --    HSTNI D2 ng/L  --  12  --    HS TNI 4HR ng/L 46  --   --    HSTNI D4 ng/L 41*  --   --      BNP:

## 2024-05-15 NOTE — DISCHARGE INSTR - AVS FIRST PAGE
Dear Jn Farfan,     It was our pleasure to care for you here at [facility].  It is our hope that we were always able to exceed the expected standards for your care during your stay.  You were hospitalized due to chest pain.  You were cared for on the medical/surgical floor by Robles Loco MD with the [facility] who covers for your primary care physician (PCP), Franki Cook MD, while you were hospitalized.  You were also seen by the [facility]-Dr. Kramer.  If you have any questions or concerns related to this hospitalization, you may contact us at .  For follow up as well as any medication refills, we recommend that you follow up with your primary care physician.  A registered nurse will reach out to you by phone within a few days after your discharge to answer any additional questions that you may have after going home.  However, at this time we provide for you here, the most important instructions / recommendations at discharge:     Notable Medication Adjustments -   All home medications have been represcribed  Restart aspirin 81 mg daily by mouth  Restart Lipitor 40 mg daily by mouth  Restart Plavix 75 mg daily by mouth  Restart lisinopril 5 mg daily by mouth  Restart Toprol-XL 25 mg daily by mouth  Testing Required after Discharge -   To be further determined in the outpatient setting by your PCP, and or by cardiology  Important follow up information -   Please follow-up with the providers as outlined in this discharge packet  Other Instructions -   Please maintain a healthy diet  Please review this entire after visit summary as additional general instructions including medication list, appointments, activity, diet, any pertinent wound care, and other additional recommendations from your care team that may be provided for you.      Sincerely,     Robles Loco MD

## 2024-05-15 NOTE — PLAN OF CARE
Problem: Potential for Falls  Goal: Patient will remain free of falls  Description: INTERVENTIONS:  - Educate patient/family on patient safety including physical limitations  - Instruct patient to call for assistance with activity   - Consult OT/PT to assist with strengthening/mobility   - Keep Call bell within reach  - Keep bed low and locked with side rails adjusted as appropriate  - Keep care items and personal belongings within reach  - Initiate and maintain comfort rounds  - Make Fall Risk Sign visible to staff  - Offer Toileting every 2 Hours, in advance of need  - Initiate/Maintain bed alarm  - Obtain necessary fall risk management equipment: walker   - Apply yellow socks and bracelet for high fall risk patients  - Consider moving patient to room near nurses station  Outcome: Progressing     Problem: PAIN - ADULT  Goal: Verbalizes/displays adequate comfort level or baseline comfort level  Description: Interventions:  - Encourage patient to monitor pain and request assistance  - Assess pain using appropriate pain scale  - Administer analgesics based on type and severity of pain and evaluate response  - Implement non-pharmacological measures as appropriate and evaluate response  - Consider cultural and social influences on pain and pain management  - Notify physician/advanced practitioner if interventions unsuccessful or patient reports new pain  Outcome: Progressing     Problem: INFECTION - ADULT  Goal: Absence or prevention of progression during hospitalization  Description: INTERVENTIONS:  - Assess and monitor for signs and symptoms of infection  - Monitor lab/diagnostic results  - Monitor all insertion sites, i.e. indwelling lines, tubes, and drains  - Monitor endotracheal if appropriate and nasal secretions for changes in amount and color  - Newton appropriate cooling/warming therapies per order  - Administer medications as ordered  - Instruct and encourage patient and family to use good hand hygiene  technique  - Identify and instruct in appropriate isolation precautions for identified infection/condition  Outcome: Progressing  Goal: Absence of fever/infection during neutropenic period  Description: INTERVENTIONS:  - Monitor WBC    Outcome: Progressing     Problem: SAFETY ADULT  Goal: Patient will remain free of falls  Description: INTERVENTIONS:  - Educate patient/family on patient safety including physical limitations  - Instruct patient to call for assistance with activity   - Consult OT/PT to assist with strengthening/mobility   - Keep Call bell within reach  - Keep bed low and locked with side rails adjusted as appropriate  - Keep care items and personal belongings within reach  - Initiate and maintain comfort rounds  - Make Fall Risk Sign visible to staff  - Offer Toileting every 2 Hours, in advance of need  - Initiate/Maintain bed alarm  - Obtain necessary fall risk management equipment: walker   - Apply yellow socks and bracelet for high fall risk patients  - Consider moving patient to room near nurses station  Outcome: Progressing  Goal: Maintain or return to baseline ADL function  Description: INTERVENTIONS:  -  Assess patient's ability to carry out ADLs; assess patient's baseline for ADL function and identify physical deficits which impact ability to perform ADLs (bathing, care of mouth/teeth, toileting, grooming, dressing, etc.)  - Assess/evaluate cause of self-care deficits   - Assess range of motion  - Assess patient's mobility; develop plan if impaired  - Assess patient's need for assistive devices and provide as appropriate  - Encourage maximum independence but intervene and supervise when necessary  - Involve family in performance of ADLs  - Assess for home care needs following discharge   - Consider OT consult to assist with ADL evaluation and planning for discharge  - Provide patient education as appropriate  Outcome: Progressing  Goal: Maintains/Returns to pre admission functional  level  Description: INTERVENTIONS:  - Perform AM-PAC 6 Click Basic Mobility/ Daily Activity assessment daily.  - Set and communicate daily mobility goal to care team and patient/family/caregiver.   - Collaborate with rehabilitation services on mobility goals if consulted  - Perform Range of Motion 3 times a day.  - Reposition patient every 2 hours.  - Dangle patient 3 times a day  - Stand patient 3 times a day  - Ambulate patient 3 times a day  - Out of bed to chair 3 times a day   - Out of bed for meals 3 times a day  - Out of bed for toileting  - Record patient progress and toleration of activity level   Outcome: Progressing     Problem: DISCHARGE PLANNING  Goal: Discharge to home or other facility with appropriate resources  Description: INTERVENTIONS:  - Identify barriers to discharge w/patient and caregiver  - Arrange for needed discharge resources and transportation as appropriate  - Identify discharge learning needs (meds, wound care, etc.)  - Arrange for interpretive services to assist at discharge as needed  - Refer to Case Management Department for coordinating discharge planning if the patient needs post-hospital services based on physician/advanced practitioner order or complex needs related to functional status, cognitive ability, or social support system  Outcome: Progressing     Problem: Knowledge Deficit  Goal: Patient/family/caregiver demonstrates understanding of disease process, treatment plan, medications, and discharge instructions  Description: Complete learning assessment and assess knowledge base.  Interventions:  - Provide teaching at level of understanding  - Provide teaching via preferred learning methods  Outcome: Progressing     Problem: CARDIOVASCULAR - ADULT  Goal: Maintains optimal cardiac output and hemodynamic stability  Description: INTERVENTIONS:  - Monitor I/O, vital signs and rhythm  - Monitor for S/S and trends of decreased cardiac output  - Administer and titrate ordered  vasoactive medications to optimize hemodynamic stability  - Assess quality of pulses, skin color and temperature  - Assess for signs of decreased coronary artery perfusion  - Instruct patient to report change in severity of symptoms  Outcome: Progressing  Goal: Absence of cardiac dysrhythmias or at baseline rhythm  Description: INTERVENTIONS:  - Continuous cardiac monitoring, vital signs, obtain 12 lead EKG if ordered  - Administer antiarrhythmic and heart rate control medications as ordered  - Monitor electrolytes and administer replacement therapy as ordered  Outcome: Progressing

## 2024-05-15 NOTE — UTILIZATION REVIEW
Initial Clinical Review    Admission: Date/Time/Statement:   Admission Orders (From admission, onward)       Ordered        05/14/24 1647  Place in Observation  Once                          Orders Placed This Encounter   Procedures    Place in Observation     Standing Status:   Standing     Number of Occurrences:   1     Order Specific Question:   Level of Care     Answer:   Med Surg [16]     ED Arrival Information       Expected   -    Arrival   5/14/2024 10:50    Acuity   Emergent              Means of arrival   Walk-In    Escorted by   Family Member    Service   Hospitalist    Admission type   Emergency              Arrival complaint   chest pain             Chief Complaint   Patient presents with    Chest Pain     Chest pain that began around 2 hours ago. Pain goes into both arm pit and tongue took one nitro that helped slightly       Initial Presentation: 53 y.o. male who presented self from home to [facility] ED. Admitted in observation status for evaluation and treatment of chest pain. PMHx: STEMI s/p stent to LAD. Presented w/ sudden onset tongue pain, accompanied by upper chest pain, b/l arm pain (L > R). admits to some diaphoresis, shortness of breath, palpitations, and nausea earlier today. Pt notes some noncompliance w/ medications s/t financial difficulties. On exam, unremarkable. ELOISE score 5. EKG NSR. Plan: trend troponins, EKG, resume PTA meds, Lovenox, supportive care, nicotine patch. Cardiology consulted.      ED Triage Vitals   Temperature Pulse Respirations Blood Pressure SpO2   05/14/24 1057 05/14/24 1057 05/14/24 1057 05/14/24 1057 05/14/24 1057   97.5 °F (36.4 °C) 77 18 131/67 96 %      Temp Source Heart Rate Source Patient Position - Orthostatic VS BP Location FiO2 (%)   05/14/24 1057 05/14/24 1057 05/14/24 1057 05/14/24 1057 --   Temporal Monitor Lying Left arm       Pain Score       05/14/24 1055       3          Wt Readings from Last 1 Encounters:   05/14/24 126 kg (278 lb 14.1 oz)  "    Additional Vital Signs:     Date/Time Temp Pulse Resp BP MAP (mmHg) SpO2 O2 Device   05/15/24 07:40:22 97.7 °F (36.5 °C) 75 19 133/89 104 91 % None (Room air)   05/15/24 03:37:12 97.4 °F (36.3 °C) Abnormal  83 18 135/97 110 93 % None (Room air)   05/14/24 22:46:27 97.7 °F (36.5 °C) 61 18 156/95 115 94 % None (Room air)   05/14/24 1821 -- -- -- -- -- 97 % None (Room air)   05/14/24 18:10:53 97.5 °F (36.4 °C) 67 18 134/93 107 95 % --   05/14/24 1300 -- 64 18 128/67 90 94 % None (Room air)     Pertinent Labs/Diagnostic Test Results:   XR chest portable   ED Interpretation by George Hamilton MD (05/14 1231)   No acute cardiopulm disease      Final Result by Parker Mariee MD (05/14 2020)      No acute cardiopulmonary disease.            Workstation performed: [id number]               Results from last 7 days   Lab Units 05/15/24  0507 05/14/24  1101   WBC Thousand/uL 6.31 7.81   HEMOGLOBIN g/dL 15.8 17.1*   HEMATOCRIT % 46.6 49.8*   PLATELETS Thousands/uL 180 197   TOTAL NEUT ABS Thousands/µL 3.57 4.91         Results from last 7 days   Lab Units 05/15/24  0507 05/14/24  1101   SODIUM mmol/L 136 136   POTASSIUM mmol/L 3.8 4.4   CHLORIDE mmol/L 103 102   CO2 mmol/L 24 26   ANION GAP mmol/L 9 8   BUN mg/dL 13 13   CREATININE mg/dL 0.83 0.84   EGFR ml/min/1.73sq m 101 101   CALCIUM mg/dL 9.0 9.6   MAGNESIUM mg/dL 2.1  --    PHOSPHORUS mg/dL 4.3  --      Results from last 7 days   Lab Units 05/15/24  0507 05/14/24  1101   AST U/L 19 15   ALT U/L 18 17   ALK PHOS U/L 67 77   TOTAL PROTEIN g/dL 6.7 7.5   ALBUMIN g/dL 4.0 4.4   TOTAL BILIRUBIN mg/dL 0.64 0.62         Results from last 7 days   Lab Units 05/15/24  0507 05/14/24  1101   GLUCOSE RANDOM mg/dL 97 113             No results found for: \"BETA-HYDROXYBUTYRATE\"                   Results from last 7 days   Lab Units 05/14/24  1528 05/14/24  1323 05/14/24  1101   HS TNI 0HR ng/L  --   --  5   HS TNI 2HR ng/L  --  17  --    HSTNI D2 ng/L  --  12  --    HS TNI 4HR ng/L 46 "  --   --    HSTNI D4 ng/L 41*  --   --                                                                                                                        ED Treatment:   Medication Administration from 05/14/2024 1050 to 05/14/2024 1758         Date/Time Order Dose Route Action     05/14/2024 1126 EDT LORazepam (ATIVAN) tablet 1 mg 1 mg Oral Given     05/14/2024 1126 EDT aspirin tablet 325 mg 325 mg Oral Given          Past Medical History:   Diagnosis Date    Heart attack (HCC) 06/28/2021     Present on Admission:   Traumatic brain injury (HCC)   Depression with anxiety   Tobacco user      Admitting Diagnosis: Chest pain [R07.9]  CAD (coronary artery disease) [I25.10]  Elevated troponin [R79.89]  Age/Sex: 53 y.o. male  Admission Orders:  Cardiac Diet.  Up & OOB as tolerated.  Telemetry.   SCDs.    Scheduled Medications:  aspirin, 81 mg, Oral, Daily  atorvastatin, 40 mg, Oral, Daily With Dinner  clopidogrel, 75 mg, Oral, Daily  docusate sodium, 100 mg, Oral, BID  enoxaparin, 1 mg/kg, Subcutaneous, Q12H GAL  lisinopril, 5 mg, Oral, Daily  metoprolol succinate, 25 mg, Oral, Daily  nicotine, 14 mg, Transdermal, Daily      Continuous IV Infusions:     PRN Meds:  acetaminophen, 650 mg, Oral, Q6H PRN  albuterol, 2 puff, Inhalation, Q4H PRN  nitroglycerin, 0.4 mg, Sublingual, Q5 Min PRN  ondansetron, 4 mg, Intravenous, Q6H PRN        IP CONSULT TO CARDIOLOGY    Network Utilization Review Department  ATTENTION: Please call with any questions or concerns to [phone] and carefully listen to the prompts so that you are directed to the right person. All voicemails are confidential.   For Discharge needs, contact Care Management DC Support Team at [phone] opt. 2  Send all requests for admission clinical reviews, approved or denied determinations and any other requests to dedicated fax number below belonging to the campus where the patient is receiving treatment. List of dedicated fax numbers for the  Facilities:  FACILITY NAME UR FAX NUMBER   ADMISSION DENIALS (Administrative/Medical Necessity) [phone]   DISCHARGE SUPPORT TEAM (NETWORK) [phone]   PARENT CHILD HEALTH (Maternity/NICU/Pediatrics) [phone]   [facility] [phone]   [facility] [phone]   [facility] [phone]   [facility] [phone]   On license of [facility] [phone]   [facility] [phone]   [facility] [phone]   [facility] [phone]   [facility] [phone]   [facility] [phone]   [facility] [phone]   [facility] [phone]

## 2024-05-15 NOTE — ASSESSMENT & PLAN NOTE
Status post a history of an MI and stent placement back in December 2021  See the management as outlined above  Outpatient follow-up with cards

## 2024-05-16 NOTE — CASE MANAGEMENT
Case Management Assessment & Discharge Planning Note    Patient name Jn Farfan  Location /-01 MRN [id number]  : 1970 Date 5/15/2024       Current Admission Date: 2024  Current Admission Diagnosis:Chest pain   Patient Active Problem List    Diagnosis Date Noted Date Diagnosed    Chest pain 2024     Noncompliance with medication regimen 2024     URI (upper respiratory infection) 03/15/2023     Abnormal CT of the abdomen 03/15/2023     Diverticulitis 2023     Morbid obesity with BMI of 40.0-44.9, adult (HCC) 2022     Coronary artery disease involving native heart without angina pectoris 2021     Tobacco user 2021     Dyslipidemia 2021     Type 1 myocardial infarction (HCC) 2021     Acute ST elevation myocardial infarction (STEMI) due to occlusion of mid portion of left anterior descending (LAD) coronary artery (HCC) 2021     Depression with anxiety 2014     Tobacco dependence syndrome 2013     Traumatic brain injury (HCC) 01/15/2013     Hyperglycemia 01/15/2013     Fatigue 01/15/2013     Family history of acute myocardial infarction 01/15/2013       LOS (days): 0  Geometric Mean LOS (GMLOS) (days):   Days to GMLOS:     OBJECTIVE:              Current admission status: Observation  Referral Reason: Other (d/c planning)    Preferred Pharmacy:   [id number] - [place] [address] [address]  Phone: [phone] Fax: [phone]    [facility] - [place], [place] - [address]  [address]  [place] [address]  Phone: [phone] Fax: [phone]    Primary Care Provider: Franki Cook MD    Primary Insurance: BLUE CROSS  Secondary Insurance:     ASSESSMENT:  Active Health Care Proxies       [facility] Representative - Son   Primary Phone: [phone] (Mobile)                 Advance Directives  Does  patient have a Health Care POA?: No  Was patient offered paperwork?: Yes (declined)  Does patient have Advance Directives?: No  Was patient offered paperwork?: Yes (declined paperwork)    Obs Notice Signed: 05/15/24    Readmission Root Cause  30 Day Readmission: No (obs)    Patient Information  Admitted from:: Home  Mental Status: Alert  During Assessment patient was accompanied by: Not accompanied during assessment  Assessment information provided by:: Patient  Primary Caregiver: Self  Support Systems: Family members, Son, Friend  County of Residence: [facility] do you live in?: [place]  Home entry access options. Select all that apply.: Stairs  Number of steps to enter home.: 2  Do the steps have railings?: Yes  Type of Current Residence: 3 story home  Upon entering residence, is there a bedroom on the main floor (no further steps)?: No  A bedroom is located on the following floor levels of residence (select all that apply):: 2nd Floor  Upon entering residence, is there a bathroom on the main floor (no further steps)?: Yes (1/2 bath on 1st floor & full bath on the 2nd floor)  Number of steps to 2nd floor from main floor: One Flight  Living Arrangements: Lives w/ Son  Is patient a ?: No    Activities of Daily Living Prior to Admission  Functional Status: Independent  Completes ADLs independently?: Yes  Ambulates independently?: Yes  Does patient use assisted devices?: No  Does patient currently own DME?: Yes  What DME does the patient currently own?: Other (Comment) (back brace)  Does patient have a history of Outpatient Therapy (PT/OT)?: Yes (cardiac rehab)  Does the patient have a history of Short-Term Rehab?: No  Does patient have a history of HHC?: No  Does patient currently have HHC?: No         Patient Information Continued  Income Source: Employed (drives cct bus)  Does patient have prescription coverage?: No (good rx coupon  was given)  Does patient receive dialysis treatments?: No  Does  patient have a history of substance abuse?: No  Does patient have a history of Mental Health Diagnosis?: No         Means of Transportation  Means of Transport to Appts:: Drives Self      Social Determinants of Health (SDOH)      Flowsheet Row Most Recent Value   Housing Stability    In the last 12 months, was there a time when you were not able to pay the mortgage or rent on time? N   In the past 12 months, how many times have you moved where you were living? 1   At any time in the past 12 months, were you homeless or living in a shelter (including now)? N   Transportation Needs    In the past 12 months, has lack of transportation kept you from medical appointments or from getting medications? no   In the past 12 months, has lack of transportation kept you from meetings, work, or from getting things needed for daily living? No   Food Insecurity    Within the past 12 months, you worried that your food would run out before you got the money to buy more. Never true   Within the past 12 months, the food you bought just didn't last and you didn't have money to get more. Never true   Utilities    In the past 12 months has the electric, gas, oil, or water company threatened to shut off services in your home? No            DISCHARGE DETAILS:    Discharge planning discussed with:: patient and son was called 13:24 LM & 14:05 pm   Freedom of Choice: Yes  Comments - Freedom of Choice: referral was sent to the financial counselor - pt does not have insurance- pt denies any additonal d/c needs- pt is in agreement with the d/c & d/c plan  CM contacted family/caregiver?: Yes (called son LM for him to call to discuss d/c plan)  Were Treatment Team discharge recommendations reviewed with patient/caregiver?: Yes (pt -LM for son)  Did patient/caregiver verbalize understanding of patient care needs?: Yes (pt - LM for son)  Were patient/caregiver advised of the risks associated with not following Treatment Team discharge  recommendations?: Yes (pt - LM for son)    Contacts  Patient Contacts: Bunny Huang  LM for him to callto discuss d/c plan  Relationship to Patient:: Family (son)  Contact Method: Phone  Phone Number: [phone]  Reason/Outcome: Discharge Planning    Requested Home Health Care         Is the patient interested in HHC at discharge?: No    DME Referral Provided  Referral made for DME?: No    Other Referral/Resources/Interventions Provided:  Financial Resources Provided: Financial Counselor (email sent)  Interventions: Cardiac Rehab  Referral Comments: cardiac rehab - order received    Would you like to participate in our [facility] service program?  : No - Declined    Treatment Team Recommendation: Home (home with family & cardiac rehab & outpt follow up- family)  Discharge Destination Plan:: Home (home with son & cardiac rehab & outpt follow up-family)  Transport at Discharge : Automobile, Family      Pt  in agreement with the d/c & d/c plan                 Accompanied by: Family member           Family notified:: son was called and left a message

## 2024-05-21 ENCOUNTER — OFFICE VISIT (OUTPATIENT)
Dept: CARDIOLOGY CLINIC | Facility: HOSPITAL | Age: 54
End: 2024-05-21

## 2024-05-21 VITALS
HEART RATE: 90 BPM | OXYGEN SATURATION: 95 % | SYSTOLIC BLOOD PRESSURE: 126 MMHG | BODY MASS INDEX: 40.37 KG/M2 | WEIGHT: 282 LBS | HEIGHT: 70 IN | DIASTOLIC BLOOD PRESSURE: 80 MMHG

## 2024-05-21 DIAGNOSIS — I21.02 ACUTE ST ELEVATION MYOCARDIAL INFARCTION (STEMI) DUE TO OCCLUSION OF MID PORTION OF LEFT ANTERIOR DESCENDING (LAD) CORONARY ARTERY (HCC): ICD-10-CM

## 2024-05-21 DIAGNOSIS — I25.10 CORONARY ARTERY DISEASE INVOLVING NATIVE CORONARY ARTERY OF NATIVE HEART WITHOUT ANGINA PECTORIS: Primary | ICD-10-CM

## 2024-05-21 DIAGNOSIS — I25.10 CORONARY ARTERY DISEASE INVOLVING NATIVE HEART WITHOUT ANGINA PECTORIS, UNSPECIFIED VESSEL OR LESION TYPE: ICD-10-CM

## 2024-05-21 DIAGNOSIS — E78.5 DYSLIPIDEMIA: ICD-10-CM

## 2024-05-21 DIAGNOSIS — I21.9 TYPE 1 MYOCARDIAL INFARCTION (HCC): ICD-10-CM

## 2024-05-21 DIAGNOSIS — Z72.0 TOBACCO USER: ICD-10-CM

## 2024-05-21 PROCEDURE — 99214 OFFICE O/P EST MOD 30 MIN: CPT | Performed by: PHYSICIAN ASSISTANT

## 2024-05-21 RX ORDER — LISINOPRIL 5 MG/1
5 TABLET ORAL DAILY
Qty: 30 TABLET | Refills: 6 | Status: SHIPPED | OUTPATIENT
Start: 2024-05-21 | End: 2024-06-20

## 2024-05-21 RX ORDER — ASPIRIN 81 MG/1
81 TABLET ORAL DAILY
Qty: 30 TABLET | Refills: 6 | Status: SHIPPED | OUTPATIENT
Start: 2024-05-21 | End: 2024-06-20

## 2024-05-21 RX ORDER — METOPROLOL SUCCINATE 25 MG/1
25 TABLET, EXTENDED RELEASE ORAL DAILY
Qty: 30 TABLET | Refills: 6 | Status: SHIPPED | OUTPATIENT
Start: 2024-05-21 | End: 2024-06-20

## 2024-05-21 RX ORDER — CLOPIDOGREL BISULFATE 75 MG/1
75 TABLET ORAL DAILY
Qty: 30 TABLET | Refills: 6 | Status: SHIPPED | OUTPATIENT
Start: 2024-05-21 | End: 2024-06-20

## 2024-05-21 RX ORDER — ATORVASTATIN CALCIUM 40 MG/1
40 TABLET, FILM COATED ORAL
Qty: 30 TABLET | Refills: 6 | Status: SHIPPED | OUTPATIENT
Start: 2024-05-21 | End: 2024-06-20

## 2024-05-21 NOTE — PROGRESS NOTES
"                                           Cardiology Follow Up    Jn Farfan  1970  [id number]  CARDIOVASC PHYSICIAN  [address] [address]  [phone]  [phone]    1. Coronary artery disease involving native coronary artery of native heart without angina pectoris        2. Dyslipidemia        3. Tobacco user            Discussion/Summary:  He presents to the office today after he was hospitalized after an episode of jaw/chest discomfort.  EKG showed sinus rhythm without any ischemic changes.  High-sensitivity troponin levels did increase 5->17->46, however technically were within normal limits. He has not had any recurrence of symptoms. Nuclear stress test from September 2023 was negative for myocardial ischemia.  We did discuss pursuing an echocardiogram plus minus a repeat exercise stress test.  However, unfortunately at this time the patient is without insurance and first additional testing.  I have stressed the importance of compliance with his medications.  Medications will be sent to [facility] as the can be more affordable there for patient.  Patient states he should have insurance by the end of the year.  I advised him to call our office when he does obtain insurance and testing can be ordered if needed at that time.    He will be scheduled for a follow-up appointment at the end of the year with Dr. Scales.    Interval History:   Jn Farfan is a 53 y.o. male with coronary artery disease - prior STEM s/p ALVARADO to the LAD in June 2021, dyslipidemia, tobacco use who presents to the office today for follow up.    He was hospitalized recently with chest pain.  Patient states he was in a stressful situation at work when he developed a \"cramping\" sensation in his jaw.  This is similar to when he had his heart attack in 2021. He developed light discomfort across his chest into his left shoulder but not in his armpit as he had with his prior MI.  This concerned him enough to take sublingual " nitroglycerin and subsequently come to the emergency department.  Hs troponin levels were essentially normal. EKG was without ischemic changes. Of note, he had not taken medications since December due to lapse in insurance.  Cardiology evaluate the patient and did not recommend any inpatient workup.     He has not had any recurrent jaw discomfort since discharge.  This was also his first episode of jaw discomfort since his heart attack in 2021.  He does not participate in any formal exercise but with the activity he performs denies any chest pain/pressure/discomfort or shortness of breath.  He denies any significant lower extremity edema.  He states he is only picked up 2 medications from the pharmacy as they were the only 2 available-atorvastatin and Plavix.  He does not have any insurance at this current time.    Medical Problems       Problem List       Type 1 myocardial infarction (HCC)    Acute ST elevation myocardial infarction (STEMI) due to occlusion of mid portion of left anterior descending (LAD) coronary artery (HCC)    Coronary artery disease involving native heart without angina pectoris    Tobacco user    Dyslipidemia    Traumatic brain injury (HCC)    Tobacco dependence syndrome    Morbid obesity with BMI of 40.0-44.9, adult ([facility])    Hyperglycemia    Fatigue    Family history of acute myocardial infarction    Depression with anxiety    Diverticulitis    URI (upper respiratory infection)    Abnormal CT of the abdomen    Chest pain    Noncompliance with medication regimen        Past Medical History:   Diagnosis Date    Heart attack (HCC) 06/28/2021     Social History     Socioeconomic History    Marital status: Single     Spouse name: Not on file    Number of children: Not on file    Years of education: Not on file    Highest education level: Not on file   Occupational History    Not on file   Tobacco Use    Smoking status: Every Day     Current packs/day: 0.00     Average packs/day: 1 pack/day for  22.0 years (22.0 ttl pk-yrs)     Types: Cigarettes     Start date: 1999     Last attempt to quit: 2021     Years since quittin.8    Smokeless tobacco: Never   Vaping Use    Vaping status: Every Day    Substances: Nicotine   Substance and Sexual Activity    Alcohol use: Not Currently    Drug use: Not Currently     Types: Marijuana     Comment: 2016 last used    Sexual activity: Yes   Other Topics Concern    Not on file   Social History Narrative    Not on file     Social Determinants of Health     Financial Resource Strain: Not on file   Food Insecurity: No Food Insecurity (5/15/2024)    Hunger Vital Sign     Worried About Running Out of Food in the Last Year: Never true     Ran Out of Food in the Last Year: Never true   Transportation Needs: No Transportation Needs (5/15/2024)    PRAPARE - Transportation     Lack of Transportation (Medical): No     Lack of Transportation (Non-Medical): No   Physical Activity: Not on file   Stress: Not on file   Social Connections: Not on file   Intimate Partner Violence: Not on file   Housing Stability: Low Risk  (5/15/2024)    Housing Stability Vital Sign     Unable to Pay for Housing in the Last Year: No     Number of Times Moved in the Last Year: 1     Homeless in the Last Year: No      Family History   Problem Relation Age of Onset    Hypertension Mother     Breast cancer Mother     Heart attack Father     Hyperlipidemia Father     Hyperlipidemia Sister     Heart disease Maternal Grandmother     Heart disease Maternal Grandfather     Heart attack Maternal Grandfather      Past Surgical History:   Procedure Laterality Date    CORONARY ANGIOPLASTY      FINGER FRACTURE SURGERY      MOUTH SURGERY         Current Outpatient Medications:     atorvastatin (LIPITOR) 40 mg tablet, Take 1 tablet (40 mg total) by mouth daily with dinner, Disp: 30 tablet, Rfl: 0    clopidogrel (PLAVIX) 75 mg tablet, Take 1 tablet (75 mg total) by mouth daily, Disp: 30 tablet, Rfl: 0     "lisinopril (ZESTRIL) 5 mg tablet, Take 1 tablet (5 mg total) by mouth daily, Disp: 30 tablet, Rfl: 0    aspirin (Aspirin Low Dose) 81 mg EC tablet, Take 1 tablet (81 mg total) by mouth daily (Patient not taking: Reported on 5/21/2024), Disp: 30 tablet, Rfl: 0    metoprolol succinate (TOPROL-XL) 25 mg 24 hr tablet, Take 1 tablet (25 mg total) by mouth daily (Patient not taking: Reported on 5/21/2024), Disp: 30 tablet, Rfl: 0    Respiratory Therapy Supplies (Spirometer) KIT, Use 4 (four) times a day (Patient not taking: Reported on 2/2/2022), Disp: 1 kit, Rfl: 0  Allergies   Allergen Reactions    Bupropion Itching       Labs:     Chemistry        Component Value Date/Time    K 3.8 05/15/2024 0507     05/15/2024 0507    CO2 24 05/15/2024 0507    BUN 13 05/15/2024 0507    CREATININE 0.83 05/15/2024 0507        Component Value Date/Time    CALCIUM 9.0 05/15/2024 0507    ALKPHOS 67 05/15/2024 0507    AST 19 05/15/2024 0507    ALT 18 05/15/2024 0507            No results found for: \"CHOL\"  Lab Results   Component Value Date    HDL 31 (L) 12/23/2021    HDL 30 (L) 06/29/2021     Lab Results   Component Value Date    LDLCALC 98 12/23/2021    LDLCALC 71 06/29/2021     Lab Results   Component Value Date    TRIG 262 (H) 12/23/2021    TRIG 334 (H) 06/29/2021     No results found for: \"CHOLHDL\"    Imaging: XR chest portable    Result Date: 5/14/2024  Narrative: XR CHEST PORTABLE INDICATION: chest pain. COMPARISON: 11/2/2022 FINDINGS: Clear lungs. No pneumothorax or pleural effusion. Normal cardiomediastinal silhouette. A few old healed right posterior rib fracture deformities with callus formation again noted. No acute osseous fracture or subluxation. Normal upper abdomen.     Impression: No acute cardiopulmonary disease. Workstation performed: [id number]         Review of Systems   All other systems reviewed and are negative.      Vitals:    05/21/24 1354   BP: 126/80   Pulse: 90   SpO2: 95%     Vitals:    05/21/24 1354 " "  Weight: 128 kg (282 lb)     Height: 5' 10\" (177.8 cm)   Body mass index is 40.46 kg/m².    Physical Exam:  Physical Exam  Vitals reviewed.   Constitutional:       General: He is not in acute distress.     Appearance: He is well-developed. He is obese. He is not diaphoretic.   HENT:      Head: Normocephalic and atraumatic.   Eyes:      Pupils: Pupils are equal, round, and reactive to light.   Neck:      Vascular: No carotid bruit.   Cardiovascular:      Rate and Rhythm: Normal rate and regular rhythm.      Pulses:           Radial pulses are 2+ on the right side and 2+ on the left side.      Heart sounds: S1 normal and S2 normal. No murmur heard.  Pulmonary:      Effort: Pulmonary effort is normal. No respiratory distress.      Breath sounds: Normal breath sounds. No wheezing or rales.   Abdominal:      General: There is no distension.      Palpations: Abdomen is soft.      Tenderness: There is no abdominal tenderness.   Musculoskeletal:         General: Normal range of motion.      Cervical back: Normal range of motion.      Right lower leg: No edema.      Left lower leg: No edema.   Skin:     General: Skin is warm and dry.      Findings: No erythema.   Neurological:      General: No focal deficit present.      Mental Status: He is alert and oriented to person, place, and time.      Gait: Gait normal.   Psychiatric:         Mood and Affect: Mood normal.         Behavior: Behavior normal.         "

## 2024-05-21 NOTE — LETTER
May 21, 2024     Patient: Jn Farfan  YOB: 1970  Date of Visit: 5/21/2024      To Whom it May Concern:    Jn Farfan is under my professional care. Jn was seen in my office on 5/21/2024. Please excuse him for a half day today.     If you have any questions or concerns, please don't hesitate to call.         Sincerely,          Danya Winston PA-C

## 2024-08-18 ENCOUNTER — APPOINTMENT (OUTPATIENT)
Dept: URGENT CARE | Facility: CLINIC | Age: 54
End: 2024-08-18

## 2024-12-19 ENCOUNTER — TELEPHONE (OUTPATIENT)
Dept: CARDIOLOGY CLINIC | Facility: HOSPITAL | Age: 54
End: 2024-12-19

## 2024-12-19 DIAGNOSIS — R07.9 CHEST PAIN, UNSPECIFIED TYPE: Primary | ICD-10-CM

## 2024-12-19 RX ORDER — NITROGLYCERIN 0.4 MG/1
0.4 TABLET SUBLINGUAL
Qty: 30 TABLET | Refills: 0 | Status: SHIPPED | OUTPATIENT
Start: 2024-12-19

## 2024-12-19 NOTE — TELEPHONE ENCOUNTER
Medication not on active med list    Reason for call:   [x] Refill   [] Prior Auth  [] Other:     Office:   [] PCP/Provider -   [x] Specialty/Provider - Cardio    Medication: nitroglycerin (NITROSTAT) 0.4 mg  Place 1 tablet (0.4 mg total) under the tongue every 5 (five) minutes as needed for chest pain           Pharmacy: [facility] [address] [place] [address], [address]      Does the patient have enough for 3 days?   [] Yes   [x] No - Send as HP to POD

## 2024-12-19 NOTE — TELEPHONE ENCOUNTER
Pt called back and reports that he does not use the Nitro regularly. The last time he used it was last March or April when he had what was found to be a panic attack and not before that for three years. He would just like to have it for reassurance.

## 2024-12-19 NOTE — TELEPHONE ENCOUNTER
Patient requesting refill for NTG SL but is not on active medication list.    Please send refill if appropriate to [facility].

## 2025-01-09 ENCOUNTER — OFFICE VISIT (OUTPATIENT)
Dept: CARDIOLOGY CLINIC | Facility: HOSPITAL | Age: 55
End: 2025-01-09
Payer: COMMERCIAL

## 2025-01-09 VITALS
DIASTOLIC BLOOD PRESSURE: 86 MMHG | OXYGEN SATURATION: 97 % | HEIGHT: 70 IN | WEIGHT: 283.4 LBS | SYSTOLIC BLOOD PRESSURE: 124 MMHG | BODY MASS INDEX: 40.57 KG/M2 | HEART RATE: 88 BPM

## 2025-01-09 DIAGNOSIS — I25.10 CORONARY ARTERY DISEASE INVOLVING NATIVE CORONARY ARTERY OF NATIVE HEART WITHOUT ANGINA PECTORIS: ICD-10-CM

## 2025-01-09 DIAGNOSIS — I21.9 TYPE 1 MYOCARDIAL INFARCTION (HCC): Primary | ICD-10-CM

## 2025-01-09 DIAGNOSIS — E78.5 DYSLIPIDEMIA: ICD-10-CM

## 2025-01-09 DIAGNOSIS — F17.200 TOBACCO DEPENDENCE SYNDROME: ICD-10-CM

## 2025-01-09 PROCEDURE — 99214 OFFICE O/P EST MOD 30 MIN: CPT | Performed by: INTERNAL MEDICINE
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
## 2025-02-11 ENCOUNTER — APPOINTMENT (EMERGENCY)
Dept: RADIOLOGY | Facility: HOSPITAL | Age: 55
End: 2025-02-11
Payer: COMMERCIAL

## 2025-02-11 ENCOUNTER — HOSPITAL ENCOUNTER (EMERGENCY)
Facility: HOSPITAL | Age: 55
End: 2025-02-12
Attending: EMERGENCY MEDICINE
Payer: COMMERCIAL

## 2025-02-11 DIAGNOSIS — I21.4 NSTEMI (NON-ST ELEVATED MYOCARDIAL INFARCTION) (HCC): ICD-10-CM

## 2025-02-11 DIAGNOSIS — R07.9 CHEST PAIN: Primary | ICD-10-CM

## 2025-02-11 DIAGNOSIS — R79.89 ELEVATED TROPONIN: ICD-10-CM

## 2025-02-11 LAB
2HR DELTA HS TROPONIN: 73 NG/L
4HR DELTA HS TROPONIN: 418 NG/L
ALBUMIN SERPL BCG-MCNC: 4.6 G/DL (ref 3.5–5)
ALP SERPL-CCNC: 79 U/L (ref 34–104)
ALT SERPL W P-5'-P-CCNC: 24 U/L (ref 7–52)
ANION GAP SERPL CALCULATED.3IONS-SCNC: 9 MMOL/L (ref 4–13)
APTT PPP: 26 SECONDS (ref 23–34)
AST SERPL W P-5'-P-CCNC: 19 U/L (ref 13–39)
BASOPHILS # BLD AUTO: 0.05 THOUSANDS/ÂΜL (ref 0–0.1)
BASOPHILS NFR BLD AUTO: 1 % (ref 0–1)
BILIRUB SERPL-MCNC: 0.75 MG/DL (ref 0.2–1)
BNP SERPL-MCNC: 13 PG/ML (ref 0–100)
BUN SERPL-MCNC: 16 MG/DL (ref 5–25)
CALCIUM SERPL-MCNC: 9.3 MG/DL (ref 8.4–10.2)
CARDIAC TROPONIN I PNL SERPL HS: 4 NG/L (ref ?–50)
CARDIAC TROPONIN I PNL SERPL HS: 422 NG/L (ref ?–50)
CARDIAC TROPONIN I PNL SERPL HS: 77 NG/L (ref ?–50)
CHLORIDE SERPL-SCNC: 102 MMOL/L (ref 96–108)
CO2 SERPL-SCNC: 24 MMOL/L (ref 21–32)
CREAT SERPL-MCNC: 0.86 MG/DL (ref 0.6–1.3)
EOSINOPHIL # BLD AUTO: 0.26 THOUSAND/ÂΜL (ref 0–0.61)
EOSINOPHIL NFR BLD AUTO: 4 % (ref 0–6)
ERYTHROCYTE [DISTWIDTH] IN BLOOD BY AUTOMATED COUNT: 12.6 % (ref 11.6–15.1)
ERYTHROCYTE [DISTWIDTH] IN BLOOD BY AUTOMATED COUNT: 12.7 % (ref 11.6–15.1)
GFR SERPL CREATININE-BSD FRML MDRD: 99 ML/MIN/1.73SQ M
GLUCOSE SERPL-MCNC: 153 MG/DL (ref 65–140)
HCT VFR BLD AUTO: 47.8 % (ref 36.5–49.3)
HCT VFR BLD AUTO: 47.8 % (ref 36.5–49.3)
HGB BLD-MCNC: 16.7 G/DL (ref 12–17)
HGB BLD-MCNC: 16.8 G/DL (ref 12–17)
IMM GRANULOCYTES # BLD AUTO: 0.03 THOUSAND/UL (ref 0–0.2)
IMM GRANULOCYTES NFR BLD AUTO: 0 % (ref 0–2)
INR PPP: 0.91 (ref 0.85–1.19)
LIPASE SERPL-CCNC: 39 U/L (ref 11–82)
LYMPHOCYTES # BLD AUTO: 1.67 THOUSANDS/ÂΜL (ref 0.6–4.47)
LYMPHOCYTES NFR BLD AUTO: 22 % (ref 14–44)
MCH RBC QN AUTO: 31 PG (ref 26.8–34.3)
MCH RBC QN AUTO: 31.1 PG (ref 26.8–34.3)
MCHC RBC AUTO-ENTMCNC: 34.9 G/DL (ref 31.4–37.4)
MCHC RBC AUTO-ENTMCNC: 35.1 G/DL (ref 31.4–37.4)
MCV RBC AUTO: 88 FL (ref 82–98)
MCV RBC AUTO: 89 FL (ref 82–98)
MONOCYTES # BLD AUTO: 0.42 THOUSAND/ÂΜL (ref 0.17–1.22)
MONOCYTES NFR BLD AUTO: 6 % (ref 4–12)
NEUTROPHILS # BLD AUTO: 5.05 THOUSANDS/ÂΜL (ref 1.85–7.62)
NEUTS SEG NFR BLD AUTO: 67 % (ref 43–75)
NRBC BLD AUTO-RTO: 0 /100 WBCS
PLATELET # BLD AUTO: 187 THOUSANDS/UL (ref 149–390)
PLATELET # BLD AUTO: 206 THOUSANDS/UL (ref 149–390)
PMV BLD AUTO: 9.2 FL (ref 8.9–12.7)
PMV BLD AUTO: 9.3 FL (ref 8.9–12.7)
POTASSIUM SERPL-SCNC: 4.1 MMOL/L (ref 3.5–5.3)
PROT SERPL-MCNC: 7.2 G/DL (ref 6.4–8.4)
PROTHROMBIN TIME: 12.7 SECONDS (ref 12.3–15)
RBC # BLD AUTO: 5.39 MILLION/UL (ref 3.88–5.62)
RBC # BLD AUTO: 5.41 MILLION/UL (ref 3.88–5.62)
SODIUM SERPL-SCNC: 135 MMOL/L (ref 135–147)
WBC # BLD AUTO: 7.48 THOUSAND/UL (ref 4.31–10.16)
WBC # BLD AUTO: 8.6 THOUSAND/UL (ref 4.31–10.16)

## 2025-02-11 PROCEDURE — 85027 COMPLETE CBC AUTOMATED: CPT | Performed by: PHYSICIAN ASSISTANT

## 2025-02-11 PROCEDURE — 85730 THROMBOPLASTIN TIME PARTIAL: CPT | Performed by: PHYSICIAN ASSISTANT

## 2025-02-11 PROCEDURE — 93005 ELECTROCARDIOGRAM TRACING: CPT

## 2025-02-11 PROCEDURE — 99406 BEHAV CHNG SMOKING 3-10 MIN: CPT | Performed by: EMERGENCY MEDICINE

## 2025-02-11 PROCEDURE — 83690 ASSAY OF LIPASE: CPT | Performed by: EMERGENCY MEDICINE

## 2025-02-11 PROCEDURE — 85610 PROTHROMBIN TIME: CPT | Performed by: PHYSICIAN ASSISTANT

## 2025-02-11 PROCEDURE — 99285 EMERGENCY DEPT VISIT HI MDM: CPT

## 2025-02-11 PROCEDURE — 80053 COMPREHEN METABOLIC PANEL: CPT | Performed by: EMERGENCY MEDICINE

## 2025-02-11 PROCEDURE — 71045 X-RAY EXAM CHEST 1 VIEW: CPT

## 2025-02-11 PROCEDURE — 96366 THER/PROPH/DIAG IV INF ADDON: CPT

## 2025-02-11 PROCEDURE — 99245 OFF/OP CONSLTJ NEW/EST HI 55: CPT | Performed by: INTERNAL MEDICINE

## 2025-02-11 PROCEDURE — 99291 CRITICAL CARE FIRST HOUR: CPT | Performed by: EMERGENCY MEDICINE

## 2025-02-11 PROCEDURE — 96375 TX/PRO/DX INJ NEW DRUG ADDON: CPT

## 2025-02-11 PROCEDURE — 83880 ASSAY OF NATRIURETIC PEPTIDE: CPT | Performed by: EMERGENCY MEDICINE

## 2025-02-11 PROCEDURE — 36415 COLL VENOUS BLD VENIPUNCTURE: CPT | Performed by: EMERGENCY MEDICINE

## 2025-02-11 PROCEDURE — 85025 COMPLETE CBC W/AUTO DIFF WBC: CPT | Performed by: EMERGENCY MEDICINE

## 2025-02-11 PROCEDURE — 96365 THER/PROPH/DIAG IV INF INIT: CPT

## 2025-02-11 PROCEDURE — 84484 ASSAY OF TROPONIN QUANT: CPT | Performed by: EMERGENCY MEDICINE

## 2025-02-11 RX ORDER — ASPIRIN 81 MG/1
1 TABLET, CHEWABLE ORAL ONCE
Status: DISCONTINUED | OUTPATIENT
Start: 2025-02-11 | End: 2025-02-11

## 2025-02-11 RX ORDER — NICOTINE 21 MG/24HR
21 PATCH, TRANSDERMAL 24 HOURS TRANSDERMAL ONCE
Status: COMPLETED | OUTPATIENT
Start: 2025-02-11 | End: 2025-02-12

## 2025-02-11 RX ORDER — ASPIRIN 81 MG/1
81 TABLET, CHEWABLE ORAL DAILY
Status: DISCONTINUED | OUTPATIENT
Start: 2025-02-12 | End: 2025-02-12 | Stop reason: HOSPADM

## 2025-02-11 RX ORDER — HEPARIN SODIUM 10000 [USP'U]/100ML
3-20 INJECTION, SOLUTION INTRAVENOUS
Status: DISCONTINUED | OUTPATIENT
Start: 2025-02-11 | End: 2025-02-12 | Stop reason: HOSPADM

## 2025-02-11 RX ORDER — SODIUM CHLORIDE 9 MG/ML
3 INJECTION INTRAVENOUS
Status: DISCONTINUED | OUTPATIENT
Start: 2025-02-11 | End: 2025-02-12 | Stop reason: HOSPADM

## 2025-02-11 RX ORDER — HEPARIN SODIUM 1000 [USP'U]/ML
2000 INJECTION, SOLUTION INTRAVENOUS; SUBCUTANEOUS EVERY 6 HOURS PRN
Status: DISCONTINUED | OUTPATIENT
Start: 2025-02-11 | End: 2025-02-12 | Stop reason: HOSPADM

## 2025-02-11 RX ORDER — HEPARIN SODIUM 1000 [USP'U]/ML
4000 INJECTION, SOLUTION INTRAVENOUS; SUBCUTANEOUS ONCE
Status: COMPLETED | OUTPATIENT
Start: 2025-02-11 | End: 2025-02-11

## 2025-02-11 RX ORDER — HEPARIN SODIUM 1000 [USP'U]/ML
4000 INJECTION, SOLUTION INTRAVENOUS; SUBCUTANEOUS EVERY 6 HOURS PRN
Status: DISCONTINUED | OUTPATIENT
Start: 2025-02-11 | End: 2025-02-12 | Stop reason: HOSPADM

## 2025-02-11 RX ORDER — CLOPIDOGREL BISULFATE 75 MG/1
75 TABLET ORAL DAILY
Status: DISCONTINUED | OUTPATIENT
Start: 2025-02-12 | End: 2025-02-12 | Stop reason: HOSPADM

## 2025-02-11 RX ADMIN — HEPARIN SODIUM 11.1 UNITS/KG/HR: 10000 INJECTION, SOLUTION INTRAVENOUS at 18:20

## 2025-02-11 RX ADMIN — HEPARIN SODIUM 4000 UNITS: 1000 INJECTION, SOLUTION INTRAVENOUS; SUBCUTANEOUS at 18:20

## 2025-02-11 RX ADMIN — NICOTINE 21 MG: 21 PATCH, EXTENDED RELEASE TRANSDERMAL at 14:36

## 2025-02-11 NOTE — ED PROVIDER NOTES
Time reflects when diagnosis was documented in both MDM as applicable and the Disposition within this note       Time User Action Codes Description Comment    2/11/2025  4:55 PM BinsLane velezin T Add [R07.9] Chest pain     2/11/2025  4:56 PM Binsrosie, Jarett T Add [R79.89] Elevated troponin     2/11/2025  4:56 PM Binsrosie, Jarett T Add [I21.4] NSTEMI (non-ST elevated myocardial infarction) (HCC)           ED Disposition       ED Disposition   Transfer to Another Facility-In Network    Condition   --    Date/Time   Tue Feb 11, 2025  4:56 PM    Comment   Jn Farfan should be transferred out to [facility].               Assessment & Plan       Medical Decision Making  Patient is a pleasant 54-year-old male who was in his normal course of employment as a  this morning mainly he developed left upper chest wall pain but then noticed what he describes as a hot sensation, pressure sensation just below his chin and in his left armpit and these are the symptoms he had when he had his last heart attack.    Problems Addressed:  Chest pain: acute illness or injury  Elevated troponin: acute illness or injury  NSTEMI (non-ST elevated myocardial infarction) (HCC): acute illness or injury    Amount and/or Complexity of Data Reviewed  Labs: ordered.  Radiology: ordered.  Discussion of management or test interpretation with external provider(s): Discussed [facility] Dr. Child of Cardiology.  Since patient is troponins are still rising and known coronary artery disease, felt patient would be best suited in a Cath Lab capable facility sent to [facility] or [facility].  Patient access center notified.  Discussed with hospitalist at [facility] accepted patient transfer.  EMTALA forms completed and placed on chart.  Patient understands and aware of need for transfer for to Cath Lab capable facility.    Risk  OTC drugs.  Prescription drug management.    Smoking cessation discussion with patient:  3-5 minute  conversation with the patient regarding smoking cessation. I explained at length risks of long term smoking, associated health conditions, benefits of quitting, as well as quitting methods. Patient receptive and understands         Medications   sodium chloride (PF) 0.9 % injection 3 mL (has no administration in time range)   aspirin chewable tablet 81 mg (81 mg Oral Given 2/12/25 0820)   clopidogrel (PLAVIX) tablet 75 mg (75 mg Oral Given 2/12/25 0820)   heparin (porcine) 25,000 units in 0.45% NaCl 250 mL infusion (premix) (15.1 Units/kg/hr × 90 kg (Order-Specific) Intravenous New Bag 2/12/25 1252)   heparin (porcine) injection 4,000 Units (4,000 Units Intravenous Given 2/12/25 0055)   heparin (porcine) injection 2,000 Units (has no administration in time range)   metoprolol succinate (TOPROL-XL) 24 hr tablet 25 mg (25 mg Oral Given 2/12/25 0820)   atorvastatin (LIPITOR) tablet 40 mg (40 mg Oral Given 2/12/25 1547)   lisinopril (ZESTRIL) tablet 5 mg (5 mg Oral Given 2/12/25 0820)   nicotine (NICODERM CQ) 21 mg/24 hr TD 24 hr patch 21 mg (21 mg Transdermal Patch Removed 2/12/25 1504)   heparin (porcine) injection 4,000 Units (4,000 Units Intravenous Given 2/11/25 1820)       ED Risk Strat Scores   HEART Risk Score      Flowsheet Row Most Recent Value   Heart Score Risk Calculator    History 1 Filed at: 02/11/2025 1318   ECG 0 Filed at: 02/11/2025 1318   Age 1 Filed at: 02/11/2025 1318   Risk Factors 2 Filed at: 02/11/2025 1318   Troponin 2 Filed at: 02/11/2025 1318   HEART Score 6 Filed at: 02/11/2025 1318          HEART Risk Score      Flowsheet Row Most Recent Value   Heart Score Risk Calculator    History 1 Filed at: 02/11/2025 1318   ECG 0 Filed at: 02/11/2025 1318   Age 1 Filed at: 02/11/2025 1318   Risk Factors 2 Filed at: 02/11/2025 1318   Troponin 2 Filed at: 02/11/2025 1318   HEART Score 6 Filed at: 02/11/2025 1318              Critical Care Time Statement: Upon my evaluation, this patient had a high  probability of imminent or life-threatening deterioration due to acute coronary syndrome (Type I NSTEMI), which required my direct attention, intervention, and personal management.  I spent a total of 40 minutes directly providing critical care services, including evaluating for the presence of life-threatening injuries or illnesses, complex medical decision making (to support/prevent further life-threatening deterioration)., and titration of continuous IV medications (drips). This time is exclusive of procedures, teaching, treating other patients, family meetings, and any prior time recorded by providers other than myself.  .cctimespent                SBIRT 22yo+      Flowsheet Row Most Recent Value   Initial Alcohol Screen: US AUDIT-C     1. How often do you have a drink containing alcohol? 0 Filed at: 02/11/2025 1010   2. How many drinks containing alcohol do you have on a typical day you are drinking?  0 Filed at: 02/11/2025 1010   3a. Male UNDER 65: How often do you have five or more drinks on one occasion? 0 Filed at: 02/11/2025 1010   3b. FEMALE Any Age, or MALE 65+: How often do you have 4 or more drinks on one occassion? 0 Filed at: 02/11/2025 1010   Audit-C Score 0 Filed at: 02/11/2025 1010   ALEJANDRA: How many times in the past year have you...    Used an illegal drug or used a prescription medication for non-medical reasons? Never Filed at: 02/11/2025 1010                            History of Present Illness       Chief Complaint   Patient presents with    Chest Pain     CP that this 0900. Has a HX of a  maker, and symptoms felt similar. Had CP that radiated into his left arm, tongue felt like it was twisting like prior. When EMS arrived, they gave a ASA and was able to calm him down. CP feels better now. EMS felt like it was anxiety related.       Past Medical History:   Diagnosis Date    Heart attack (HCC) 06/28/2021      Past Surgical History:   Procedure Laterality Date    CORONARY ANGIOPLASTY       FINGER FRACTURE SURGERY      MOUTH SURGERY        Family History   Problem Relation Age of Onset    Hypertension Mother     Breast cancer Mother     Heart attack Father     Hyperlipidemia Father     Hyperlipidemia Sister     Heart disease Maternal Grandmother     Heart disease Maternal Grandfather     Heart attack Maternal Grandfather       Social History     Tobacco Use    Smoking status: Every Day     Current packs/day: 0.00     Average packs/day: 1 pack/day for 22.0 years (22.0 ttl pk-yrs)     Types: Cigarettes     Start date: 6/28/1999     Last attempt to quit: 6/28/2021     Years since quitting: 3.6    Smokeless tobacco: Never   Vaping Use    Vaping status: Every Day    Substances: Nicotine   Substance Use Topics    Alcohol use: Not Currently    Drug use: Not Currently     Types: Marijuana     Comment: 2016 last used      E-Cigarette/Vaping    E-Cigarette Use Current Every Day User     Comments Trying to wean off.  Down to 6mg       E-Cigarette/Vaping Substances    Nicotine Yes     THC No     CBD No     Flavoring No     Other No     Unknown No       I have reviewed and agree with the history as documented.     Patient is a pleasant 54-year-old male who was in his normal course of employment as a  this morning mainly he developed left upper chest wall pain but then noticed what he describes as a hot sensation, pressure sensation just below his chin and in his left armpit and these are the symptoms he had when he had his last heart attack.  Patient has previous drug-eluting stent to his LAD.  Patient continues to smoke cigarettes approximately 1 pack/day.  He is compliant with his medication regimen.  Felt slightly diaphoretic with this event.  EMS was summoned and administered 324 mg aspirin which she states improved his symptoms.  He states his only complaint now assist continued on sensation in his left armpit area.  No recent trauma injury or illness.          Review of Systems   Constitutional:   Negative for activity change, appetite change, chills and fever.   HENT:  Negative for ear pain, hearing loss, rhinorrhea, sneezing, sore throat and trouble swallowing.    Eyes:  Negative for pain and visual disturbance.   Respiratory:  Negative for cough, choking, chest tightness, shortness of breath, wheezing and stridor.    Cardiovascular:  Positive for chest pain. Negative for palpitations and leg swelling.   Gastrointestinal:  Negative for abdominal pain, constipation, diarrhea, nausea and vomiting.   Genitourinary:  Negative for difficulty urinating, dysuria, frequency, hematuria and testicular pain.   Musculoskeletal:  Negative for arthralgias, back pain, gait problem and neck pain.   Skin:  Negative for color change and rash.   Allergic/Immunologic: Negative for immunocompromised state.   Neurological:  Negative for dizziness, seizures, syncope, speech difficulty, weakness, light-headedness, numbness and headaches.   Psychiatric/Behavioral:  Negative for confusion. The patient is nervous/anxious.    All other systems reviewed and are negative.          Objective       ED Triage Vitals [02/11/25 1014]   Temperature Pulse Blood Pressure Respirations SpO2 Patient Position - Orthostatic VS   98 °F (36.7 °C) 84 165/95 18 93 % Lying      Temp Source Heart Rate Source BP Location FiO2 (%) Pain Score    Temporal Monitor Left arm -- No Pain      Vitals      Date and Time Temp Pulse SpO2 Resp BP Pain Score FACES Pain Rating User   02/12/25 1600 98.2 °F (36.8 °C) 77 94 % 18 120/82 No Pain -- EZ   02/12/25 1400 98.2 °F (36.8 °C) 76 93 % 21 119/77 No Pain -- CLS   02/12/25 1100 98.5 °F (36.9 °C) 66 90 % 21 126/69 No Pain -- CLS   02/12/25 1000 98.6 °F (37 °C) 67 93 % 22 117/78 No Pain -- CLS   02/12/25 0820 -- 66 -- -- 122/73 -- -- CLS   02/12/25 0816 -- 71 -- -- 132/86 -- -- EM   02/12/25 0800 98.5 °F (36.9 °C) 66 92 % 20 122/73 No Pain -- CLS   02/12/25 0700 98.1 °F (36.7 °C) 71 94 % 25 132/86 No Pain --    02/12/25  0600 98.2 °F (36.8 °C) 70 90 % 20 131/75 No Pain --    02/12/25 0500 98.2 °F (36.8 °C) 75 90 % 19 121/74 No Pain -- MR   02/12/25 0400 98.2 °F (36.8 °C) 75 90 % 18 117/68 No Pain --    02/12/25 0300 98 °F (36.7 °C) 75 91 % 19 134/73 No Pain --    02/12/25 0200 98 °F (36.7 °C) 73 92 % 20 113/67 No Pain --    02/12/25 0100 98 °F (36.7 °C) 73 93 % 22 116/72 No Pain --    02/11/25 2300 98 °F (36.7 °C) 75 91 % 19 154/79 No Pain --    02/11/25 2200 98 °F (36.7 °C) 76 91 % 18 146/75 No Pain --    02/11/25 2100 98 °F (36.7 °C) 77 92 % 22 132/74 No Pain --    02/11/25 2000 98 °F (36.7 °C) 74 91 % 14 128/88 No Pain -- MR   02/11/25 1900 98 °F (36.7 °C) 66 92 % 18 144/83 No Pain -- MR   02/11/25 1830 97.8 °F (36.6 °C) 75 94 % 16 133/77 No Pain -- SK   02/11/25 1600 97.9 °F (36.6 °C) 72 94 % 16 124/65 No Pain -- SK   02/11/25 1500 97.9 °F (36.6 °C) 74 94 % 15 124/67 No Pain -- SK   02/11/25 1400 98.3 °F (36.8 °C) 76 92 % 18 143/84 No Pain -- SK   02/11/25 1200 98.1 °F (36.7 °C) 78 94 % 18 136/83 No Pain -- SK   02/11/25 1100 98.1 °F (36.7 °C) 80 94 % 20 147/101 No Pain -- SK   02/11/25 1014 98 °F (36.7 °C) 84 93 % 18 165/95 No Pain -- BL            Physical Exam  Vitals and nursing note reviewed.   Constitutional:       General: He is not in acute distress.     Appearance: Normal appearance. He is well-developed and normal weight. He is not ill-appearing, toxic-appearing or diaphoretic.   HENT:      Head: Normocephalic and atraumatic.      Nose: Nose normal.      Mouth/Throat:      Mouth: Mucous membranes are moist.      Pharynx: Oropharynx is clear.   Eyes:      General: No scleral icterus.     Extraocular Movements: Extraocular movements intact.      Conjunctiva/sclera: Conjunctivae normal.   Cardiovascular:      Rate and Rhythm: Normal rate and regular rhythm.      Pulses: Normal pulses.           Carotid pulses are 2+ on the right side and 2+ on the left side.       Radial pulses are 2+ on the right side and  2+ on the left side.        Dorsalis pedis pulses are 2+ on the right side and 2+ on the left side.        Posterior tibial pulses are 2+ on the right side and 2+ on the left side.      Heart sounds: Normal heart sounds. No murmur heard.  Pulmonary:      Effort: Pulmonary effort is normal. No respiratory distress.      Breath sounds: Normal breath sounds. No decreased breath sounds, wheezing, rhonchi or rales.   Chest:      Chest wall: No tenderness.   Abdominal:      General: Bowel sounds are normal. There is no distension.      Palpations: Abdomen is soft. There is no mass.      Tenderness: There is no abdominal tenderness. There is no right CVA tenderness, left CVA tenderness, guarding or rebound.   Musculoskeletal:         General: No tenderness, deformity or signs of injury. Normal range of motion.      Cervical back: Normal range of motion and neck supple. No rigidity or tenderness.      Right lower leg: No tenderness. No edema.      Left lower leg: No tenderness. No edema.   Lymphadenopathy:      Cervical: No cervical adenopathy.   Skin:     General: Skin is warm and dry.      Capillary Refill: Capillary refill takes less than 2 seconds.      Coloration: Skin is not jaundiced or pale.      Findings: No bruising, erythema, lesion or rash.   Neurological:      General: No focal deficit present.      Mental Status: He is alert and oriented to person, place, and time. Mental status is at baseline.      Motor: No abnormal muscle tone.   Psychiatric:         Mood and Affect: Mood normal.         Behavior: Behavior normal.         Results Reviewed       Procedure Component Value Units Date/Time    Lipid Panel with Direct LDL reflex [[id number]]  (Abnormal) Collected: 02/12/25 1251    Lab Status: Final result Specimen: Blood from Arm, Left Updated: 02/12/25 1319     Cholesterol 134 mg/dL      Triglycerides 216 mg/dL      HDL, Direct 31 mg/dL      LDL Calculated 60 mg/dL     APTT [[id number]]  (Abnormal) Collected:  02/12/25 1251    Lab Status: Final result Specimen: Blood from Arm, Left Updated: 02/12/25 1316     PTT 63 seconds     High Sensitivity Troponin I Random [[id number]]  (Abnormal) Collected: 02/12/25 0732    Lab Status: Final result Specimen: Blood from Hand, Left Updated: 02/12/25 0813     HS TnI random 189 ng/L     APTT [[id number]]  (Abnormal) Collected: 02/12/25 0652    Lab Status: Final result Specimen: Blood from Arm, Right Updated: 02/12/25 0727     PTT 65 seconds     APTT [[id number]]  (Abnormal) Collected: 02/12/25 0023    Lab Status: Final result Specimen: Blood from Arm, Left Updated: 02/12/25 0043     PTT 42 seconds     APTT [[id number]]  (Normal) Collected: 02/11/25 1821    Lab Status: Final result Specimen: Blood from Arm, Left Updated: 02/11/25 1848     PTT 26 seconds     Protime-INR [[id number]]  (Normal) Collected: 02/11/25 1821    Lab Status: Final result Specimen: Blood from Arm, Left Updated: 02/11/25 1848     Protime 12.7 seconds      INR 0.91    Narrative:      INR Therapeutic Range    Indication                                             INR Range      Atrial Fibrillation                                               2.0-3.0  Hypercoagulable State                                    2.0.2.3  Left Ventricular Asist Device                            2.0-3.0  Mechanical Heart Valve                                  -    Aortic(with afib, MI, embolism, HF, LA enlargement,    and/or coagulopathy)                                     2.0-3.0 (2.5-3.5)     Mitral                                                             2.5-3.5  Prosthetic/Bioprosthetic Heart Valve               2.0-3.0  Venous thromboembolism (VTE: VT, PE        2.0-3.0    CBC [[id number]]  (Normal) Collected: 02/11/25 1821    Lab Status: Final result Specimen: Blood from Arm, Left Updated: 02/11/25 1838     WBC 8.60 Thousand/uL      RBC 5.39 Million/uL      Hemoglobin 16.7 g/dL      Hematocrit 47.8 %      MCV 89 fL      MCH 31.0 pg       MCHC 34.9 g/dL      RDW 12.6 %      Platelets 206 Thousands/uL      MPV 9.3 fL     HS Troponin I 4hr [[id number]]  (Abnormal) Collected: 02/11/25 1427    Lab Status: Final result Specimen: Blood from Arm, Right Updated: 02/11/25 1536     hs TnI 4hr 422 ng/L      Delta 4hr hsTnI 418 ng/L     HS Troponin I 2hr [[id number]]  (Abnormal) Collected: 02/11/25 1218    Lab Status: Final result Specimen: Blood from Arm, Left Updated: 02/11/25 1251     hs TnI 2hr 77 ng/L      Delta 2hr hsTnI 73 ng/L     HS Troponin 0hr (reflex protocol) [[id number]]  (Normal) Collected: 02/11/25 1019    Lab Status: Final result Specimen: Blood from Arm, Right Updated: 02/11/25 1052     hs TnI 0hr 4 ng/L     B-Type Natriuretic Peptide(BNP) [[id number]]  (Normal) Collected: 02/11/25 1019    Lab Status: Final result Specimen: Blood from Arm, Right Updated: 02/11/25 1051     BNP 13 pg/mL     Comprehensive metabolic panel [[id number]]  (Abnormal) Collected: 02/11/25 1019    Lab Status: Final result Specimen: Blood from Arm, Right Updated: 02/11/25 1048     Sodium 135 mmol/L      Potassium 4.1 mmol/L      Chloride 102 mmol/L      CO2 24 mmol/L      ANION GAP 9 mmol/L      BUN 16 mg/dL      Creatinine 0.86 mg/dL      Glucose 153 mg/dL      Calcium 9.3 mg/dL      AST 19 U/L      ALT 24 U/L      Alkaline Phosphatase 79 U/L      Total Protein 7.2 g/dL      Albumin 4.6 g/dL      Total Bilirubin 0.75 mg/dL      eGFR 99 ml/min/1.73sq m     Narrative:      National Kid[employer] Disease Foundation guidelines for Chronic Kidney Disease (CKD):     Stage 1 with normal or high GFR (GFR > 90 mL/min/1.73 square meters)    Stage 2 Mild CKD (GFR = 60-89 mL/min/1.73 square meters)    Stage 3A Moderate CKD (GFR = 45-59 mL/min/1.73 square meters)    Stage 3B Moderate CKD (GFR = 30-44 mL/min/1.73 square meters)    Stage 4 Severe CKD (GFR = 15-29 mL/min/1.73 square meters)    Stage 5 End Stage CKD (GFR <15 mL/min/1.73 square meters)  Note: GFR calculation is accurate only with a  steady state creatinine    Lipase [[id number]]  (Normal) Collected: 02/11/25 1019    Lab Status: Final result Specimen: Blood from Arm, Right Updated: 02/11/25 1048     Lipase 39 u/L     CBC and differential [[id number]] Collected: 02/11/25 1019    Lab Status: Final result Specimen: Blood from Arm, Right Updated: 02/11/25 1028     WBC 7.48 Thousand/uL      RBC 5.41 Million/uL      Hemoglobin 16.8 g/dL      Hematocrit 47.8 %      MCV 88 fL      MCH 31.1 pg      MCHC 35.1 g/dL      RDW 12.7 %      MPV 9.2 fL      Platelets 187 Thousands/uL      nRBC 0 /100 WBCs      Segmented % 67 %      Immature Grans % 0 %      Lymphocytes % 22 %      Monocytes % 6 %      Eosinophils Relative 4 %      Basophils Relative 1 %      Absolute Neutrophils 5.05 Thousands/µL      Absolute Immature Grans 0.03 Thousand/uL      Absolute Lymphocytes 1.67 Thousands/µL      Absolute Monocytes 0.42 Thousand/µL      Eosinophils Absolute 0.26 Thousand/µL      Basophils Absolute 0.05 Thousands/µL             X-ray chest 1 view portable   Final Interpretation by Parker Sandoval MD (02/11 1111)      No focal consolidation, pleural effusion, or pneumothorax.            Resident: Kirstin Vences I, the attending radiologist, have reviewed the images and agree with the final report above.      Workstation performed: [id number]             ECG 12 Lead Documentation Only    Date/Time: 2/11/2025 10:19 AM    Performed by: Jarett Moreno DO  Authorized by: Jarett Moreno DO    Indications / Diagnosis:  Chest pain  ECG reviewed by me, the ED Provider: yes    Patient location:  ED  Rate:     ECG rate:  86    ECG rate assessment: normal    Rhythm:     Rhythm: sinus rhythm    Ectopy:     Ectopy: none    QRS:     QRS axis:  Normal    QRS intervals:  Normal  Conduction:     Conduction: normal    ST segments:     ST segments:  Normal  T waves:     T waves: normal    ECG 12 Lead Documentation Only    Date/Time: 2/11/2025 12:29 PM    Performed  by: Jarett Moreno DO  Authorized by: Jarett Moreno DO    Indications / Diagnosis:  Chest pain (#2)  ECG reviewed by me, the ED Provider: yes    Patient location:  ED  Rate:     ECG rate:  72    ECG rate assessment: normal    Rhythm:     Rhythm: sinus rhythm    Ectopy:     Ectopy: none    QRS:     QRS axis:  Normal    QRS intervals:  Normal  Conduction:     Conduction: normal    ST segments:     ST segments:  Normal  T waves:     T waves: normal        ED Medication and Procedure Management   Prior to Admission Medications   Prescriptions Last Dose Informant Patient Reported? Taking?   Respiratory Therapy Supplies (Spirometer) KIT   No No   Sig: Use 4 (four) times a day   Patient not taking: Reported on 1/9/2025   aspirin (Aspirin Low Dose) 81 mg EC tablet Past Month  No Yes   Sig: Take 1 tablet (81 mg total) by mouth daily   atorvastatin (LIPITOR) 40 mg tablet 2/10/2025  No Yes   Sig: Take 1 tablet (40 mg total) by mouth daily with dinner   clopidogrel (PLAVIX) 75 mg tablet 2/11/2025  No Yes   Sig: Take 1 tablet (75 mg total) by mouth daily   lisinopril (ZESTRIL) 5 mg tablet 2/11/2025  No Yes   Sig: Take 1 tablet (5 mg total) by mouth daily   metoprolol succinate (TOPROL-XL) 25 mg 24 hr tablet 2/11/2025  No Yes   Sig: Take 1 tablet (25 mg total) by mouth daily   nitroglycerin (NITROSTAT) 0.4 mg SL tablet Past Month  No Yes   Sig: Place 1 tablet (0.4 mg total) under the tongue every 5 (five) minutes as needed for chest pain      Facility-Administered Medications: None     Patient's Medications   Discharge Prescriptions    No medications on file     No discharge procedures on file.  ED SEPSIS DOCUMENTATION   Time reflects when diagnosis was documented in both MDM as applicable and the Disposition within this note       Time User Action Codes Description Comment    2/11/2025  4:55 PM Jarett Moreno Add [R07.9] Chest pain     2/11/2025  4:56 PM Jarett Moreno Add [R79.89] Elevated troponin      2/11/2025  4:56 PM Jarett Moreno Add [I21.4] NSTEMI (non-ST elevated myocardial infarction) (HCC)                  Jarett Moreno, DO  02/12/25 1628       Jarett Moreno, DO  02/12/25 1629
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
## 2025-02-11 NOTE — EMTALA/ACUTE CARE TRANSFER
[facility] EMERGENCY DEPARTMENT  [address] [address]  Dept: [phone]      EMTALA TRANSFER CONSENT    NAME Jn DE DIOS 1970                              MRN [id number]    I have been informed of my rights regarding examination, treatment, and transfer   by Dr. Jarett Moreno,*    Benefits:    Need for cath lab availability center    Risks:   Delay in care, crash during transfer      Transfer Request   I acknowledge that my medical condition has been evaluated and explained to me by the emergency department physician or other qualified medical person and/or my attending physician who has recommended and offered to me further medical examination and treatment. I understand the Hospital's obligation with respect to the treatment and stabilization of my emergency medical condition. I nevertheless request to be transferred. I release the Hospital, the doctor, and any other persons caring for me from all responsibility or liability for any injury or ill effects that may result from my transfer and agree to accept all responsibility for the consequences of my choice to transfer, rather than receive stabilizing treatment at the Hospital. I understand that because the transfer is my request, my insurance may not provide reimbursement for the services.  The Hospital will assist and direct me and my family in how to make arrangements for transfer, but the hospital is not liable for any fees charged by the transport service.  In spite of this understanding, I refuse to consent to further medical examination and treatment which has been offered to me, and request transfer to  . I authorize the performance of emergency medical procedures and treatments upon me in both transit and upon arrival at the receiving facility.  Additionally, I authorize the release of any and all medical records to the receiving facility and request they be  transported with me, if possible.    I authorize the performance of emergency medical procedures and treatments upon me in both transit and upon arrival at the receiving facility.  Additionally, I authorize the release of any and all medical records to the receiving facility and request they be transported with me, if possible.  I understand that the safest mode of transportation during a medical emergency is an ambulance and that the Hospital advocates the use of this mode of transport. Risks of traveling to the receiving facility by car, including absence of medical control, life sustaining equipment, such as oxygen, and medical personnel has been explained to me and I fully understand them.    (CORY CORRECT BOX BELOW)  [ X ]  I consent to the stated transfer and to be transported by ambulance/helicopter.  [  ]  I consent to the stated transfer, but refuse transportation by ambulance and accept full responsibility for my transportation by car.  I understand the risks of non-ambulance transfers and I exonerate the Hospital and its staff from any deterioration in my condition that results from this refusal.    X___________________________________________    DATE  25  TIME________  Signature of patient or legally responsible individual signing on patient behalf           RELATIONSHIP TO PATIENT_________________________          Provider Certification    NAME Jn Farfan                                        [facility] 1970                              MRN [id number]    A medical screening exam was performed on the above named patient.  Based on the examination:    Condition Necessitating Transfer The primary encounter diagnosis was Chest pain. Diagnoses of Elevated troponin and NSTEMI (non-ST elevated myocardial infarction) (HCC) were also pertinent to this visit.    Patient Condition:   Stable    Reason for Transfer:   Need for cath lab availability    Transfer Requirements: Facility    B  Space available  and qualified personnel available for treatment as acknowledged by   PACS  Agreed to accept transfer and to provide appropriate medical treatment as acknowledged by        Dr. López ([facility])  Appropriate medical records of the examination and treatment of the patient are provided at the time of transfer   STAFF INITIAL WHEN COMPLETED _______  Transfer will be performed by qualified personnel from    and appropriate transfer equipment as required, including the use of necessary and appropriate life support measures.    Provider Certification: I have examined the patient and explained the following risks and benefits of being transferred/refusing transfer to the patient/family:         Based on these reasonable risks and benefits to the patient and/or the unborn child(lisa), and based upon the information available at the time of the patient’s examination, I certify that the medical benefits reasonably to be expected from the provision of appropriate medical treatments at another medical facility outweigh the increasing risks, if any, to the individual’s medical condition, and in the case of labor to the unborn child, from effecting the transfer.    X____________________________________________ DATE 02/11/25        TIME_______      ORIGINAL - SEND TO MEDICAL RECORDS   COPY - SEND WITH PATIENT DURING TRANSFER

## 2025-02-12 ENCOUNTER — HOSPITAL ENCOUNTER (INPATIENT)
Facility: HOSPITAL | Age: 55
LOS: 1 days | Discharge: HOME | End: 2025-02-13
Attending: FAMILY MEDICINE | Admitting: FAMILY MEDICINE
Payer: COMMERCIAL

## 2025-02-12 ENCOUNTER — APPOINTMENT (EMERGENCY)
Dept: NON INVASIVE DIAGNOSTICS | Facility: HOSPITAL | Age: 55
End: 2025-02-12
Payer: COMMERCIAL

## 2025-02-12 VITALS
DIASTOLIC BLOOD PRESSURE: 73 MMHG | BODY MASS INDEX: 41.23 KG/M2 | WEIGHT: 288 LBS | HEIGHT: 70 IN | SYSTOLIC BLOOD PRESSURE: 109 MMHG | TEMPERATURE: 98.4 F | OXYGEN SATURATION: 94 % | RESPIRATION RATE: 18 BRPM | HEART RATE: 67 BPM

## 2025-02-12 DIAGNOSIS — F41.8 DEPRESSION WITH ANXIETY: ICD-10-CM

## 2025-02-12 DIAGNOSIS — I20.0 UNSTABLE ANGINA PECTORIS (HCC): Primary | ICD-10-CM

## 2025-02-12 PROBLEM — I25.10 CORONARY ARTERY DISEASE INVOLVING NATIVE HEART WITHOUT ANGINA PECTORIS: Status: RESOLVED | Noted: 2021-08-19 | Resolved: 2025-02-12

## 2025-02-12 LAB
AORTIC ROOT: 3.3 CM
APTT PPP: 42 SECONDS (ref 23–34)
APTT PPP: 63 SECONDS (ref 23–34)
APTT PPP: 65 SECONDS (ref 23–34)
AV LVOT MEAN GRADIENT: 2 MMHG
AV LVOT PEAK GRADIENT: 4 MMHG
BSA FOR ECHO PROCEDURE: 2.44 M2
CARDIAC TROPONIN I PNL SERPL HS: 189 NG/L (ref 8–18)
CHOLEST SERPL-MCNC: 134 MG/DL (ref ?–200)
DOP CALC LVOT PEAK VEL VTI: 22 CM
DOP CALC LVOT PEAK VEL: 1.04 M/S
E WAVE DECELERATION TIME: 199 MS
E/A RATIO: 0.84
FRACTIONAL SHORTENING: 27 (ref 28–44)
HDLC SERPL-MCNC: 31 MG/DL
INTERVENTRICULAR SEPTUM IN DIASTOLE (PARASTERNAL SHORT AXIS VIEW): 1.2 CM
INTERVENTRICULAR SEPTUM: 1.2 CM (ref 0.6–1.1)
LAAS-AP2: 12.7 CM2
LAAS-AP4: 10.4 CM2
LDLC SERPL CALC-MCNC: 60 MG/DL (ref 0–100)
LEFT ATRIUM SIZE: 4.2 CM
LEFT ATRIUM VOLUME (MOD BIPLANE): 25 ML
LEFT ATRIUM VOLUME INDEX (MOD BIPLANE): 10.2 ML/M2
LEFT INTERNAL DIMENSION IN SYSTOLE: 3 CM (ref 2.1–4)
LEFT VENTRICULAR INTERNAL DIMENSION IN DIASTOLE: 4.1 CM (ref 3.5–6)
LEFT VENTRICULAR POSTERIOR WALL IN END DIASTOLE: 1.2 CM
LEFT VENTRICULAR STROKE VOLUME: 41 ML
LV EF US.2D.A4C+ESTIMATED: 58 %
LVSV (TEICH): 41 ML
MV E'TISSUE VEL-LAT: 12 CM/S
MV E'TISSUE VEL-SEP: 11 CM/S
MV PEAK A VEL: 0.79 M/S
MV PEAK E VEL: 66 CM/S
MV STENOSIS PRESSURE HALF TIME: 58 MS
MV VALVE AREA P 1/2 METHOD: 3.79
RA PRESSURE ESTIMATED: 3 MMHG
RIGHT ATRIUM AREA SYSTOLE A4C: 12.8 CM2
RV PSP: 25 MMHG
SL CV LEFT ATRIUM LENGTH A2C: 4.5 CM
SL CV LV EF: 60
SL CV PED ECHO LEFT VENTRICLE DIASTOLIC VOLUME (MOD BIPLANE) 2D: 76 ML
SL CV PED ECHO LEFT VENTRICLE SYSTOLIC VOLUME (MOD BIPLANE) 2D: 35 ML
TR MAX PG: 22 MMHG
TR PEAK VELOCITY: 2.4 M/S
TRICUSPID ANNULAR PLANE SYSTOLIC EXCURSION: 2.5 CM
TRICUSPID VALVE PEAK REGURGITATION VELOCITY: 2.35 M/S
TRIGL SERPL-MCNC: 216 MG/DL (ref ?–150)

## 2025-02-12 PROCEDURE — 96376 TX/PRO/DX INJ SAME DRUG ADON: CPT

## 2025-02-12 PROCEDURE — 36415 COLL VENOUS BLD VENIPUNCTURE: CPT | Performed by: EMERGENCY MEDICINE

## 2025-02-12 PROCEDURE — 93306 TTE W/DOPPLER COMPLETE: CPT

## 2025-02-12 PROCEDURE — 96366 THER/PROPH/DIAG IV INF ADDON: CPT

## 2025-02-12 PROCEDURE — 85730 THROMBOPLASTIN TIME PARTIAL: CPT | Performed by: EMERGENCY MEDICINE

## 2025-02-12 PROCEDURE — 84484 ASSAY OF TROPONIN QUANT: CPT | Performed by: INTERNAL MEDICINE

## 2025-02-12 PROCEDURE — 99242 OFF/OP CONSLTJ NEW/EST SF 20: CPT | Performed by: INTERNAL MEDICINE

## 2025-02-12 PROCEDURE — 99223 1ST HOSP IP/OBS HIGH 75: CPT | Performed by: FAMILY MEDICINE

## 2025-02-12 PROCEDURE — 80061 LIPID PANEL: CPT | Performed by: INTERNAL MEDICINE

## 2025-02-12 PROCEDURE — 93306 TTE W/DOPPLER COMPLETE: CPT | Performed by: INTERNAL MEDICINE

## 2025-02-12 PROCEDURE — 99214 OFFICE O/P EST MOD 30 MIN: CPT | Performed by: INTERNAL MEDICINE

## 2025-02-12 RX ORDER — ASPIRIN 81 MG/1
81 TABLET, CHEWABLE ORAL DAILY
Status: DISCONTINUED | OUTPATIENT
Start: 2025-02-13 | End: 2025-02-13

## 2025-02-12 RX ORDER — METOPROLOL SUCCINATE 25 MG/1
25 TABLET, EXTENDED RELEASE ORAL DAILY
Status: DISCONTINUED | OUTPATIENT
Start: 2025-02-13 | End: 2025-02-13 | Stop reason: HOSPADM

## 2025-02-12 RX ORDER — LISINOPRIL 5 MG/1
5 TABLET ORAL DAILY
Status: DISCONTINUED | OUTPATIENT
Start: 2025-02-13 | End: 2025-02-13 | Stop reason: HOSPADM

## 2025-02-12 RX ORDER — METOPROLOL SUCCINATE 25 MG/1
25 TABLET, EXTENDED RELEASE ORAL DAILY
Status: DISCONTINUED | OUTPATIENT
Start: 2025-02-12 | End: 2025-02-12 | Stop reason: HOSPADM

## 2025-02-12 RX ORDER — SODIUM CHLORIDE 9 MG/ML
3 INJECTION INTRAVENOUS
Status: DISCONTINUED | OUTPATIENT
Start: 2025-02-12 | End: 2025-02-13 | Stop reason: HOSPADM

## 2025-02-12 RX ORDER — HEPARIN SODIUM 1000 [USP'U]/ML
2000 INJECTION, SOLUTION INTRAVENOUS; SUBCUTANEOUS EVERY 6 HOURS PRN
Status: DISCONTINUED | OUTPATIENT
Start: 2025-02-12 | End: 2025-02-13

## 2025-02-12 RX ORDER — CLOPIDOGREL BISULFATE 75 MG/1
75 TABLET ORAL DAILY
Status: DISCONTINUED | OUTPATIENT
Start: 2025-02-13 | End: 2025-02-13 | Stop reason: HOSPADM

## 2025-02-12 RX ORDER — ATORVASTATIN CALCIUM 40 MG/1
40 TABLET, FILM COATED ORAL
Status: DISCONTINUED | OUTPATIENT
Start: 2025-02-13 | End: 2025-02-13 | Stop reason: HOSPADM

## 2025-02-12 RX ORDER — ATORVASTATIN CALCIUM 40 MG/1
40 TABLET, FILM COATED ORAL
Status: DISCONTINUED | OUTPATIENT
Start: 2025-02-12 | End: 2025-02-12 | Stop reason: HOSPADM

## 2025-02-12 RX ORDER — LISINOPRIL 5 MG/1
5 TABLET ORAL DAILY
Status: DISCONTINUED | OUTPATIENT
Start: 2025-02-12 | End: 2025-02-12 | Stop reason: HOSPADM

## 2025-02-12 RX ORDER — NICOTINE 21 MG/24HR
21 PATCH, TRANSDERMAL 24 HOURS TRANSDERMAL ONCE
Status: DISCONTINUED | OUTPATIENT
Start: 2025-02-12 | End: 2025-02-12 | Stop reason: HOSPADM

## 2025-02-12 RX ORDER — HEPARIN SODIUM 10000 [USP'U]/100ML
3-20 INJECTION, SOLUTION INTRAVENOUS
Status: DISCONTINUED | OUTPATIENT
Start: 2025-02-12 | End: 2025-02-13

## 2025-02-12 RX ORDER — HEPARIN SODIUM 1000 [USP'U]/ML
4000 INJECTION, SOLUTION INTRAVENOUS; SUBCUTANEOUS EVERY 6 HOURS PRN
Status: DISCONTINUED | OUTPATIENT
Start: 2025-02-12 | End: 2025-02-13

## 2025-02-12 RX ADMIN — HEPARIN SODIUM 15.1 UNITS/KG/HR: 10000 INJECTION, SOLUTION INTRAVENOUS at 12:52

## 2025-02-12 RX ADMIN — METOPROLOL SUCCINATE 25 MG: 25 TABLET, EXTENDED RELEASE ORAL at 08:20

## 2025-02-12 RX ADMIN — HEPARIN SODIUM 15.1 UNITS/KG/HR: 10000 INJECTION, SOLUTION INTRAVENOUS at 21:25

## 2025-02-12 RX ADMIN — ASPIRIN 81 MG 81 MG: 81 TABLET ORAL at 08:20

## 2025-02-12 RX ADMIN — LISINOPRIL 5 MG: 5 TABLET ORAL at 08:20

## 2025-02-12 RX ADMIN — NICOTINE 21 MG: 21 PATCH, EXTENDED RELEASE TRANSDERMAL at 16:50

## 2025-02-12 RX ADMIN — CLOPIDOGREL 75 MG: 75 TABLET ORAL at 08:20

## 2025-02-12 RX ADMIN — HEPARIN SODIUM 4000 UNITS: 1000 INJECTION INTRAVENOUS; SUBCUTANEOUS at 00:55

## 2025-02-12 RX ADMIN — ATORVASTATIN CALCIUM 40 MG: 40 TABLET, FILM COATED ORAL at 15:47

## 2025-02-12 NOTE — LETTER
[facility]  [address]  [place] [address]  Dept: [phone]    February 13, 2025     Patient: Jn Farfan   YOB: 1970   Date of Visit: 2/12/2025       To Whom it May Concern:    Jn Farfan is under my professional care. He was seen in the hospital from 2/12/2025 to 02/13/25. He may return to work on 2/17/25 without limitations.    If you have any questions or concerns, please don't hesitate to call.         Sincerely,          Martine Reyes PA-C

## 2025-02-12 NOTE — CONSULTS
Interprofessional consult, completed, medications reviewed, plan of care reviewed, no new management recommendations per internal medicine.    Continue medical management for suspected type I NSTEMI per cardiology.    Patient is on appropriate medical therapy    If patient does not have a bed available tonight, can admit to [facility] for continued medical management with plan for transfer when bed is available at [place].

## 2025-02-12 NOTE — PROGRESS NOTES
Cardiology Progress Note - Jn Farfan 54 y.o. male MRN: [id number]    Unit/Bed#: RM04 Encounter: [id number]      Assessment:  Chest pain with elevated troponin  Symptoms concerning for type 1 non-STEMI  Awaiting transfer to cath capable facility  On DAPT at baseline along with beta-blocker and statin  Remains on IV heparin  Updated echocardiogram requested  CAD s/p ALVARADO to LAD with residual LCx disease  Status post ALVARADO to mid LAD with known residual circumflex disease  Continue DAPT, aspirin and statin  No recent lipids, lipid panel requested  Preserved LV systolic function   Updated echo requested  Dyslipidemia  Continue statin therapy, lipid panel requested  5.   Benign essential hypertension    a.  Resume oral beta-blocker and lisinopril   6.   Tobacco abuse    a.  Smoking cessation imperative and discussed    Subjective:   Patient seen and examined.  No significant events overnight.  No further chest pain.    Objective:     Vitals: Blood pressure 132/86, pulse 71, temperature 98.1 °F (36.7 °C), temperature source Temporal, resp. rate (!) 25, weight 131 kg (288 lb 5.8 oz), SpO2 94%., Body mass index is 41.38 kg/m².,   Orthostatic Blood Pressures      Flowsheet Row Most Recent Value   Blood Pressure 132/86 filed at 02/12/2025 0700   Patient Position - Orthostatic VS Lying filed at 02/12/2025 0700            No intake or output data in the 24 hours ending 02/12/25 0721      Physical Exam:    GEN: Jn Farfan appears well, alert and oriented x 3, pleasant and cooperative   HEENT: pupils equal, round, and reactive to light; extraocular muscles intact  NECK: supple, no carotid bruits   HEART: regular rhythm, normal S1 and S2, no murmur  LUNGS: clear to auscultation bilaterally; no wheezes, rales, or rhonchi   ABDOMEN: normal bowel sounds, soft, no tenderness, no distention  EXTREMITIES: peripheral pulses normal; no clubbing, cyanosis, or edema  NEURO: no focal findings   SKIN: normal without suspicious lesions on  exposed skin    Medications:      Current Facility-Administered Medications:     aspirin chewable tablet 81 mg, 81 mg, Oral, Daily, Danya Winston PA-C    clopidogrel (PLAVIX) tablet 75 mg, 75 mg, Oral, Daily, Danya Winston PA-C    heparin (porcine) 25,000 units in 0.45% NaCl 250 mL infusion (premix), 3-20 Units/kg/hr (Order-Specific), Intravenous, Titrated, Danya Winston PA-C, Last Rate: 13.6 mL/hr at 02/12/25 0054, 15.1 Units/kg/hr at 02/12/25 0054    heparin (porcine) injection 2,000 Units, 2,000 Units, Intravenous, Q6H PRN, Danya Winston PA-C    heparin (porcine) injection 4,000 Units, 4,000 Units, Intravenous, Q6H PRN, Danya Winston PA-C, 4,000 Units at 02/12/25 0055    nicotine (NICODERM CQ) 21 mg/24 hr TD 24 hr patch 21 mg, 21 mg, Transdermal, Once, Jarett Moreno, , 21 mg at 02/11/25 1436    Insert peripheral IV, , , Once **AND** sodium chloride (PF) 0.9 % injection 3 mL, 3 mL, Intravenous, Q1H PRN, Jarett Moreno DO    Current Outpatient Medications:     aspirin (Aspirin Low Dose) 81 mg EC tablet, Take 1 tablet (81 mg total) by mouth daily, Disp: 30 tablet, Rfl: 6    atorvastatin (LIPITOR) 40 mg tablet, Take 1 tablet (40 mg total) by mouth daily with dinner, Disp: 30 tablet, Rfl: 6    clopidogrel (PLAVIX) 75 mg tablet, Take 1 tablet (75 mg total) by mouth daily, Disp: 30 tablet, Rfl: 6    lisinopril (ZESTRIL) 5 mg tablet, Take 1 tablet (5 mg total) by mouth daily, Disp: 30 tablet, Rfl: 6    metoprolol succinate (TOPROL-XL) 25 mg 24 hr tablet, Take 1 tablet (25 mg total) by mouth daily, Disp: 30 tablet, Rfl: 6    nitroglycerin (NITROSTAT) 0.4 mg SL tablet, Place 1 tablet (0.4 mg total) under the tongue every 5 (five) minutes as needed for chest pain, Disp: 30 tablet, Rfl: 0    Respiratory Therapy Supplies (Spirometer) KIT, Use 4 (four) times a day (Patient not taking: Reported on 1/9/2025), Disp: 1 kit, Rfl: 0     Labs & Results:        Results from last 7 days   Lab  Units 02/11/25  1821 02/11/25  1019   WBC Thousand/uL 8.60 7.48   HEMOGLOBIN g/dL 16.7 16.8   HEMATOCRIT % 47.8 47.8   PLATELETS Thousands/uL 206 187         Results from last 7 days   Lab Units 02/11/25  1019   POTASSIUM mmol/L 4.1   CHLORIDE mmol/L 102   CO2 mmol/L 24   BUN mg/dL 16   CREATININE mg/dL 0.86   CALCIUM mg/dL 9.3   ALK PHOS U/L 79   ALT U/L 24   AST U/L 19     Results from last 7 days   Lab Units 02/12/25  0023 02/11/25  1821   INR   --  0.91   PTT seconds 42* 26               Counseling / Coordination of Care  Total floor / unit time spent today 25 minutes.  Greater than 50% of total time was spent with the patient and / or family counseling and / or coordination of care.

## 2025-02-12 NOTE — ED NOTES
Pt up out of bed to ambulate to the bathroom.  Dinner tray given.     Mandi Olivas RN  02/12/25 6412

## 2025-02-13 VITALS
HEIGHT: 70 IN | HEART RATE: 71 BPM | OXYGEN SATURATION: 94 % | TEMPERATURE: 97.9 F | DIASTOLIC BLOOD PRESSURE: 56 MMHG | BODY MASS INDEX: 40.11 KG/M2 | WEIGHT: 280.2 LBS | SYSTOLIC BLOOD PRESSURE: 101 MMHG | RESPIRATION RATE: 17 BRPM

## 2025-02-13 LAB
ALBUMIN SERPL BCG-MCNC: 4.1 G/DL (ref 3.5–5)
ALP SERPL-CCNC: 65 U/L (ref 34–104)
ALT SERPL W P-5'-P-CCNC: 29 U/L (ref 7–52)
ANION GAP SERPL CALCULATED.3IONS-SCNC: 12 MMOL/L (ref 4–13)
APTT PPP: 56 SECONDS (ref 23–34)
AST SERPL W P-5'-P-CCNC: 25 U/L (ref 13–39)
ATRIAL RATE: 72 BPM
ATRIAL RATE: 75 BPM
ATRIAL RATE: 86 BPM
BASOPHILS # BLD AUTO: 0.04 THOUSANDS/ÂΜL (ref 0–0.1)
BASOPHILS NFR BLD AUTO: 1 % (ref 0–1)
BILIRUB SERPL-MCNC: 0.62 MG/DL (ref 0.2–1)
BUN SERPL-MCNC: 15 MG/DL (ref 5–25)
CALCIUM SERPL-MCNC: 8.9 MG/DL (ref 8.4–10.2)
CHLORIDE SERPL-SCNC: 100 MMOL/L (ref 96–108)
CO2 SERPL-SCNC: 21 MMOL/L (ref 21–32)
CREAT SERPL-MCNC: 0.8 MG/DL (ref 0.6–1.3)
EOSINOPHIL # BLD AUTO: 0.25 THOUSAND/ÂΜL (ref 0–0.61)
EOSINOPHIL NFR BLD AUTO: 4 % (ref 0–6)
ERYTHROCYTE [DISTWIDTH] IN BLOOD BY AUTOMATED COUNT: 12.7 % (ref 11.6–15.1)
GFR SERPL CREATININE-BSD FRML MDRD: 102 ML/MIN/1.73SQ M
GLUCOSE SERPL-MCNC: 97 MG/DL (ref 65–140)
HCT VFR BLD AUTO: 45.5 % (ref 36.5–49.3)
HGB BLD-MCNC: 15.6 G/DL (ref 12–17)
IMM GRANULOCYTES # BLD AUTO: 0.03 THOUSAND/UL (ref 0–0.2)
IMM GRANULOCYTES NFR BLD AUTO: 1 % (ref 0–2)
LYMPHOCYTES # BLD AUTO: 2.05 THOUSANDS/ÂΜL (ref 0.6–4.47)
LYMPHOCYTES NFR BLD AUTO: 33 % (ref 14–44)
MAGNESIUM SERPL-MCNC: 2 MG/DL (ref 1.9–2.7)
MCH RBC QN AUTO: 30.6 PG (ref 26.8–34.3)
MCHC RBC AUTO-ENTMCNC: 34.3 G/DL (ref 31.4–37.4)
MCV RBC AUTO: 89 FL (ref 82–98)
MONOCYTES # BLD AUTO: 0.46 THOUSAND/ÂΜL (ref 0.17–1.22)
MONOCYTES NFR BLD AUTO: 7 % (ref 4–12)
NEUTROPHILS # BLD AUTO: 3.41 THOUSANDS/ÂΜL (ref 1.85–7.62)
NEUTS SEG NFR BLD AUTO: 54 % (ref 43–75)
NRBC BLD AUTO-RTO: 0 /100 WBCS
P AXIS: 51 DEGREES
P AXIS: 52 DEGREES
P AXIS: 61 DEGREES
PHOSPHATE SERPL-MCNC: 3.6 MG/DL (ref 2.7–4.5)
PLATELET # BLD AUTO: 170 THOUSANDS/UL (ref 149–390)
PMV BLD AUTO: 9.6 FL (ref 8.9–12.7)
POTASSIUM SERPL-SCNC: 4 MMOL/L (ref 3.5–5.3)
PR INTERVAL: 146 MS
PR INTERVAL: 152 MS
PR INTERVAL: 156 MS
PROT SERPL-MCNC: 6.7 G/DL (ref 6.4–8.4)
QRS AXIS: 43 DEGREES
QRS AXIS: 49 DEGREES
QRS AXIS: 53 DEGREES
QRSD INTERVAL: 102 MS
QRSD INTERVAL: 96 MS
QRSD INTERVAL: 96 MS
QT INTERVAL: 354 MS
QT INTERVAL: 382 MS
QT INTERVAL: 388 MS
QTC INTERVAL: 418 MS
QTC INTERVAL: 423 MS
QTC INTERVAL: 433 MS
RBC # BLD AUTO: 5.1 MILLION/UL (ref 3.88–5.62)
SODIUM SERPL-SCNC: 133 MMOL/L (ref 135–147)
T WAVE AXIS: 58 DEGREES
T WAVE AXIS: 62 DEGREES
T WAVE AXIS: 76 DEGREES
VENTRICULAR RATE: 72 BPM
VENTRICULAR RATE: 75 BPM
VENTRICULAR RATE: 86 BPM
WBC # BLD AUTO: 6.24 THOUSAND/UL (ref 4.31–10.16)

## 2025-02-13 PROCEDURE — 80053 COMPREHEN METABOLIC PANEL: CPT | Performed by: FAMILY MEDICINE

## 2025-02-13 PROCEDURE — 85025 COMPLETE CBC W/AUTO DIFF WBC: CPT | Performed by: FAMILY MEDICINE

## 2025-02-13 PROCEDURE — 93454 CORONARY ARTERY ANGIO S&I: CPT | Performed by: INTERNAL MEDICINE

## 2025-02-13 PROCEDURE — C1894 INTRO/SHEATH, NON-LASER: HCPCS | Performed by: INTERNAL MEDICINE

## 2025-02-13 PROCEDURE — 99233 SBSQ HOSP IP/OBS HIGH 50: CPT | Performed by: INTERNAL MEDICINE

## 2025-02-13 PROCEDURE — 99152 MOD SED SAME PHYS/QHP 5/>YRS: CPT | Performed by: INTERNAL MEDICINE

## 2025-02-13 PROCEDURE — C1769 GUIDE WIRE: HCPCS | Performed by: INTERNAL MEDICINE

## 2025-02-13 PROCEDURE — 99238 HOSP IP/OBS DSCHRG MGMT 30/<: CPT | Performed by: PHYSICIAN ASSISTANT

## 2025-02-13 PROCEDURE — B2111ZZ FLUOROSCOPY OF MULTIPLE CORONARY ARTERIES USING LOW OSMOLAR CONTRAST: ICD-10-PCS | Performed by: INTERNAL MEDICINE

## 2025-02-13 PROCEDURE — 84100 ASSAY OF PHOSPHORUS: CPT | Performed by: FAMILY MEDICINE

## 2025-02-13 PROCEDURE — 85730 THROMBOPLASTIN TIME PARTIAL: CPT | Performed by: FAMILY MEDICINE

## 2025-02-13 PROCEDURE — 83735 ASSAY OF MAGNESIUM: CPT | Performed by: FAMILY MEDICINE

## 2025-02-13 PROCEDURE — 99153 MOD SED SAME PHYS/QHP EA: CPT | Performed by: INTERNAL MEDICINE

## 2025-02-13 PROCEDURE — 93010 ELECTROCARDIOGRAM REPORT: CPT | Performed by: INTERNAL MEDICINE

## 2025-02-13 RX ORDER — NITROGLYCERIN 20 MG/100ML
INJECTION INTRAVENOUS CODE/TRAUMA/SEDATION MEDICATION
Status: DISCONTINUED | OUTPATIENT
Start: 2025-02-13 | End: 2025-02-13 | Stop reason: HOSPADM

## 2025-02-13 RX ORDER — HEPARIN SODIUM 1000 [USP'U]/ML
INJECTION, SOLUTION INTRAVENOUS; SUBCUTANEOUS CODE/TRAUMA/SEDATION MEDICATION
Status: DISCONTINUED | OUTPATIENT
Start: 2025-02-13 | End: 2025-02-13 | Stop reason: HOSPADM

## 2025-02-13 RX ORDER — LIDOCAINE HYDROCHLORIDE 10 MG/ML
INJECTION, SOLUTION EPIDURAL; INFILTRATION; INTRACAUDAL; PERINEURAL CODE/TRAUMA/SEDATION MEDICATION
Status: DISCONTINUED | OUTPATIENT
Start: 2025-02-13 | End: 2025-02-13 | Stop reason: HOSPADM

## 2025-02-13 RX ORDER — VERAPAMIL HCL 2.5 MG/ML
AMPUL (ML) INTRAVENOUS CODE/TRAUMA/SEDATION MEDICATION
Status: DISCONTINUED | OUTPATIENT
Start: 2025-02-13 | End: 2025-02-13 | Stop reason: HOSPADM

## 2025-02-13 RX ORDER — FENTANYL CITRATE 50 UG/ML
INJECTION, SOLUTION INTRAMUSCULAR; INTRAVENOUS CODE/TRAUMA/SEDATION MEDICATION
Status: DISCONTINUED | OUTPATIENT
Start: 2025-02-13 | End: 2025-02-13 | Stop reason: HOSPADM

## 2025-02-13 RX ORDER — SODIUM CHLORIDE 9 MG/ML
INJECTION, SOLUTION INTRAVENOUS
Status: DISCONTINUED | OUTPATIENT
Start: 2025-02-13 | End: 2025-02-13

## 2025-02-13 RX ORDER — ESCITALOPRAM OXALATE 10 MG/1
10 TABLET ORAL DAILY
Qty: 30 TABLET | Refills: 0 | Status: SHIPPED | OUTPATIENT
Start: 2025-02-13

## 2025-02-13 RX ORDER — ASPIRIN 81 MG/1
324 TABLET, CHEWABLE ORAL DAILY
Status: DISCONTINUED | OUTPATIENT
Start: 2025-02-14 | End: 2025-02-13

## 2025-02-13 RX ORDER — MIDAZOLAM HYDROCHLORIDE 2 MG/2ML
INJECTION, SOLUTION INTRAMUSCULAR; INTRAVENOUS CODE/TRAUMA/SEDATION MEDICATION
Status: DISCONTINUED | OUTPATIENT
Start: 2025-02-13 | End: 2025-02-13 | Stop reason: HOSPADM

## 2025-02-13 RX ORDER — ASPIRIN 81 MG/1
81 TABLET, CHEWABLE ORAL DAILY
Status: DISCONTINUED | OUTPATIENT
Start: 2025-02-14 | End: 2025-02-13 | Stop reason: HOSPADM

## 2025-02-13 RX ORDER — SODIUM CHLORIDE 9 MG/ML
100 INJECTION, SOLUTION INTRAVENOUS CONTINUOUS
Status: DISPENSED | OUTPATIENT
Start: 2025-02-13 | End: 2025-02-13

## 2025-02-13 RX ADMIN — ASPIRIN 81 MG CHEWABLE TABLET 81 MG: 81 TABLET CHEWABLE at 08:43

## 2025-02-13 RX ADMIN — CLOPIDOGREL BISULFATE 75 MG: 75 TABLET ORAL at 08:43

## 2025-02-13 RX ADMIN — SODIUM CHLORIDE 100 ML/HR: 0.9 INJECTION, SOLUTION INTRAVENOUS at 11:30

## 2025-02-13 RX ADMIN — LISINOPRIL 5 MG: 5 TABLET ORAL at 08:43

## 2025-02-13 RX ADMIN — METOPROLOL SUCCINATE 25 MG: 25 TABLET, EXTENDED RELEASE ORAL at 08:43

## 2025-02-13 NOTE — ASSESSMENT & PLAN NOTE
Presented with chest pain, radiating to jaw/arm, sx were similar to prior MI in past  With elevated troponins and symptoms, there was concern for possible NSTEMI/unstable angina and he was started on heparin gtt and transferred to \[facility]\"" for cardiac cath  Cardiac cath on 2/13 with stable CAD, no evidence for plaque rupture.  NSTEMI has been ruled out.  Suspect sx could have been in setting of microvascular disease/spasm.  Continue BB, DAPT, statin as previously prescribeed  D/w cardiology, stable for discharge with outpt f/u

## 2025-02-13 NOTE — DISCHARGE INSTR - AVS FIRST PAGE
1. Please see the post cardiac catheterization dishcarge instructions.   No heavy lifting, greater than 10 lbs. or strenuous  activity for 48 hrs.    2.Remove band aid tomorrow.  Shower and wash area- wrist gently with soap and water- beginning tomorrow. Rinse and pat dry.  Apply new water seal band aid.  Repeat this process for 5 days. No powders, creams lotions or antibiotic ointments  for 5 days.  No tub baths, hot tubs or swimming for 5 days.     3. Please call our office ([phone]) if you have any fever, redness, swelling, discharge from your wrist access site.    4.No driving for 1 day

## 2025-02-13 NOTE — DISCHARGE SUMMARY
Discharge Summary - Hospitalist   Name: Jn Farfan 54 y.o. male I MRN: [id number]  Unit/Bed#: [facility] 423-01 I Date of Admission: 2/12/2025   Date of Service: 2/13/2025 I Hospital Day: 1     Assessment & Plan  Chest pain  Presented with chest pain, radiating to jaw/arm, sx were similar to prior MI in past  With elevated troponins and symptoms, there was concern for possible NSTEMI/unstable angina and he was started on heparin gtt and transferred to [facility] for cardiac cath  Cardiac cath on 2/13 with stable CAD, no evidence for plaque rupture.  NSTEMI has been ruled out.  Suspect sx could have been in setting of microvascular disease/spasm.  Continue BB, DAPT, statin as previously prescribeed  D/w cardiology, stable for discharge with outpt f/u   Tobacco user  NRT  Dyslipidemia  Continue Atorvastatin 40  Morbid obesity with BMI of 40.0-44.9, adult (HCC)  Obesity complicating multiple facets of health  Anxiety and depression  Reports previously was on Lexapro, but stopped a few years ago as he was lost to f/u with PCP. He notes sx noted in primary problem could be related to his underlying anxiety  Will rx lexapro, he was instructed to f/u with PCP for further titration/adjustment.  He was counseled that may take several weeks to achieve benefit from medications.      Medical Problems       Resolved Problems  Date Reviewed: 5/15/2024   None       Discharging Physician / Practitioner: Martine Reyes PA-C  PCP: Franki Cook MD  Admission Date:   Admission Orders (From admission, onward)       Ordered        02/12/25 2116  INPATIENT ADMISSION  Once                          Discharge Date: 02/13/25    Consultations During Hospital Stay:  Cardiology     Procedures Performed:   2/13 [facility]     Significant Findings / Test Results:   Elevated troponin  CXR no acute findings   [facility] stable CAD     Incidental Findings:   None    Test Results Pending at Discharge (will require follow up):   None     Outpatient Tests  "Requested:  None    Complications:  none    Reason for Admission: chest pain/elevated troponin     Hospital Course:   Jn Farfan is a 54 y.o. male patient who originally presented to the hospital on 2/12/2025 due to chest pain.  Radiated to jaw/arm.  He has hx of MI in past.  ED evaluation revealed normal CXR, elevated troponin.  With his sx, cardiac hx and elevated troponin he was started on heparin gtt and transferred to [facility] for cardiac cath.  Cath on 2/13 with stable CAD, NSTEMI ruled out.   He has no complaints presently.  D/w cardiology and he is stable for discharge.  We discussed starting back on Lexapro which he took many years ago for anxiety/depression, as he believes some of his sx could be related to his mental health, prescribed at d/c.  He was instructed to follow up with pcp for further management and he can expect medications to take a few weeks to work.     Please see above list of diagnoses and related plan for additional information.     Condition at Discharge: good    Discharge Day Visit / Exam:   Subjective:  No complaints, denies any pain, sob.  He is eating lunch.  He is interested in going back on Lexapro.    Vitals: Blood Pressure: 101/56 (02/13/25 1137)  Pulse: 71 (02/13/25 1137)  Temperature: 97.9 °F (36.6 °C) (02/13/25 1137)  Temp Source: Oral (02/13/25 0211)  Respirations: 17 (02/13/25 1137)  Height: 5' 10\" (177.8 cm) (02/12/25 2123)  Weight - Scale: 127 kg (280 lb 3.3 oz) (02/12/25 2123)  SpO2: 94 % (02/13/25 1137)  Physical Exam  Vitals reviewed.   Constitutional:       General: He is not in acute distress.     Appearance: He is not toxic-appearing.   HENT:      Head: Normocephalic and atraumatic.   Eyes:      Extraocular Movements: Extraocular movements intact.   Cardiovascular:      Rate and Rhythm: Normal rate.   Pulmonary:      Effort: Pulmonary effort is normal. No respiratory distress.   Musculoskeletal:         General: Normal range of motion.   Neurological:      General: No " focal deficit present.      Mental Status: He is alert and oriented to person, place, and time.   Psychiatric:         Mood and Affect: Mood normal.         Behavior: Behavior normal.         Thought Content: Thought content normal.          Discussion with Family: Patient declined call to .     Discharge instructions/Information to patient and family:   See after visit summary for information provided to patient and family.      Provisions for Follow-Up Care:  See after visit summary for information related to follow-up care and any pertinent home health orders.      Mobility at time of Discharge:   Basic Mobility Inpatient Raw Score: 24  JH-HLM Goal: 8: Walk 250 feet or more  JH-HLM Achieved: 8: Walk 250 feet ot more  HLM Goal achieved. Continue to encourage appropriate mobility.     Disposition:   Home    Planned Readmission: no    Discharge Medications:  See after visit summary for reconciled discharge medications provided to patient and/or family.      Administrative Statements   Discharge Statement:  I have spent a total time of 28 minutes in caring for this patient on the day of the visit/encounter.     **Please Note: This note may have been constructed using a voice recognition system**

## 2025-02-13 NOTE — UTILIZATION REVIEW
NOTIFICATION OF INPATIENT ADMISSION      AUTHORIZATION REQUEST   SERVICING FACILITY:   [facility]  Address: [address] [place] [address]  Tax ID: [id number]  NPI: [id number] ATTENDING PROVIDER:  Attending Name and NPI#: Rama Meng Md [[id number]]  Address: [address] [place] [address]  Phone: [phone]   ADMISSION INFORMATION:  Place of Service: Inpatient [facility]  Place of Service Code: 21  Inpatient Admission Date/Time: 2/12/25  9:13 PM  Discharge Date/Time: No discharge date for patient encounter.  Admitting Diagnosis Code/Description:  Chest pain [R07.9]     UTILIZATION REVIEW CONTACT:  Nano Monsivais, Utilization   Network Utilization Review Department  Phone: [phone]  Fax: [phone]  Email: [email or web address]  Contact for approvals/pending authorizations, clinical reviews, and discharge.     PHYSICIAN ADVISORY SERVICES:  Medical Necessity Denial & Ygiq-bc-Jctv Review  Phone: [phone]  Fax: [phone]  Email: [email or web address]     DISCHARGE SUPPORT TEAM:  For Patients Discharge Needs & Updates  Phone: [phone] opt. [phone] Fax: [phone]  Email: [email or web address]

## 2025-02-13 NOTE — UTILIZATION REVIEW
Initial Clinical Review    Admission: Date/Time/Statement:   Admission Orders (From admission, onward)       Ordered        02/12/25 2116  INPATIENT ADMISSION  Once                          Orders Placed This Encounter   Procedures    INPATIENT ADMISSION     Standing Status:   Standing     Number of Occurrences:   1     Level of Care:   Med Surg [16]     Estimated length of stay:   More than 2 Midnights     Certification:   I certify that inpatient services are medically necessary for this patient for a duration of greater than two midnights. See H&P and MD Progress Notes for additional information about the patient's course of treatment.     Initial Presentation: 54 y.o. male transferred to \[facility]"" from [facility]'s ED where he initially presented with c/o chest pain, noted with elevated troponins concerning for NSTEMI. Started on Hep gtt and transferred to \[facility]\"" for further tx and cardiac cath. PMHx: CAD s/p prior STEMI and ALVARADO to LAD, HTN, HLD, tobacco use, anxiety/depression   Admitted Inpatient to MS/Tele unit with Chest pain with elevated troponin: concerning for ACS   Pt currently chest pain-free and hemodynamically stable. Telemetry. Continue IV heparin, aspirin & plavix, on chronic DAPT pre-hospital. Increase lipitor to 80 mg. Continue metoprolol. Plan for [facility] today. SCDs.    Date: 2/13   Day 2: Cardiac cath revealed stable CAD and no evidence of plaque rupture, so this may be related to microvascular disease/spasm, but no intervention required. Continue medical management with DAPT, statin, and B-blocker. D/c Hep gtt. Continue beta blocker, ACEi, statin. Encouraged for smoking cessation.     Scheduled Medications:  [START ON 2/14/2025] aspirin, 81 mg, Oral, Daily  atorvastatin, 40 mg, Oral, Daily With Dinner  clopidogrel, 75 mg, Oral, Daily  lisinopril, 5 mg, Oral, Daily  metoprolol succinate, 25 mg, Oral, Daily    Continuous IV Infusions:  sodium chloride, 100 mL/hr, Intravenous, Continuous    PRN  Meds:      Weight (last 2 days)       Date/Time Weight    02/12/25 2123 127 (280.2)            Vital Signs (last 3 days)       Date/Time Temp Pulse Resp BP MAP (mmHg) SpO2 Calculated FIO2 (%) - Nasal Cannula Nasal Cannula O2 Flow Rate (L/min) O2 Device Patient Position - Orthostatic VS [place] Coma Scale Score Pain    02/13/25 11:37:47 97.9 °F (36.6 °C) 71 17 101/56 -- 94 % -- -- -- -- -- --    02/13/25 10:29:56 -- -- -- -- -- -- -- -- -- -- -- No Pain    02/13/25 10:10:57 -- -- -- -- -- -- -- -- -- -- -- No Pain    02/13/25 10:10:27 -- -- -- -- -- -- 28 2 L/min Nasal cannula -- -- --    02/13/25 0957 -- -- -- -- -- -- -- -- -- -- -- No Pain    02/13/25 08:04:35 98.2 °F (36.8 °C) 72 18 121/80 -- 90 % -- -- -- -- -- --    02/13/25 02:11:09 98.4 °F (36.9 °C) 77 -- 132/78 98 94 % -- -- None (Room air) Lying -- --    02/12/25 23:49:46 98.2 °F (36.8 °C) 75 16 118/73 88 94 % -- -- None (Room air) Lying -- --    02/12/25 23:48:39 98.2 °F (36.8 °C) 73 -- -- -- 93 % -- -- -- -- -- --    02/12/25 2125 -- -- -- -- -- -- -- -- -- -- 15 No Pain    02/12/25 2123 99 °F (37.2 °C) 74 16 123/80 74 95 % -- -- None (Room air) -- -- No Pain       Pertinent Labs/Diagnostic Test Results:   Radiology:  X-ray chest 1 view portable 2/11:  No focal consolidation, pleural effusion, or pneumothorax.     Cardiology:  Cardiac catheterization   Final Result by Juan Diego Dalal MD (02/13 1218)        Mid RCA lesion is 50% stenosed.      Patient with known coronary disease.  Pain at the base of the tongue and    stabbing pain of the left arm.   Nonobstructive plaque.  The stent previously implanted in the LAD remains    widely patent.  Noncardiac chest pain.               Results from last 7 days   Lab Units 02/13/25  0456 02/11/25  1821 02/11/25  1019   WBC Thousand/uL 6.24 8.60 7.48   HEMOGLOBIN g/dL 15.6 16.7 16.8   HEMATOCRIT % 45.5 47.8 47.8   PLATELETS Thousands/uL 170 206 187   TOTAL NEUT ABS Thousands/µL 3.41  --  5.05     Results from last 7  days   Lab Units 02/13/25  0456 02/11/25  1019   SODIUM mmol/L 133* 135   POTASSIUM mmol/L 4.0 4.1   CHLORIDE mmol/L 100 102   CO2 mmol/L 21 24   ANION GAP mmol/L 12 9   BUN mg/dL 15 16   CREATININE mg/dL 0.80 0.86   EGFR ml/min/1.73sq m 102 99   CALCIUM mg/dL 8.9 9.3   MAGNESIUM mg/dL 2.0  --    PHOSPHORUS mg/dL 3.6  --      Results from last 7 days   Lab Units 02/13/25  0456 02/11/25  1019   AST U/L 25 19   ALT U/L 29 24   ALK PHOS U/L 65 79   TOTAL PROTEIN g/dL 6.7 7.2   ALBUMIN g/dL 4.1 4.6   TOTAL BILIRUBIN mg/dL 0.62 0.75       Results from last 7 days   Lab Units 02/13/25  0456 02/11/25  1019   GLUCOSE RANDOM mg/dL 97 153*     Results from last 7 days   Lab Units 02/11/25  1427 02/11/25  1218 02/11/25  1019   HS TNI 0HR ng/L  --   --  4   HS TNI 2HR ng/L  --  77*  --    HSTNI D2 ng/L  --  73*  --    HS TNI 4HR ng/L 422*  --   --    HSTNI D4 ng/L 418*  --   --      Results from last 7 days   Lab Units 02/13/25  0456 02/12/25  1251 02/12/25  0652 02/12/25  0023 02/11/25  1821   PROTIME seconds  --   --   --   --  12.7   INR   --   --   --   --  0.91   PTT seconds 56* 63* 65*   < > 26    < > = values in this interval not displayed.     Results from last 7 days   Lab Units 02/11/25  1019   BNP pg/mL 13     Results from last 7 days   Lab Units 02/11/25  1019   LIPASE u/L 39       Past Medical History:   Diagnosis Date    Heart attack (HCC) 06/28/2021     Present on Admission:   Chest pain   (Resolved) Depression with anxiety   Dyslipidemia   Tobacco user      Admitting Diagnosis: Chest pain [R07.9]  Age/Sex: 54 y.o. male    Network Utilization Review Department  ATTENTION: Please call with any questions or concerns to [phone] and carefully listen to the prompts so that you are directed to the right person. All voicemails are confidential.   For Discharge needs, contact Care Management DC Support Team at [phone] opt. 2  Send all requests for admission clinical reviews, approved or denied determinations  and any other requests to dedicated fax number below belonging to the campus where the patient is receiving treatment. List of dedicated fax numbers for the Facilities:  FACILITY NAME UR FAX NUMBER   ADMISSION DENIALS (Administrative/Medical Necessity) [phone]   DISCHARGE SUPPORT TEAM (NETWORK) [phone]   PARENT CHILD HEALTH (Maternity/NICU/Pediatrics) [phone]   [facility] [phone]   [facility] [phone]   [facility] [phone]   [facility] [phone]   [facility] [phone]   [facility] [phone]   [facility] [phone]   [facility] [phone]   [facility] [phone]   [facility] [phone]   [facility] [phone]   [facility] [phone]

## 2025-02-13 NOTE — ED NOTES
[facility] here for patient  Care transferred to Paramedic and report called to number provided     Mandi Olivas RN  02/12/25 5754

## 2025-02-13 NOTE — H&P
H&P - Hospitalist   Name: Jn Farfan 54 y.o. male I MRN: [id number]  Unit/Bed#: [facility] 423-01 I Date of Admission: 2/12/2025   Date of Service: 2/12/2025 I Hospital Day: 0     Assessment & Plan  Chest pain  Consistent with Type 1 NSTEMI  Heparin drip  DAPT  Statin  BB  Cardiology consult  Tobacco user  NRT  Dyslipidemia  Continue Atorvastatin 40  Morbid obesity with BMI of 40.0-44.9, adult (HCC)  Obesity complicating multiple facets of health      VTE Pharmacologic Prophylaxis:   High Risk (Score >/= 5) - Pharmacological DVT Prophylaxis Ordered: heparin drip. Sequential Compression Devices Ordered.  Code Status: Prior   Discussion with family: Patient declined call to .     Anticipated Length of Stay: Patient will be admitted on an inpatient basis with an anticipated length of stay of greater than 2 midnights secondary to Type 1 NSTEMI.    History of Present Illness   Chief Complaint: Chest pain    Jn Farfan is a 54 y.o. male with a PMH of Tobacco, CAD use who presents with Chest pain. Pt transferred to [facility] for further evaluation by cardiology via Cardiac Cath. Pt denies any further complaints at this time.     Review of Systems   Constitutional:  Negative for chills and fever.   HENT:  Negative for ear pain and sore throat.    Eyes:  Negative for pain and visual disturbance.   Respiratory:  Negative for cough and shortness of breath.    Cardiovascular:  Positive for chest pain. Negative for palpitations.   Gastrointestinal:  Negative for abdominal pain and vomiting.   Genitourinary:  Negative for dysuria and hematuria.   Musculoskeletal:  Negative for arthralgias and back pain.   Skin:  Negative for color change and rash.   Neurological:  Negative for seizures and syncope.   All other systems reviewed and are negative.      Historical Information   Past Medical History:   Diagnosis Date    Heart attack (HCC) 06/28/2021     Past Surgical History:   Procedure Laterality Date    CORONARY ANGIOPLASTY       FINGER FRACTURE SURGERY      MOUTH SURGERY       Social History     Tobacco Use    Smoking status: Every Day     Current packs/day: 0.00     Average packs/day: 1 pack/day for 22.0 years (22.0 ttl pk-yrs)     Types: Cigarettes     Start date: 6/28/1999     Last attempt to quit: 6/28/2021     Years since quitting: 3.6    Smokeless tobacco: Never   Vaping Use    Vaping status: Every Day    Substances: Nicotine   Substance and Sexual Activity    Alcohol use: Not Currently    Drug use: Not Currently     Types: Marijuana     Comment: 2016 last used    Sexual activity: Yes     E-Cigarette/Vaping    E-Cigarette Use Current Every Day User     Comments Trying to wean off.  Down to 6mg      E-Cigarette/Vaping Substances    Nicotine Yes     THC No     CBD No     Flavoring No     Other No     Unknown No      Family history non-contributory  Social History:  Marital Status: Single     Meds/Allergies   I have reviewed home medications using recent Epic encounter.  Prior to Admission medications    Medication Sig Start Date End Date Taking? Authorizing Provider   aspirin (Aspirin Low Dose) 81 mg EC tablet Take 1 tablet (81 mg total) by mouth daily 5/21/24 2/11/25  Danya Winston PA-C   atorvastatin (LIPITOR) 40 mg tablet Take 1 tablet (40 mg total) by mouth daily with dinner 5/21/24 2/11/25  Danya Winston PA-C   clopidogrel (PLAVIX) 75 mg tablet Take 1 tablet (75 mg total) by mouth daily 5/21/24 2/11/25  Danya Winston PA-C   lisinopril (ZESTRIL) 5 mg tablet Take 1 tablet (5 mg total) by mouth daily 5/21/24 2/11/25  Danya Winston PA-C   metoprolol succinate (TOPROL-XL) 25 mg 24 hr tablet Take 1 tablet (25 mg total) by mouth daily 5/21/24 2/11/25  Danya Winston PA-C   nitroglycerin (NITROSTAT) 0.4 mg SL tablet Place 1 tablet (0.4 mg total) under the tongue every 5 (five) minutes as needed for chest pain 12/19/24   Danya Winston PA-C   Respiratory Therapy Supplies (Spirometer) KIT Use 4 (four) times a  day  Patient not taking: Reported on 1/9/2025 1/15/22   Johnny Bob III, DO     Allergies   Allergen Reactions    Bupropion Itching       Objective :  Temp:  [98 °F (36.7 °C)-99 °F (37.2 °C)] 99 °F (37.2 °C)  HR:  [66-77] 74  BP: (109-154)/(67-86) 123/80  Resp:  [16-25] 16  SpO2:  [90 %-95 %] 95 %  O2 Device: None (Room air)    Physical Exam  Vitals reviewed.   Constitutional:       General: He is not in acute distress.  HENT:      Head: Normocephalic.      Nose: No congestion.      Mouth/Throat:      Pharynx: No oropharyngeal exudate or posterior oropharyngeal erythema.   Eyes:      Conjunctiva/sclera: Conjunctivae normal.   Cardiovascular:      Rate and Rhythm: Normal rate and regular rhythm.   Pulmonary:      Effort: Pulmonary effort is normal.   Abdominal:      General: Abdomen is flat.      Palpations: Abdomen is soft.   Skin:     General: Skin is warm and dry.   Neurological:      Mental Status: He is alert and oriented to person, place, and time. Mental status is at baseline.          Lines/Drains:            Lab Results: I have reviewed the following results:  Results from last 7 days   Lab Units 02/11/25  1821 02/11/25  1019   WBC Thousand/uL 8.60 7.48   HEMOGLOBIN g/dL 16.7 16.8   HEMATOCRIT % 47.8 47.8   PLATELETS Thousands/uL 206 187   SEGS PCT %  --  67   LYMPHO PCT %  --  22   MONO PCT %  --  6   EOS PCT %  --  4     Results from last 7 days   Lab Units 02/11/25  1019   SODIUM mmol/L 135   POTASSIUM mmol/L 4.1   CHLORIDE mmol/L 102   CO2 mmol/L 24   BUN mg/dL 16   CREATININE mg/dL 0.86   ANION GAP mmol/L 9   CALCIUM mg/dL 9.3   ALBUMIN g/dL 4.6   TOTAL BILIRUBIN mg/dL 0.75   ALK PHOS U/L 79   ALT U/L 24   AST U/L 19   GLUCOSE RANDOM mg/dL 153*     Results from last 7 days   Lab Units 02/11/25  1821   INR  0.91         Lab Results   Component Value Date    HGBA1C 5.4 06/29/2021           Imaging Results Review: I reviewed radiology reports from this admission including: chest xray and  Echocardiogram.  Other Study Results Review: EKG was reviewed.     Administrative Statements   I have spent a total time of 80 minutes in caring for this patient on the day of the visit/encounter including Diagnostic results, Prognosis, Instructions for management, Importance of tx compliance, Risk factor reductions, Impressions, Documenting in the medical record, Obtaining or reviewing history  , and Communicating with other healthcare professionals .    ** Please Note: This note has been constructed using a voice recognition system. **

## 2025-02-13 NOTE — NURSING NOTE
Patient was discharged home, accompanied by his son, instructions were given to patient who verbalized understanding

## 2025-02-13 NOTE — ASSESSMENT & PLAN NOTE
Reports previously was on Lexapro, but stopped a few years ago as he was lost to f/u with PCP. He notes sx noted in primary problem could be related to his underlying anxiety  Will rx lexapro, he was instructed to f/u with PCP for further titration/adjustment.  He was counseled that may take several weeks to achieve benefit from medications.
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
## 2025-02-14 NOTE — UTILIZATION REVIEW
NOTIFICATION OF ADMISSION DISCHARGE   This is a Notification of Discharge from [facility]. Please be advised that this patient has been discharge from our facility. Below you will find the admission and discharge date and time including the patient’s disposition.   UTILIZATION REVIEW CONTACT:  Nano Monsivais  Utilization   Network Utilization Review Department  Phone: [phone] x carefully listen to the prompts. All voicemails are confidential.  Email: [email or web address]     ADMISSION INFORMATION  PRESENTATION DATE: 2/12/2025  9:13 PM  OBERVATION ADMISSION DATE: N/A  INPATIENT ADMISSION DATE: 2/12/25  9:13 PM   DISCHARGE DATE: 2/13/2025  2:56 PM   DISPOSITION:Home/Self Care    Network Utilization Review Department  ATTENTION: Please call with any questions or concerns to [phone] and carefully listen to the prompts so that you are directed to the right person. All voicemails are confidential.   For Discharge needs, contact Care Management DC Support Team at [phone] opt. [phone]  Send all requests for admission clinical reviews, approved or denied determinations and any other requests to dedicated fax number below belonging to the campus where the patient is receiving treatment. List of dedicated fax numbers for the Facilities:  FACILITY NAME UR FAX NUMBER   ADMISSION DENIALS (Administrative/Medical Necessity) [phone]   DISCHARGE SUPPORT TEAM ([facility]) [phone]   PARENT CHILD HEALTH (Maternity/NICU/Pediatrics) [phone]   [facility] [phone]   [facility] [phone]   [facility] [phone]   [facility] [phone]   [facility] [phone]   [facility] [phone]   [facility] [phone]   [facility] [phone]    [facility] [phone]   [facility] [phone]   [facility] [phone]   [facility] [phone]

## 2025-05-05 DIAGNOSIS — I21.02 ACUTE ST ELEVATION MYOCARDIAL INFARCTION (STEMI) DUE TO OCCLUSION OF MID PORTION OF LEFT ANTERIOR DESCENDING (LAD) CORONARY ARTERY (HCC): ICD-10-CM

## 2025-05-05 DIAGNOSIS — I21.9 TYPE 1 MYOCARDIAL INFARCTION (HCC): ICD-10-CM

## 2025-05-05 DIAGNOSIS — I25.10 CORONARY ARTERY DISEASE INVOLVING NATIVE HEART WITHOUT ANGINA PECTORIS, UNSPECIFIED VESSEL OR LESION TYPE: ICD-10-CM

## 2025-05-06 RX ORDER — CLOPIDOGREL BISULFATE 75 MG/1
75 TABLET ORAL DAILY
Qty: 30 TABLET | Refills: 5 | Status: SHIPPED | OUTPATIENT
Start: 2025-05-06

## 2025-05-06 RX ORDER — METOPROLOL SUCCINATE 25 MG/1
25 TABLET, EXTENDED RELEASE ORAL DAILY
Qty: 30 TABLET | Refills: 5 | Status: SHIPPED | OUTPATIENT
Start: 2025-05-06

## 2025-06-06 DIAGNOSIS — I21.9 TYPE 1 MYOCARDIAL INFARCTION (HCC): ICD-10-CM

## 2025-06-06 DIAGNOSIS — I21.02 ACUTE ST ELEVATION MYOCARDIAL INFARCTION (STEMI) DUE TO OCCLUSION OF MID PORTION OF LEFT ANTERIOR DESCENDING (LAD) CORONARY ARTERY (HCC): ICD-10-CM

## 2025-06-06 RX ORDER — ATORVASTATIN CALCIUM 40 MG/1
40 TABLET, FILM COATED ORAL
Qty: 30 TABLET | Refills: 5 | Status: SHIPPED | OUTPATIENT
Start: 2025-06-06

## 2025-06-06 RX ORDER — LISINOPRIL 5 MG/1
5 TABLET ORAL DAILY
Qty: 30 TABLET | Refills: 5 | Status: SHIPPED | OUTPATIENT
Start: 2025-06-06

## 2025-06-13 DIAGNOSIS — I21.9 TYPE 1 MYOCARDIAL INFARCTION (HCC): ICD-10-CM

## 2025-06-13 DIAGNOSIS — I21.02 ACUTE ST ELEVATION MYOCARDIAL INFARCTION (STEMI) DUE TO OCCLUSION OF MID PORTION OF LEFT ANTERIOR DESCENDING (LAD) CORONARY ARTERY (HCC): ICD-10-CM

## 2025-06-13 RX ORDER — HYDROGEN PEROXIDE 2.65 ML/100ML
81 LIQUID ORAL; TOPICAL DAILY
Qty: 30 TABLET | Refills: 5 | Status: SHIPPED | OUTPATIENT
Start: 2025-06-13

## 2025-08-05 ENCOUNTER — OFFICE VISIT (OUTPATIENT)
Age: 55
End: 2025-08-05

## 2025-08-05 VITALS
OXYGEN SATURATION: 97 % | BODY MASS INDEX: 42.33 KG/M2 | WEIGHT: 295 LBS | SYSTOLIC BLOOD PRESSURE: 126 MMHG | DIASTOLIC BLOOD PRESSURE: 80 MMHG | TEMPERATURE: 99.6 F | HEART RATE: 90 BPM | RESPIRATION RATE: 18 BRPM

## 2025-08-05 DIAGNOSIS — Z59.9 FINANCIAL DIFFICULTIES: ICD-10-CM

## 2025-08-05 DIAGNOSIS — Z12.12 SCREENING FOR COLORECTAL CANCER: ICD-10-CM

## 2025-08-05 DIAGNOSIS — F32.2 CURRENT SEVERE EPISODE OF MAJOR DEPRESSIVE DISORDER WITHOUT PSYCHOTIC FEATURES WITHOUT PRIOR EPISODE (HCC): ICD-10-CM

## 2025-08-05 DIAGNOSIS — Z12.11 SCREENING FOR COLORECTAL CANCER: ICD-10-CM

## 2025-08-05 DIAGNOSIS — F17.210 SMOKING GREATER THAN 20 PACK YEARS: ICD-10-CM

## 2025-08-05 DIAGNOSIS — Z12.11 SCREENING FOR COLON CANCER: ICD-10-CM

## 2025-08-05 DIAGNOSIS — R29.818 SUSPECTED SLEEP APNEA: ICD-10-CM

## 2025-08-05 DIAGNOSIS — Z13.6 SCREENING FOR CARDIOVASCULAR CONDITION: ICD-10-CM

## 2025-08-05 DIAGNOSIS — Z13.29 SCREENING FOR THYROID DISORDER: ICD-10-CM

## 2025-08-05 DIAGNOSIS — Z12.2 SCREENING FOR LUNG CANCER: Primary | ICD-10-CM

## 2025-08-05 PROBLEM — J30.2 SEASONAL ALLERGIC RHINITIS: Status: ACTIVE | Noted: 2025-08-05

## 2025-08-05 SDOH — ECONOMIC STABILITY - INCOME SECURITY: PROBLEM RELATED TO HOUSING AND ECONOMIC CIRCUMSTANCES, UNSPECIFIED: Z59.9

## 2025-08-08 ENCOUNTER — TRANSCRIBE ORDERS (OUTPATIENT)
Dept: SLEEP CENTER | Facility: CLINIC | Age: 55
End: 2025-08-08

## 2025-08-08 DIAGNOSIS — R29.818 SUSPECTED SLEEP APNEA: Primary | ICD-10-CM

## 2025-08-11 ENCOUNTER — PATIENT OUTREACH (OUTPATIENT)
Dept: CASE MANAGEMENT | Facility: OTHER | Age: 55
End: 2025-08-11

## 2025-08-14 ENCOUNTER — PATIENT OUTREACH (OUTPATIENT)
Dept: CASE MANAGEMENT | Facility: OTHER | Age: 55
End: 2025-08-14

## 2025-08-19 DIAGNOSIS — Z13.6 SCREENING FOR CARDIOVASCULAR CONDITION: ICD-10-CM

## 2025-08-19 DIAGNOSIS — Z13.29 SCREENING FOR THYROID DISORDER: ICD-10-CM

## 2025-08-20 ENCOUNTER — TELEPHONE (OUTPATIENT)
Age: 55
End: 2025-08-20

## 2025-08-21 ENCOUNTER — TELEPHONE (OUTPATIENT)
Age: 55
End: 2025-08-21

## 2025-08-26 PROBLEM — G47.33 OSA (OBSTRUCTIVE SLEEP APNEA): Status: ACTIVE | Noted: 2025-08-26

## (undated) DEVICE — GLIDESHEATH BASIC HYDROPHILIC COATED INTRODUCER SHEATH: Brand: GLIDESHEATH

## (undated) DEVICE — DGW .035 FC J3MM 260CM TEF: Brand: EMERALD

## (undated) DEVICE — TR BAND RADIAL ARTERY COMPRESSION DEVICE: Brand: TR BAND

## (undated) DEVICE — RADIFOCUS OPTITORQUE ANGIOGRAPHIC CATHETER: Brand: OPTITORQUE

## (undated) DEVICE — GUIDEWIRE WHOLEY HI TORQUE INTERM MOD J .035 145CM